# Patient Record
Sex: MALE | Race: WHITE | NOT HISPANIC OR LATINO | Employment: OTHER | URBAN - METROPOLITAN AREA
[De-identification: names, ages, dates, MRNs, and addresses within clinical notes are randomized per-mention and may not be internally consistent; named-entity substitution may affect disease eponyms.]

---

## 2023-04-27 ENCOUNTER — HOSPITAL ENCOUNTER (EMERGENCY)
Facility: HOSPITAL | Age: 58
Discharge: HOME | End: 2023-04-28
Attending: EMERGENCY MEDICINE
Payer: MEDICARE

## 2023-04-27 DIAGNOSIS — E16.0 HYPOGLYCEMIA DUE TO INSULIN: Primary | ICD-10-CM

## 2023-04-27 DIAGNOSIS — T38.3X5A HYPOGLYCEMIA DUE TO INSULIN: Primary | ICD-10-CM

## 2023-04-27 PROCEDURE — 99284 EMERGENCY DEPT VISIT MOD MDM: CPT

## 2023-04-28 VITALS
WEIGHT: 250.22 LBS | HEIGHT: 76 IN | DIASTOLIC BLOOD PRESSURE: 85 MMHG | BODY MASS INDEX: 30.47 KG/M2 | HEART RATE: 78 BPM | OXYGEN SATURATION: 95 % | SYSTOLIC BLOOD PRESSURE: 125 MMHG | TEMPERATURE: 97.9 F | RESPIRATION RATE: 20 BRPM

## 2023-04-28 VITALS
HEIGHT: 76 IN | SYSTOLIC BLOOD PRESSURE: 125 MMHG | DIASTOLIC BLOOD PRESSURE: 85 MMHG | BODY MASS INDEX: 29.47 KG/M2 | RESPIRATION RATE: 19 BRPM | TEMPERATURE: 97.7 F | WEIGHT: 242 LBS | HEART RATE: 74 BPM | OXYGEN SATURATION: 99 %

## 2023-04-28 DIAGNOSIS — E11.649 HYPOGLYCEMIA ASSOCIATED WITH DIABETES: Primary | ICD-10-CM

## 2023-04-28 LAB
ALBUMIN SERPL-MCNC: 4.4 G/DL (ref 3.5–5.2)
ALBUMIN/GLOB SERPL: 1.3 G/DL
ALP SERPL-CCNC: 89 U/L (ref 39–117)
ALT SERPL W P-5'-P-CCNC: 26 U/L (ref 1–41)
ANION GAP SERPL CALCULATED.3IONS-SCNC: 10.3 MMOL/L (ref 5–15)
AST SERPL-CCNC: 17 U/L (ref 1–40)
BASOPHILS # BLD AUTO: 0.05 10*3/MM3 (ref 0–0.2)
BASOPHILS NFR BLD AUTO: 0.5 % (ref 0–1.5)
BILIRUB SERPL-MCNC: 0.4 MG/DL (ref 0–1.2)
BUN SERPL-MCNC: 21 MG/DL (ref 6–20)
BUN/CREAT SERPL: 25.6 (ref 7–25)
CALCIUM SPEC-SCNC: 9 MG/DL (ref 8.6–10.5)
CHLORIDE SERPL-SCNC: 100 MMOL/L (ref 98–107)
CO2 SERPL-SCNC: 27.7 MMOL/L (ref 22–29)
CREAT SERPL-MCNC: 0.82 MG/DL (ref 0.76–1.27)
DEPRECATED RDW RBC AUTO: 41.1 FL (ref 37–54)
EGFRCR SERPLBLD CKD-EPI 2021: 102.5 ML/MIN/1.73
EOSINOPHIL # BLD AUTO: 0.14 10*3/MM3 (ref 0–0.4)
EOSINOPHIL NFR BLD AUTO: 1.4 % (ref 0.3–6.2)
ERYTHROCYTE [DISTWIDTH] IN BLOOD BY AUTOMATED COUNT: 13.2 % (ref 12.3–15.4)
GLOBULIN UR ELPH-MCNC: 3.5 GM/DL
GLUCOSE BLDC GLUCOMTR-MCNC: 108 MG/DL (ref 70–99)
GLUCOSE BLDC GLUCOMTR-MCNC: 132 MG/DL (ref 70–99)
GLUCOSE BLDC GLUCOMTR-MCNC: 148 MG/DL (ref 70–99)
GLUCOSE BLDC GLUCOMTR-MCNC: 58 MG/DL (ref 70–99)
GLUCOSE SERPL-MCNC: 108 MG/DL (ref 65–99)
HCT VFR BLD AUTO: 42.2 % (ref 37.5–51)
HGB BLD-MCNC: 13.9 G/DL (ref 13–17.7)
IMM GRANULOCYTES # BLD AUTO: 0.03 10*3/MM3 (ref 0–0.05)
IMM GRANULOCYTES NFR BLD AUTO: 0.3 % (ref 0–0.5)
LYMPHOCYTES # BLD AUTO: 1.81 10*3/MM3 (ref 0.7–3.1)
LYMPHOCYTES NFR BLD AUTO: 18.6 % (ref 19.6–45.3)
MCH RBC QN AUTO: 28.3 PG (ref 26.6–33)
MCHC RBC AUTO-ENTMCNC: 32.9 G/DL (ref 31.5–35.7)
MCV RBC AUTO: 85.8 FL (ref 79–97)
MONOCYTES # BLD AUTO: 0.59 10*3/MM3 (ref 0.1–0.9)
MONOCYTES NFR BLD AUTO: 6.1 % (ref 5–12)
NEUTROPHILS NFR BLD AUTO: 7.1 10*3/MM3 (ref 1.7–7)
NEUTROPHILS NFR BLD AUTO: 73.1 % (ref 42.7–76)
NRBC BLD AUTO-RTO: 0 /100 WBC (ref 0–0.2)
PLATELET # BLD AUTO: 212 10*3/MM3 (ref 140–450)
PMV BLD AUTO: 10.5 FL (ref 6–12)
POTASSIUM SERPL-SCNC: 4 MMOL/L (ref 3.5–5.2)
PROT SERPL-MCNC: 7.9 G/DL (ref 6–8.5)
RBC # BLD AUTO: 4.92 10*6/MM3 (ref 4.14–5.8)
SODIUM SERPL-SCNC: 138 MMOL/L (ref 136–145)
WBC NRBC COR # BLD: 9.72 10*3/MM3 (ref 3.4–10.8)

## 2023-04-28 PROCEDURE — 82948 REAGENT STRIP/BLOOD GLUCOSE: CPT

## 2023-04-28 PROCEDURE — 36415 COLL VENOUS BLD VENIPUNCTURE: CPT

## 2023-04-28 PROCEDURE — 80053 COMPREHEN METABOLIC PANEL: CPT | Performed by: EMERGENCY MEDICINE

## 2023-04-28 PROCEDURE — 96374 THER/PROPH/DIAG INJ IV PUSH: CPT

## 2023-04-28 PROCEDURE — 99284 EMERGENCY DEPT VISIT MOD MDM: CPT

## 2023-04-28 PROCEDURE — 85025 COMPLETE CBC W/AUTO DIFF WBC: CPT | Performed by: EMERGENCY MEDICINE

## 2023-04-28 RX ORDER — INSULIN HUMAN 500 [IU]/ML
INJECTION, SOLUTION SUBCUTANEOUS
COMMUNITY
Start: 2023-01-13

## 2023-04-28 RX ORDER — CETIRIZINE HYDROCHLORIDE 10 MG/1
TABLET ORAL
COMMUNITY

## 2023-04-28 RX ORDER — LEVOTHYROXINE SODIUM 0.03 MG/1
25 TABLET ORAL DAILY
COMMUNITY

## 2023-04-28 RX ORDER — DAPAGLIFLOZIN AND METFORMIN HYDROCHLORIDE 5; 1000 MG/1; MG/1
TABLET, FILM COATED, EXTENDED RELEASE ORAL EVERY 24 HOURS
COMMUNITY

## 2023-04-28 RX ORDER — CARVEDILOL 25 MG/1
TABLET ORAL
COMMUNITY
Start: 2023-04-04

## 2023-04-28 RX ORDER — DAPAGLIFLOZIN AND METFORMIN HYDROCHLORIDE 5; 1000 MG/1; MG/1
TABLET, FILM COATED, EXTENDED RELEASE ORAL
COMMUNITY
Start: 2023-04-04

## 2023-04-28 RX ORDER — DEXTROSE MONOHYDRATE 25 G/50ML
25 INJECTION, SOLUTION INTRAVENOUS ONCE
Status: COMPLETED | OUTPATIENT
Start: 2023-04-28 | End: 2023-04-28

## 2023-04-28 RX ORDER — INSULIN HUMAN 500 [IU]/ML
INJECTION, SOLUTION SUBCUTANEOUS
COMMUNITY

## 2023-04-28 RX ORDER — CHLORAL HYDRATE 500 MG
CAPSULE ORAL EVERY 12 HOURS SCHEDULED
COMMUNITY

## 2023-04-28 RX ORDER — ASPIRIN 81 MG/1
TABLET ORAL EVERY 24 HOURS
COMMUNITY

## 2023-04-28 RX ORDER — MELATONIN: COMMUNITY

## 2023-04-28 RX ADMIN — DEXTROSE MONOHYDRATE 25 G: 25 INJECTION, SOLUTION INTRAVENOUS at 01:37

## 2023-04-28 NOTE — ED PROVIDER NOTES
Time: 7:01 PM EDT  Date of encounter:  4/28/2023  Independent Historian/Clinical History and Information was obtained by:   Patient, Family and EMS  Chief Complaint: Hypoglycemia    History is limited by: N/A    History of Present Illness:  Patient is a 58 y.o. year old male who presents to the emergency department for evaluation of hypoglycemia. Pt is on insulin. EMS reports that family called due to Pt passing out. Pt states he is mostly checking his blood sugar when he is taking his insulin. EMS states they gave patient oral glucose. Pt states he hasn't eaten since this morning. Family states Pt was found yesterday in his vehicle not responding.     HPI    Patient Care Team  Primary Care Provider: Provider, No Known    Past Medical History:     Allergies   Allergen Reactions   • Penicillins Itching     History reviewed. No pertinent past medical history.  History reviewed. No pertinent surgical history.  History reviewed. No pertinent family history.    Home Medications:  Prior to Admission medications    Medication Sig Start Date End Date Taking? Authorizing Provider   aspirin 81 MG EC tablet Daily.    Keli Hernandez MD   carvedilol (COREG) 25 MG tablet  4/4/23   Keli Hernandez MD   cetirizine (zyrTEC) 10 MG tablet cetirizine 10 mg tablet    Keli Hernandez MD   Cholecalciferol 25 MCG (1000 UT) tablet cholecalciferol (vitamin D3) 25 mcg (1,000 unit) tablet    Keli Hernandez MD   dapagliflozin-metformin HCl ER (Xigduo XR) 5-1000 MG tablet Daily.    Keli Hernandez MD   HUMULIN R 500 UNIT/ML CONCENTRATED injection INJECT 380 UNITS A DAY VIA PUMP  UNITS THREE TIMES DAILY 1/13/23   Klei Hernandez MD   insulin regular (HUMULIN R) 500 UNIT/ML CONCENTRATED injection Humulin R U-500 (Concentrated) Insulin 500 unit/mL subcutaneous soln   INJECT 380 UNITS A DAY VIA PUMP  UNITS THREE TIMES DAILY    Keli Hernandez MD   levothyroxine (SYNTHROID, LEVOTHROID) 25 MCG  "tablet Take 1 tablet by mouth Daily.    Provider, MD Keli   Omega-3 Fatty Acids (fish oil) 1000 MG capsule capsule Every 12 (Twelve) Hours.    ProviderKeli MD   Xigduo XR 5-1000 MG tablet  4/4/23   ProviderKeli MD        Social History:   Social History     Tobacco Use   • Smoking status: Every Day     Packs/day: 1.00     Years: 15.00     Pack years: 15.00     Types: Cigarettes   • Smokeless tobacco: Never   Vaping Use   • Vaping Use: Never used   Substance Use Topics   • Alcohol use: Not Currently   • Drug use: Not Currently         Review of Systems:  Review of Systems   Constitutional: Negative for chills and fever.   HENT: Negative for congestion, rhinorrhea and sore throat.    Eyes: Negative for pain and visual disturbance.   Respiratory: Negative for apnea, cough, chest tightness and shortness of breath.    Cardiovascular: Negative for chest pain and palpitations.   Gastrointestinal: Negative for abdominal pain, diarrhea, nausea and vomiting.   Genitourinary: Negative for difficulty urinating and dysuria.   Musculoskeletal: Negative for joint swelling and myalgias.   Skin: Negative for color change.   Neurological: Positive for syncope. Negative for seizures and headaches.   Psychiatric/Behavioral: Negative.    All other systems reviewed and are negative.       Physical Exam:  /87   Pulse 84   Temp 97.9 °F (36.6 °C) (Oral)   Resp 20   Ht 193 cm (75.98\")   Wt 113 kg (250 lb 3.6 oz)   SpO2 94%   BMI 30.47 kg/m²     Physical Exam  Constitutional:       Appearance: Normal appearance.   HENT:      Head: Normocephalic and atraumatic.      Nose: Nose normal.      Mouth/Throat:      Mouth: Mucous membranes are moist.   Eyes:      Extraocular Movements: Extraocular movements intact.      Conjunctiva/sclera: Conjunctivae normal.      Pupils: Pupils are equal, round, and reactive to light.   Cardiovascular:      Rate and Rhythm: Normal rate and regular rhythm.      Pulses: Normal " pulses.      Heart sounds: Normal heart sounds.   Pulmonary:      Effort: Pulmonary effort is normal.      Breath sounds: Normal breath sounds. No wheezing.   Abdominal:      Palpations: Abdomen is soft.      Tenderness: There is no abdominal tenderness.   Musculoskeletal:         General: Normal range of motion.      Cervical back: Normal range of motion and neck supple.      Right lower leg: No edema.      Left lower leg: No edema.   Skin:     General: Skin is warm and dry.      Capillary Refill: Capillary refill takes less than 2 seconds.      Findings: No rash.   Neurological:      General: No focal deficit present.      Mental Status: He is alert and oriented to person, place, and time. Mental status is at baseline.      Cranial Nerves: No cranial nerve deficit.      Sensory: No sensory deficit.      Motor: No weakness.   Psychiatric:         Mood and Affect: Mood normal.         Behavior: Behavior normal.                  Procedures:  Procedures      Medical Decision Making:      Comorbidities that affect care:    Diabetes    External Notes reviewed:    Previous ED Note: Evaluation for hyperglycemia      The following orders were placed and all results were independently analyzed by me:  Orders Placed This Encounter   Procedures   • POC Glucose STAT   • POC Glucose Once       Medications Given in the Emergency Department:  Medications - No data to display     ED Course:         Labs:    Lab Results (last 24 hours)     Procedure Component Value Units Date/Time    CBC & Differential [[id number]]  (Abnormal) Collected: 04/28/23 0009    Specimen: Blood Updated: 04/28/23 0013    Narrative:      The following orders were created for panel order CBC & Differential.  Procedure                               Abnormality         Status                     ---------                               -----------         ------                     CBC Auto Differential[[id number]]        Abnormal            Final result                  Please view results for these tests on the individual orders.    Comprehensive Metabolic Panel [[id number]]  (Abnormal) Collected: 04/28/23 0009    Specimen: Blood Updated: 04/28/23 0028     Glucose 108 mg/dL      BUN 21 mg/dL      Creatinine 0.82 mg/dL      Sodium 138 mmol/L      Potassium 4.0 mmol/L      Chloride 100 mmol/L      CO2 27.7 mmol/L      Calcium 9.0 mg/dL      Total Protein 7.9 g/dL      Albumin 4.4 g/dL      ALT (SGPT) 26 U/L      AST (SGOT) 17 U/L      Alkaline Phosphatase 89 U/L      Total Bilirubin 0.4 mg/dL      Globulin 3.5 gm/dL      A/G Ratio 1.3 g/dL      BUN/Creatinine Ratio 25.6     Anion Gap 10.3 mmol/L      eGFR 102.5 mL/min/1.73     Narrative:      GFR Normal >60  Chronic Kidney Disease <60  Kidney Failure <15      CBC Auto Differential [[id number]]  (Abnormal) Collected: 04/28/23 0009    Specimen: Blood Updated: 04/28/23 0013     WBC 9.72 10*3/mm3      RBC 4.92 10*6/mm3      Hemoglobin 13.9 g/dL      Hematocrit 42.2 %      MCV 85.8 fL      MCH 28.3 pg      MCHC 32.9 g/dL      RDW 13.2 %      RDW-SD 41.1 fl      MPV 10.5 fL      Platelets 212 10*3/mm3      Neutrophil % 73.1 %      Lymphocyte % 18.6 %      Monocyte % 6.1 %      Eosinophil % 1.4 %      Basophil % 0.5 %      Immature Grans % 0.3 %      Neutrophils, Absolute 7.10 10*3/mm3      Lymphocytes, Absolute 1.81 10*3/mm3      Monocytes, Absolute 0.59 10*3/mm3      Eosinophils, Absolute 0.14 10*3/mm3      Basophils, Absolute 0.05 10*3/mm3      Immature Grans, Absolute 0.03 10*3/mm3      nRBC 0.0 /100 WBC     POC Glucose Once [[id number]]  (Abnormal) Collected: 04/28/23 0010    Specimen: Blood Updated: 04/28/23 0012     Glucose 108 mg/dL      Comment: Serial Number: [id number]:  [id number]       POC Glucose Once [[id number]]  (Abnormal) Collected: 04/28/23 0127    Specimen: Blood Updated: 04/28/23 0129     Glucose 58 mg/dL      Comment: Serial Number: [id number]:  [id number]       POC Glucose Once [[id number]]  (Abnormal)  Collected: 04/28/23 0221    Specimen: Blood Updated: 04/28/23 0223     Glucose 148 mg/dL      Comment: Serial Number: [id number]:  [id number]       POC Glucose Once [[id number]]  (Abnormal) Collected: 04/28/23 1931    Specimen: Blood Updated: 04/28/23 1934     Glucose 132 mg/dL      Comment: Serial Number: [id number]:  [id number]              Imaging:    No Radiology Exams Resulted Within Past 24 Hours      Differential Diagnosis and Discussion:    Metabolic: Differential diagnosis includes but is not limited to hypertension, hyperglycemia, hyperkalemia, hypocalcemia, metabolic acidosis, hypokalemia, hypoglycemia, malnutrition, hypothyroidism, hyperthyroidism, and adrenal insufficiency.     All labs were reviewed and interpreted by me.    MDM  Number of Diagnoses or Management Options  Hypoglycemia associated with diabetes  Diagnosis management comments: In summary this is a 58-year-old male patient, diabetic who presents to the emergency department for evaluation of another hypoglycemic episode.  On further investigation patient is not eating and adhering to a diabetic diet.  Last thing he had was waffles and hashbrowns from [employer] followed by large insulin bolus.  At this time he is otherwise awake and well-appearing and at his baseline.  Is been given strict instructions for diabetic diet and his blood sugar has maintained in the emergency department after eating food.  Very strict return to ER and follow-up instructions have been provided to the patient.             Patient Care Considerations:    LABS: I considered ordering labs, however Patient recently had laboratory evaluation      Consultants/Shared Management Plan:    None    Social Determinants of Health:    Patient is independent, reliable, and has access to care.       Disposition and Care Coordination:    Discharged: I considered escalation of care by admitting this patient for observation, however the patient has improved and is  suitable and  stable for discharge.    I have explained the patient´s condition, diagnoses and treatment plan based on the information available to me at this time. I have answered questions and addressed any concerns. The patient has a good  understanding of the patient´s diagnosis, condition, and treatment plan as can be expected at this point. The vital signs have been stable. The patient´s condition is stable and appropriate for discharge from the emergency department.      The patient will pursue further outpatient evaluation with the primary care physician or other designated or consulting physician as outlined in the discharge instructions. They are agreeable to this plan of care and follow-up instructions have been explained in detail. The patient has received these instructions in written format and have expressed an understanding of the discharge instructions. The patient is aware that any significant change in condition or worsening of symptoms should prompt an immediate return to this or the closest emergency department or call to 911.  I have explained discharge medications and the need for follow up with the patient/caretakers. This was also printed in the discharge instructions. Patient was discharged with the following medications and follow up:      Medication List      No changes were made to your prescriptions during this visit.      Provider, No Known  [facility] [id number]    In 2 days         Final diagnoses:   Hypoglycemia associated with diabetes        ED Disposition     ED Disposition   Discharge    Condition   Stable    Comment   --             This medical record created using voice recognition software.    Documentation assistance provided by Mack Cruz, acting as scribe for Jeramy Nielson MD. Information recorded by the scribe was done at my direction and has been verified and validated by me.     Mack Cruz  04/28/23 1918       Jeramy Nielson MD  04/28/23  1946

## 2023-04-28 NOTE — ED PROVIDER NOTES
"Time: 12:57 AM EDT  Date of encounter:  4/27/2023  Independent Historian/Clinical History and Information was obtained by:   Patient  Chief Complaint: Hypoglycemia    History is limited by: N/A    History of Present Illness:      Patient is a 58 y.o. year old male who presents to the emergency department for evaluation of hypoglycemia. Pt states that he went to the doctor and was given more insulin despite not eating and reports being found in his car unconscious. Pt also notes that he does not remember even being at the store and admits to hypoglycemia in the past. Pt notes that he does take insulin at home for diabetes. Pt reports that he does monitor his blood glucose regularly at home.       History provided by:  Patient   used: No        Patient Care Team  Primary Care Provider: Provider, No Known    Past Medical History:     Allergies   Allergen Reactions   • Penicillins Itching     No past medical history on file.  No past surgical history on file.  No family history on file.    Home Medications:  Prior to Admission medications    Not on File        Social History:          Review of Systems:  Review of Systems   Constitutional: Negative for chills and fever.   HENT: Negative for congestion, rhinorrhea and sore throat.    Eyes: Negative for pain and visual disturbance.   Respiratory: Negative for apnea, cough, chest tightness and shortness of breath.    Cardiovascular: Negative for chest pain and palpitations.   Gastrointestinal: Negative for abdominal pain, diarrhea, nausea and vomiting.   Genitourinary: Negative for difficulty urinating and dysuria.   Musculoskeletal: Negative for joint swelling and myalgias.   Skin: Negative for color change.   Neurological: Negative for seizures and headaches.   Psychiatric/Behavioral: Negative.    All other systems reviewed and are negative.       Physical Exam:  /85   Pulse 74   Temp 97.7 °F (36.5 °C) (Oral)   Resp 19   Ht 193 cm (76\")   " Wt 110 kg (242 lb)   SpO2 99%   BMI 29.46 kg/m²     Physical Exam  Vitals and nursing note reviewed.   Constitutional:       General: He is not in acute distress.     Appearance: Normal appearance. He is not toxic-appearing.   HENT:      Head: Normocephalic and atraumatic.      Jaw: There is normal jaw occlusion.   Eyes:      General: Lids are normal.      Extraocular Movements: Extraocular movements intact.      Conjunctiva/sclera: Conjunctivae normal.      Pupils: Pupils are equal, round, and reactive to light.   Cardiovascular:      Rate and Rhythm: Normal rate and regular rhythm.      Pulses: Normal pulses.      Heart sounds: Normal heart sounds.   Pulmonary:      Effort: Pulmonary effort is normal. No respiratory distress.      Breath sounds: Normal breath sounds. No wheezing or rhonchi.   Abdominal:      General: Abdomen is flat.      Palpations: Abdomen is soft.      Tenderness: There is no abdominal tenderness. There is no guarding or rebound.   Musculoskeletal:         General: Normal range of motion.      Cervical back: Normal range of motion and neck supple.      Right lower leg: No edema.      Left lower leg: No edema.   Skin:     General: Skin is warm and dry.   Neurological:      Mental Status: He is alert and oriented to person, place, and time. Mental status is at baseline.   Psychiatric:         Mood and Affect: Mood normal.                  Procedures:  Procedures      Medical Decision Making:      Comorbidities that affect care:    Diabetes    External Notes reviewed:    None      The following orders were placed and all results were independently analyzed by me:  Orders Placed This Encounter   Procedures   • Comprehensive Metabolic Panel   • CBC Auto Differential   • Po fluids  Misc Nursing Order (Specify)   • POC Glucose Once   • POC Glucose STAT   • POC Glucose Once   • POC Glucose STAT   • POC Glucose Once   • CBC & Differential       Medications Given in the Emergency  Department:  Medications   dextrose (D50W) (25 g/50 mL) IV injection 25 g (25 g Intravenous Given 4/28/23 0137)        ED Course:         Labs:    Lab Results (last 24 hours)     Procedure Component Value Units Date/Time    CBC & Differential [[id number]]  (Abnormal) Collected: 04/28/23 0009    Specimen: Blood Updated: 04/28/23 0013    Narrative:      The following orders were created for panel order CBC & Differential.  Procedure                               Abnormality         Status                     ---------                               -----------         ------                     CBC Auto Differential[[id number]]        Abnormal            Final result                 Please view results for these tests on the individual orders.    Comprehensive Metabolic Panel [[id number]]  (Abnormal) Collected: 04/28/23 0009    Specimen: Blood Updated: 04/28/23 0028     Glucose 108 mg/dL      BUN 21 mg/dL      Creatinine 0.82 mg/dL      Sodium 138 mmol/L      Potassium 4.0 mmol/L      Chloride 100 mmol/L      CO2 27.7 mmol/L      Calcium 9.0 mg/dL      Total Protein 7.9 g/dL      Albumin 4.4 g/dL      ALT (SGPT) 26 U/L      AST (SGOT) 17 U/L      Alkaline Phosphatase 89 U/L      Total Bilirubin 0.4 mg/dL      Globulin 3.5 gm/dL      A/G Ratio 1.3 g/dL      BUN/Creatinine Ratio 25.6     Anion Gap 10.3 mmol/L      eGFR 102.5 mL/min/1.73     Narrative:      GFR Normal >60  Chronic Kidney Disease <60  Kidney Failure <15      CBC Auto Differential [[id number]]  (Abnormal) Collected: 04/28/23 0009    Specimen: Blood Updated: 04/28/23 0013     WBC 9.72 10*3/mm3      RBC 4.92 10*6/mm3      Hemoglobin 13.9 g/dL      Hematocrit 42.2 %      MCV 85.8 fL      MCH 28.3 pg      MCHC 32.9 g/dL      RDW 13.2 %      RDW-SD 41.1 fl      MPV 10.5 fL      Platelets 212 10*3/mm3      Neutrophil % 73.1 %      Lymphocyte % 18.6 %      Monocyte % 6.1 %      Eosinophil % 1.4 %      Basophil % 0.5 %      Immature Grans % 0.3 %      Neutrophils,  Absolute 7.10 10*3/mm3      Lymphocytes, Absolute 1.81 10*3/mm3      Monocytes, Absolute 0.59 10*3/mm3      Eosinophils, Absolute 0.14 10*3/mm3      Basophils, Absolute 0.05 10*3/mm3      Immature Grans, Absolute 0.03 10*3/mm3      nRBC 0.0 /100 WBC     POC Glucose Once [[id number]]  (Abnormal) Collected: 04/28/23 0010    Specimen: Blood Updated: 04/28/23 0012     Glucose 108 mg/dL      Comment: Serial Number: [id number]:  [id number]       POC Glucose Once [[id number]]  (Abnormal) Collected: 04/28/23 0127    Specimen: Blood Updated: 04/28/23 0129     Glucose 58 mg/dL      Comment: Serial Number: [id number]:  [id number]       POC Glucose Once [[id number]]  (Abnormal) Collected: 04/28/23 0221    Specimen: Blood Updated: 04/28/23 0223     Glucose 148 mg/dL      Comment: Serial Number: [id number]:  [id number]              Imaging:    No Radiology Exams Resulted Within Past 24 Hours      Differential Diagnosis and Discussion:    Hypoglycemia    All labs were reviewed and interpreted by me.    MDM         Patient Care Considerations:    ANTIBIOTICS: I considered prescribing antibiotics as an outpatient however No bacterial etiology found in the emergency department.      Consultants/Shared Management Plan:    None    Social Determinants of Health:    Patient has presented with family members who are responsible, reliable and will ensure follow up care.      Disposition and Care Coordination:    Discharged: The patient is suitable and stable for discharge with no need for consideration of observation or admission.    I have explained the patient´s condition, diagnoses and treatment plan based on the information available to me at this time. I have answered questions and addressed any concerns. The patient has a good  understanding of the patient´s diagnosis, condition, and treatment plan as can be expected at this point. The vital signs have been stable. The patient´s condition is stable and appropriate for  discharge from the emergency department.      The patient will pursue further outpatient evaluation with the primary care physician or other designated or consulting physician as outlined in the discharge instructions. They are agreeable to this plan of care and follow-up instructions have been explained in detail. The patient has received these instructions in written format and have expressed an understanding of the discharge instructions. The patient is aware that any significant change in condition or worsening of symptoms should prompt an immediate return to this or the closest emergency department or call to 911.  I have explained discharge medications and the need for follow up with the patient/caretakers. This was also printed in the discharge instructions. Patient was discharged with the following medications and follow up:      Medication List      No changes were made to your prescriptions during this visit.      [facility], No Known  [facility] [place] [address]    Schedule an appointment as soon as possible for a visit          Final diagnoses:   Hypoglycemia due to insulin        ED Disposition     ED Disposition   Discharge    Condition   Stable    Comment   --             This medical record created using voice recognition software.        Documentation assistance provided by Vladislav Martins acting as scribe for Fantasma Yip MD. Information recorded by the scribe was done at my direction and has been verified and validated by me.        Vladislav Martins  04/28/23 0120       Fantasma Yip MD  04/28/23 [id number]

## 2023-05-02 LAB — GLUCOSE BLDC GLUCOMTR-MCNC: 83 MG/DL (ref 70–99)

## 2023-05-06 ENCOUNTER — HOSPITAL ENCOUNTER (EMERGENCY)
Facility: HOSPITAL | Age: 58
Discharge: HOME | End: 2023-05-06
Attending: EMERGENCY MEDICINE
Payer: MEDICARE

## 2023-05-06 VITALS
BODY MASS INDEX: 28.7 KG/M2 | WEIGHT: 235.67 LBS | HEART RATE: 70 BPM | OXYGEN SATURATION: 99 % | DIASTOLIC BLOOD PRESSURE: 88 MMHG | SYSTOLIC BLOOD PRESSURE: 111 MMHG | RESPIRATION RATE: 18 BRPM | HEIGHT: 76 IN | TEMPERATURE: 97.5 F

## 2023-05-06 DIAGNOSIS — E11.649 HYPOGLYCEMIA ASSOCIATED WITH DIABETES: Primary | ICD-10-CM

## 2023-05-06 LAB
ALBUMIN SERPL-MCNC: 4.4 G/DL (ref 3.5–5.2)
ALBUMIN/GLOB SERPL: 1.2 G/DL
ALP SERPL-CCNC: 74 U/L (ref 39–117)
ALT SERPL W P-5'-P-CCNC: 15 U/L (ref 1–41)
ANION GAP SERPL CALCULATED.3IONS-SCNC: 12.8 MMOL/L (ref 5–15)
AST SERPL-CCNC: 15 U/L (ref 1–40)
BASOPHILS # BLD AUTO: 0.03 10*3/MM3 (ref 0–0.2)
BASOPHILS NFR BLD AUTO: 0.3 % (ref 0–1.5)
BILIRUB SERPL-MCNC: 0.5 MG/DL (ref 0–1.2)
BUN SERPL-MCNC: 21 MG/DL (ref 6–20)
BUN/CREAT SERPL: 28 (ref 7–25)
CALCIUM SPEC-SCNC: 9.3 MG/DL (ref 8.6–10.5)
CHLORIDE SERPL-SCNC: 99 MMOL/L (ref 98–107)
CO2 SERPL-SCNC: 27.2 MMOL/L (ref 22–29)
CREAT SERPL-MCNC: 0.75 MG/DL (ref 0.76–1.27)
DEPRECATED RDW RBC AUTO: 38.4 FL (ref 37–54)
EGFRCR SERPLBLD CKD-EPI 2021: 105.3 ML/MIN/1.73
EOSINOPHIL # BLD AUTO: 0.02 10*3/MM3 (ref 0–0.4)
EOSINOPHIL NFR BLD AUTO: 0.2 % (ref 0.3–6.2)
ERYTHROCYTE [DISTWIDTH] IN BLOOD BY AUTOMATED COUNT: 12.8 % (ref 12.3–15.4)
GLOBULIN UR ELPH-MCNC: 3.8 GM/DL
GLUCOSE BLDC GLUCOMTR-MCNC: 139 MG/DL (ref 70–99)
GLUCOSE BLDC GLUCOMTR-MCNC: 150 MG/DL (ref 70–99)
GLUCOSE BLDC GLUCOMTR-MCNC: 54 MG/DL (ref 70–99)
GLUCOSE BLDC GLUCOMTR-MCNC: 97 MG/DL (ref 70–99)
GLUCOSE SERPL-MCNC: 67 MG/DL (ref 65–99)
HCT VFR BLD AUTO: 44.2 % (ref 37.5–51)
HGB BLD-MCNC: 15.1 G/DL (ref 13–17.7)
HOLD SPECIMEN: NORMAL
HOLD SPECIMEN: NORMAL
IMM GRANULOCYTES # BLD AUTO: 0.03 10*3/MM3 (ref 0–0.05)
IMM GRANULOCYTES NFR BLD AUTO: 0.3 % (ref 0–0.5)
LYMPHOCYTES # BLD AUTO: 1.17 10*3/MM3 (ref 0.7–3.1)
LYMPHOCYTES NFR BLD AUTO: 12.6 % (ref 19.6–45.3)
MCH RBC QN AUTO: 28.3 PG (ref 26.6–33)
MCHC RBC AUTO-ENTMCNC: 34.2 G/DL (ref 31.5–35.7)
MCV RBC AUTO: 82.8 FL (ref 79–97)
MONOCYTES # BLD AUTO: 0.27 10*3/MM3 (ref 0.1–0.9)
MONOCYTES NFR BLD AUTO: 2.9 % (ref 5–12)
NEUTROPHILS NFR BLD AUTO: 7.73 10*3/MM3 (ref 1.7–7)
NEUTROPHILS NFR BLD AUTO: 83.7 % (ref 42.7–76)
NRBC BLD AUTO-RTO: 0 /100 WBC (ref 0–0.2)
PLATELET # BLD AUTO: 222 10*3/MM3 (ref 140–450)
PMV BLD AUTO: 10.8 FL (ref 6–12)
POTASSIUM SERPL-SCNC: 3.8 MMOL/L (ref 3.5–5.2)
PROT SERPL-MCNC: 8.2 G/DL (ref 6–8.5)
RBC # BLD AUTO: 5.34 10*6/MM3 (ref 4.14–5.8)
SODIUM SERPL-SCNC: 139 MMOL/L (ref 136–145)
WBC NRBC COR # BLD: 9.25 10*3/MM3 (ref 3.4–10.8)
WHOLE BLOOD HOLD COAG: NORMAL
WHOLE BLOOD HOLD SPECIMEN: NORMAL

## 2023-05-06 PROCEDURE — 85025 COMPLETE CBC W/AUTO DIFF WBC: CPT | Performed by: EMERGENCY MEDICINE

## 2023-05-06 PROCEDURE — 82948 REAGENT STRIP/BLOOD GLUCOSE: CPT

## 2023-05-06 PROCEDURE — 99283 EMERGENCY DEPT VISIT LOW MDM: CPT

## 2023-05-06 PROCEDURE — 80053 COMPREHEN METABOLIC PANEL: CPT | Performed by: EMERGENCY MEDICINE

## 2023-05-06 PROCEDURE — 96374 THER/PROPH/DIAG INJ IV PUSH: CPT

## 2023-05-06 PROCEDURE — 36415 COLL VENOUS BLD VENIPUNCTURE: CPT | Performed by: EMERGENCY MEDICINE

## 2023-05-06 RX ORDER — DEXTROSE MONOHYDRATE 25 G/50ML
25 INJECTION, SOLUTION INTRAVENOUS ONCE
Status: COMPLETED | OUTPATIENT
Start: 2023-05-06 | End: 2023-05-06

## 2023-05-06 RX ORDER — SODIUM CHLORIDE 0.9 % (FLUSH) 0.9 %
10 SYRINGE (ML) INJECTION AS NEEDED
Status: DISCONTINUED | OUTPATIENT
Start: 2023-05-06 | End: 2023-05-06 | Stop reason: HOSPADM

## 2023-05-06 RX ORDER — DEXTROSE MONOHYDRATE 25 G/50ML
INJECTION, SOLUTION INTRAVENOUS
Status: COMPLETED
Start: 2023-05-06 | End: 2023-05-06

## 2023-05-06 RX ADMIN — DEXTROSE MONOHYDRATE 25 G: 25 INJECTION, SOLUTION INTRAVENOUS at 16:15

## 2023-05-06 NOTE — DISCHARGE INSTRUCTIONS
Patient's blood glucose 150 in the emergency department.  Electrolytes are within normal limits and patient is not dehydrated according to lab work.  Keep your appointment with diabetic doctor for Wednesday.  Encourage plenty of fluids.  Encourage strict compliance with diabetic diet and checking your blood sugar regularly.  Return to the emergency department if patient becomes altered, loses consciousness, becomes weak or lethargic.

## 2023-05-06 NOTE — ED PROVIDER NOTES
Time: 3:49 PM EDT  Date of encounter:  5/6/2023  Independent Historian/Clinical History and Information was obtained by:   Patient and Family  Chief Complaint   Patient presents with   • Hypoglycemia       History is limited by: N/A    History of Present Illness:  Patient is a 58 y.o. year old male who presents to the emergency department for evaluation of hypoglycemia.  Patient's family states that the patient seemed altered earlier and when his blood sugar was checked it was 39.  Patient was given D10 by EMS services according to nursing staff.  Patient states he feels back to his baseline at this time.  Note patient has been to the emergency department multiple times over the past few weeks for the same complaint.  Patient states he is continuing to take insulin but his appetite has been decreased.  Patient ate turkey sandwich and a bag of chips prior to my evaluation.  (Provider in triage, Mark Laguna PA-C)    Family at bedside reports that patient is not complying with his diabetic diet and checking his blood sugar regularly.  Family reports whenever he is at home and under their guidance he does not have any issues with his blood glucose however whenever he is over his girlfriend's place he seems to not adhere to his diabetic diet and checking his blood sugar.  At this time patient denies any nausea, vomiting, abdominal pain, dizziness, lightheadedness, weakness.  Family denies syncopal episode during today's episode but states that he had syncopal episodes the 2 previous times he was seen in emergency department for the same complaint.  Patient denies fever, chills, shortness of breath, chest pain, cough.  Patient denies dysuria, hematuria, urinary frequency.    HPI    Patient Care Team  Primary Care Provider: Provider, No Known    Past Medical History:     Allergies   Allergen Reactions   • Penicillins Itching     Past Medical History:   Diagnosis Date   • Diabetes mellitus      History reviewed. No  pertinent surgical history.  History reviewed. No pertinent family history.    Home Medications:  Prior to Admission medications    Medication Sig Start Date End Date Taking? Authorizing Provider   aspirin 81 MG EC tablet Daily.    Keli Hernandez MD   carvedilol (COREG) 25 MG tablet  4/4/23   Keli Hernandez MD   cetirizine (zyrTEC) 10 MG tablet cetirizine 10 mg tablet    Keli Hernandez MD   Cholecalciferol 25 MCG (1000 UT) tablet cholecalciferol (vitamin D3) 25 mcg (1,000 unit) tablet    Keli Hernandez MD   dapagliflozin-metformin HCl ER (Xigduo XR) 5-1000 MG tablet Daily.    Keli Hernandez MD   HUMULIN R 500 UNIT/ML CONCENTRATED injection INJECT 380 UNITS A DAY VIA PUMP  UNITS THREE TIMES DAILY 1/13/23   Keli Hernandez MD   insulin regular (HUMULIN R) 500 UNIT/ML CONCENTRATED injection Humulin R U-500 (Concentrated) Insulin 500 unit/mL subcutaneous soln   INJECT 380 UNITS A DAY VIA PUMP  UNITS THREE TIMES DAILY    Keli Hernandez MD   levothyroxine (SYNTHROID, LEVOTHROID) 25 MCG tablet Take 1 tablet by mouth Daily.    Keli Hernandez MD   Omega-3 Fatty Acids (fish oil) 1000 MG capsule capsule Every 12 (Twelve) Hours.    Keli Hernandez MD   Xigduo XR 5-1000 MG tablet  4/4/23   Keli Hernandez MD        Social History:   Social History     Tobacco Use   • Smoking status: Every Day     Packs/day: 1.00     Years: 15.00     Pack years: 15.00     Types: Cigarettes   • Smokeless tobacco: Never   Vaping Use   • Vaping Use: Never used   Substance Use Topics   • Alcohol use: Not Currently   • Drug use: Not Currently         Review of Systems:  Review of Systems   Constitutional: Negative for chills and fever.   Respiratory: Negative for cough and shortness of breath.    Cardiovascular: Negative for chest pain.   Gastrointestinal: Negative for abdominal pain, diarrhea, nausea and vomiting.   Genitourinary: Negative for dysuria, frequency and  "hematuria.   Musculoskeletal: Negative for gait problem.   Neurological: Negative for dizziness, syncope, weakness and light-headedness.   Psychiatric/Behavioral: Negative for confusion.        Physical Exam:  /88   Pulse 70   Temp 97.5 °F (36.4 °C) (Oral)   Resp 18   Ht 193 cm (76\")   Wt 107 kg (235 lb 10.8 oz)   SpO2 99%   BMI 28.69 kg/m²     Physical Exam  Vitals and nursing note reviewed.   Constitutional:       General: He is awake.      Appearance: Normal appearance. He is not ill-appearing or toxic-appearing.   HENT:      Head: Normocephalic and atraumatic.      Nose: Nose normal.      Mouth/Throat:      Mouth: Mucous membranes are moist.   Eyes:      Extraocular Movements: Extraocular movements intact.      Conjunctiva/sclera: Conjunctivae normal.      Pupils: Pupils are equal, round, and reactive to light.   Cardiovascular:      Rate and Rhythm: Normal rate and regular rhythm.      Pulses: Normal pulses.           Dorsalis pedis pulses are 2+ on the right side and 2+ on the left side.      Heart sounds: Normal heart sounds.   Pulmonary:      Effort: Pulmonary effort is normal. No respiratory distress.      Breath sounds: Normal breath sounds. No wheezing.   Chest:      Chest wall: No tenderness.   Abdominal:      General: Abdomen is flat. Bowel sounds are normal. There is no distension.      Palpations: Abdomen is soft.      Tenderness: There is no abdominal tenderness. There is no right CVA tenderness, left CVA tenderness or guarding.   Musculoskeletal:         General: Normal range of motion.      Cervical back: Normal range of motion and neck supple.   Skin:     General: Skin is warm and dry.      Coloration: Skin is not cyanotic.   Neurological:      General: No focal deficit present.      Mental Status: He is alert and oriented to person, place, and time.      Cranial Nerves: Cranial nerves 2-12 are intact. No cranial nerve deficit, dysarthria or facial asymmetry.      Motor: No weakness. "      Coordination: Finger-Nose-Finger Test normal.   Psychiatric:         Attention and Perception: Attention and perception normal.         Mood and Affect: Mood normal.         Speech: Speech normal.         Behavior: Behavior normal. Behavior is cooperative.         Thought Content: Thought content normal.                  Procedures:  Procedures      Medical Decision Making:      Comorbidities that affect care:    Diabetes    External Notes reviewed:    Previous ED Note: Patient was seen at [facility] emergency department on 4/28/2023 for hypoglycemia      The following orders were placed and all results were independently analyzed by me:  Orders Placed This Encounter   Procedures   • Shiner Draw   • Comprehensive Metabolic Panel   • CBC Auto Differential   • POC Glucose Once   • POC Glucose Once   • POC Glucose Once   • Insert peripheral IV   • CBC & Differential   • Green Top (Gel)   • Lavender Top   • Gold Top - SST   • Light Blue Top       Medications Given in the Emergency Department:  Medications   sodium chloride 0.9 % flush 10 mL (has no administration in time range)   dextrose (D50W) (25 g/50 mL) IV injection 25 g (25 g Intravenous Given 5/6/23 1615)        ED Course:    The patient was initially evaluated in the triage area where orders were placed. The patient was later dispositioned by Yusra Shabbir, PA.      The patient was advised to stay for completion of workup which includes but is not limited to communication of labs and radiological results, reassessment and plan. The patient was advised that leaving prior to disposition by a provider could result in critical findings that are not communicated to the patient.     ED Course as of 05/06/23 0257   Sat May 06, 2023   1552 PROVIDER IN TRIAGE  Patient was evaluated by me in triage, Mark Laguna PA-C.  Orders were placed and patient is currently awaiting final results and disposition.  [MD]   5353 Patient tolerated p.o. challenge.  Patient  oriented x3 at discharge. [YS]      ED Course User Index  [MD] Mark Laguna PA-C  [YS] Shabbir, Yusra, PA       Labs:    Lab Results (last 24 hours)     Procedure Component Value Units Date/Time    POC Glucose Once [[id number]]  (Normal) Collected: 05/06/23 1500    Specimen: Blood Updated: 05/06/23 1504     Glucose 97 mg/dL      Comment: Serial Number: [id number]:  [id number]       CBC & Differential [[id number]]  (Abnormal) Collected: 05/06/23 1541    Specimen: Blood Updated: 05/06/23 1549    Narrative:      The following orders were created for panel order CBC & Differential.  Procedure                               Abnormality         Status                     ---------                               -----------         ------                     CBC Auto Differential[[id number]]        Abnormal            Final result                 Please view results for these tests on the individual orders.    Comprehensive Metabolic Panel [[id number]]  (Abnormal) Collected: 05/06/23 1541    Specimen: Blood Updated: 05/06/23 1620     Glucose 67 mg/dL      BUN 21 mg/dL      Creatinine 0.75 mg/dL      Sodium 139 mmol/L      Potassium 3.8 mmol/L      Chloride 99 mmol/L      CO2 27.2 mmol/L      Calcium 9.3 mg/dL      Total Protein 8.2 g/dL      Albumin 4.4 g/dL      ALT (SGPT) 15 U/L      AST (SGOT) 15 U/L      Alkaline Phosphatase 74 U/L      Total Bilirubin 0.5 mg/dL      Globulin 3.8 gm/dL      A/G Ratio 1.2 g/dL      BUN/Creatinine Ratio 28.0     Anion Gap 12.8 mmol/L      eGFR 105.3 mL/min/1.73     Narrative:      GFR Normal >60  Chronic Kidney Disease <60  Kidney Failure <15      CBC Auto Differential [[id number]]  (Abnormal) Collected: 05/06/23 1541    Specimen: Blood Updated: 05/06/23 1549     WBC 9.25 10*3/mm3      RBC 5.34 10*6/mm3      Hemoglobin 15.1 g/dL      Hematocrit 44.2 %      MCV 82.8 fL      MCH 28.3 pg      MCHC 34.2 g/dL      RDW 12.8 %      RDW-SD 38.4 fl      MPV 10.8 fL      Platelets 222  10*3/mm3      Neutrophil % 83.7 %      Lymphocyte % 12.6 %      Monocyte % 2.9 %      Eosinophil % 0.2 %      Basophil % 0.3 %      Immature Grans % 0.3 %      Neutrophils, Absolute 7.73 10*3/mm3      Lymphocytes, Absolute 1.17 10*3/mm3      Monocytes, Absolute 0.27 10*3/mm3      Eosinophils, Absolute 0.02 10*3/mm3      Basophils, Absolute 0.03 10*3/mm3      Immature Grans, Absolute 0.03 10*3/mm3      nRBC 0.0 /100 WBC     POC Glucose Once [[id number]]  (Abnormal) Collected: 05/06/23 1606    Specimen: Blood Updated: 05/06/23 1610     Glucose 54 mg/dL      Comment: Serial Number: [id number]:  [id number]       POC Glucose Once [[id number]]  (Abnormal) Collected: 05/06/23 1643    Specimen: Blood Updated: 05/06/23 1645     Glucose 150 mg/dL      Comment: Serial Number: [id number]:  [id number]              Imaging:    No Radiology Exams Resulted Within Past 24 Hours      Differential Diagnosis and Discussion:      Altered Mental Status: Based on the patient's signs and symptoms, differential diagnosis includes but is not limited to meningitis, stroke, sepsis, subarachnoid hemorrhage, intracranial bleeding, encephalitis, and metabolic encephalopathy.    All labs were reviewed and interpreted by me.    MDM  Number of Diagnoses or Management Options  Diagnosis management comments: Patient arrived to the emergency department via EMS after having altered mental status due to low blood sugar.  Patient's family at bedside stated he has had multiple occurrences of low blood sugar in the past few weeks due to noncompliance with diabetic diet and checking blood sugar.  Patient's blood sugar at scene was 37.  POC blood glucose was 150 in the emergency department.  CBC unremarkable.  CMP unremarkable and negative for electrolyte deficiency or dehydration.  I considered doing a head CT due to patient's altered mental status prior to ED arrival, however, patient is alert oriented x3 after being given dextrose, intact  strength and sensation.  Neuro exam within normal limits.  Patient and family counseled about adherence to diabetic diet and checking blood sugar regularly.  Family states that they have an appointment with his diabetic doctor on Wednesday.  Family given strict return precautions regarding patient's mental status and blood glucose.            Patient Care Considerations:    CT HEAD: I considered ordering a noncontrast CT of the head, however Family at bedside states that patient was not acting like himself and then checked his blood sugar which was low.  Patient family states after patient received dextrose he immediately went back to being oriented x3.  Patient has had similar instances in the past with low blood sugar.      Consultants/Shared Management Plan:    I have discussed the case with Dr. Nielson who states that the patient can be safely discharged with close follow up.    Social Determinants of Health:    Patient has presented with family members who are responsible, reliable and will ensure follow up care.      Disposition and Care Coordination:    Discharged: I considered escalation of care by admitting this patient for observation, however the patient has improved and is suitable and  stable for discharge.    I have explained the patient´s condition, diagnoses and treatment plan based on the information available to me at this time. I have answered questions and addressed any concerns. The patient has a good  understanding of the patient´s diagnosis, condition, and treatment plan as can be expected at this point. The vital signs have been stable. The patient´s condition is stable and appropriate for discharge from the emergency department.      The patient will pursue further outpatient evaluation with the primary care physician or other designated or consulting physician as outlined in the discharge instructions. They are agreeable to this plan of care and follow-up instructions have been explained in  detail. The patient has received these instructions in written format and have expressed an understanding of the discharge instructions. The patient is aware that any significant change in condition or worsening of symptoms should prompt an immediate return to this or the closest emergency department or call to 911.  I have explained discharge medications and the need for follow up with the patient/caretakers. This was also printed in the discharge instructions. Patient was discharged with the following medications and follow up:      Medication List      No changes were made to your prescriptions during this visit.      Provider, No Known  [facility] [id number]      If symptoms worsen       Final diagnoses:   Hypoglycemia associated with diabetes        ED Disposition     ED Disposition   Discharge    Condition   Stable    Comment   --             This medical record created using voice recognition software.           Shabbir, Yusra, PA  05/06/23 0142

## 2023-06-06 ENCOUNTER — HOSPITAL ENCOUNTER (EMERGENCY)
Facility: HOSPITAL | Age: 58
Discharge: HOME | End: 2023-06-07
Attending: EMERGENCY MEDICINE
Payer: MEDICARE

## 2023-06-06 DIAGNOSIS — E16.2 HYPOGLYCEMIA: Primary | ICD-10-CM

## 2023-06-06 LAB
ALBUMIN SERPL-MCNC: 3.8 G/DL (ref 3.5–5.2)
ALBUMIN/GLOB SERPL: 1.1 G/DL
ALP SERPL-CCNC: 98 U/L (ref 39–117)
ALT SERPL W P-5'-P-CCNC: 26 U/L (ref 1–41)
ANION GAP SERPL CALCULATED.3IONS-SCNC: 11.2 MMOL/L (ref 5–15)
AST SERPL-CCNC: 28 U/L (ref 1–40)
BASOPHILS # BLD AUTO: 0.04 10*3/MM3 (ref 0–0.2)
BASOPHILS NFR BLD AUTO: 0.4 % (ref 0–1.5)
BILIRUB SERPL-MCNC: 0.3 MG/DL (ref 0–1.2)
BUN SERPL-MCNC: 18 MG/DL (ref 6–20)
BUN/CREAT SERPL: 22.5 (ref 7–25)
CALCIUM SPEC-SCNC: 9.2 MG/DL (ref 8.6–10.5)
CHLORIDE SERPL-SCNC: 101 MMOL/L (ref 98–107)
CO2 SERPL-SCNC: 25.8 MMOL/L (ref 22–29)
CREAT SERPL-MCNC: 0.8 MG/DL (ref 0.76–1.27)
DEPRECATED RDW RBC AUTO: 38.9 FL (ref 37–54)
EGFRCR SERPLBLD CKD-EPI 2021: 103.2 ML/MIN/1.73
EOSINOPHIL # BLD AUTO: 0.04 10*3/MM3 (ref 0–0.4)
EOSINOPHIL NFR BLD AUTO: 0.4 % (ref 0.3–6.2)
ERYTHROCYTE [DISTWIDTH] IN BLOOD BY AUTOMATED COUNT: 12.9 % (ref 12.3–15.4)
GLOBULIN UR ELPH-MCNC: 3.6 GM/DL
GLUCOSE BLDC GLUCOMTR-MCNC: 120 MG/DL (ref 70–99)
GLUCOSE BLDC GLUCOMTR-MCNC: 169 MG/DL (ref 70–99)
GLUCOSE SERPL-MCNC: 147 MG/DL (ref 65–99)
HCT VFR BLD AUTO: 44.2 % (ref 37.5–51)
HGB BLD-MCNC: 15.1 G/DL (ref 13–17.7)
IMM GRANULOCYTES # BLD AUTO: 0.06 10*3/MM3 (ref 0–0.05)
IMM GRANULOCYTES NFR BLD AUTO: 0.6 % (ref 0–0.5)
LYMPHOCYTES # BLD AUTO: 1.21 10*3/MM3 (ref 0.7–3.1)
LYMPHOCYTES NFR BLD AUTO: 11.2 % (ref 19.6–45.3)
MCH RBC QN AUTO: 28.4 PG (ref 26.6–33)
MCHC RBC AUTO-ENTMCNC: 34.2 G/DL (ref 31.5–35.7)
MCV RBC AUTO: 83.1 FL (ref 79–97)
MONOCYTES # BLD AUTO: 0.43 10*3/MM3 (ref 0.1–0.9)
MONOCYTES NFR BLD AUTO: 4 % (ref 5–12)
NEUTROPHILS NFR BLD AUTO: 83.4 % (ref 42.7–76)
NEUTROPHILS NFR BLD AUTO: 9.04 10*3/MM3 (ref 1.7–7)
NRBC BLD AUTO-RTO: 0 /100 WBC (ref 0–0.2)
PLATELET # BLD AUTO: 196 10*3/MM3 (ref 140–450)
PMV BLD AUTO: 10.9 FL (ref 6–12)
POTASSIUM SERPL-SCNC: 4 MMOL/L (ref 3.5–5.2)
PROT SERPL-MCNC: 7.4 G/DL (ref 6–8.5)
RBC # BLD AUTO: 5.32 10*6/MM3 (ref 4.14–5.8)
SODIUM SERPL-SCNC: 138 MMOL/L (ref 136–145)
WBC NRBC COR # BLD: 10.82 10*3/MM3 (ref 3.4–10.8)

## 2023-06-06 PROCEDURE — 36415 COLL VENOUS BLD VENIPUNCTURE: CPT

## 2023-06-06 PROCEDURE — 84443 ASSAY THYROID STIM HORMONE: CPT | Performed by: EMERGENCY MEDICINE

## 2023-06-06 PROCEDURE — 80053 COMPREHEN METABOLIC PANEL: CPT

## 2023-06-06 PROCEDURE — 99284 EMERGENCY DEPT VISIT MOD MDM: CPT

## 2023-06-06 PROCEDURE — 84484 ASSAY OF TROPONIN QUANT: CPT | Performed by: EMERGENCY MEDICINE

## 2023-06-06 PROCEDURE — 82948 REAGENT STRIP/BLOOD GLUCOSE: CPT

## 2023-06-06 PROCEDURE — 83735 ASSAY OF MAGNESIUM: CPT | Performed by: EMERGENCY MEDICINE

## 2023-06-06 PROCEDURE — 84439 ASSAY OF FREE THYROXINE: CPT | Performed by: EMERGENCY MEDICINE

## 2023-06-06 PROCEDURE — 85025 COMPLETE CBC W/AUTO DIFF WBC: CPT

## 2023-06-07 VITALS
BODY MASS INDEX: 28.69 KG/M2 | TEMPERATURE: 98.2 F | OXYGEN SATURATION: 95 % | HEART RATE: 82 BPM | HEIGHT: 76 IN | DIASTOLIC BLOOD PRESSURE: 73 MMHG | RESPIRATION RATE: 18 BRPM | SYSTOLIC BLOOD PRESSURE: 115 MMHG

## 2023-06-07 LAB
GEN 5 2HR TROPONIN T REFLEX: 31 NG/L
GLUCOSE BLDC GLUCOMTR-MCNC: 110 MG/DL (ref 70–99)
GLUCOSE BLDC GLUCOMTR-MCNC: 222 MG/DL (ref 70–99)
GLUCOSE BLDC GLUCOMTR-MCNC: 23 MG/DL (ref 70–99)
GLUCOSE BLDC GLUCOMTR-MCNC: 25 MG/DL (ref 70–99)
MAGNESIUM SERPL-MCNC: 2 MG/DL (ref 1.6–2.6)
T4 FREE SERPL-MCNC: 1.18 NG/DL (ref 0.93–1.7)
TROPONIN T DELTA: 10 NG/L
TROPONIN T SERPL HS-MCNC: 21 NG/L
TSH SERPL DL<=0.05 MIU/L-ACNC: 0.94 UIU/ML (ref 0.27–4.2)

## 2023-06-07 PROCEDURE — 82948 REAGENT STRIP/BLOOD GLUCOSE: CPT

## 2023-06-07 PROCEDURE — 93005 ELECTROCARDIOGRAM TRACING: CPT | Performed by: EMERGENCY MEDICINE

## 2023-06-07 PROCEDURE — 99284 EMERGENCY DEPT VISIT MOD MDM: CPT

## 2023-06-07 PROCEDURE — 93010 ELECTROCARDIOGRAM REPORT: CPT | Performed by: INTERNAL MEDICINE

## 2023-06-07 PROCEDURE — 96374 THER/PROPH/DIAG INJ IV PUSH: CPT

## 2023-06-07 PROCEDURE — 84484 ASSAY OF TROPONIN QUANT: CPT | Performed by: EMERGENCY MEDICINE

## 2023-06-07 RX ORDER — DEXTROSE MONOHYDRATE 25 G/50ML
25 INJECTION, SOLUTION INTRAVENOUS ONCE
Status: COMPLETED | OUTPATIENT
Start: 2023-06-07 | End: 2023-06-07

## 2023-06-07 RX ORDER — SODIUM CHLORIDE 0.9 % (FLUSH) 0.9 %
10 SYRINGE (ML) INJECTION AS NEEDED
Status: DISCONTINUED | OUTPATIENT
Start: 2023-06-07 | End: 2023-06-07 | Stop reason: HOSPADM

## 2023-06-07 RX ADMIN — DEXTROSE MONOHYDRATE 25 G: 25 INJECTION, SOLUTION INTRAVENOUS at 01:39

## 2023-06-07 RX ADMIN — SODIUM CHLORIDE 1000 ML: 9 INJECTION, SOLUTION INTRAVENOUS at 00:46

## 2023-06-07 NOTE — DISCHARGE INSTRUCTIONS
Please check your blood glucose every hour for the next 4 hours when you get home    Please hold all of your diabetic medication discussed your diabetic medication with your primary care physician tomorrow.    Return to the emergency immediately for altered mental status, chest pain, chest pressure, shortness of breath, near passing out, passing out, sustained palpitations, blood glucose greater than 70 or blood glucose greater than 400

## 2023-06-07 NOTE — ED PROVIDER NOTES
Time: 10:07 PM EDT  Date of encounter:  6/6/2023  Independent Historian/Clinical History and Information was obtained by:   Patient  Chief Complaint   Patient presents with    Hypoglycemia     Pt was found down by family, unknown down time. Blood glucose was in the 30's at EMS arrival. Unable to obtain line, oral glucose give. Pt became alert and oriented. Blood glucose 169 in triage       History is limited by: N/A    History of Present Illness:  Patient is a 58 y.o. year old male who presents to the emergency department for evaluation of hypoglycemia  Patient is diabetic, and was found in bed unresponsive. Patient was in bed for an undetermined amount of time, but he remembers that he ate breakfast and took sugar pills, and he did not have lunch. Patient takes insulin as needed, whenever his blood sugar gets too low. Patient was at blood glucose level in the 30s when EMS arrived. Patient's girlfriend called EMS when they found him unresponsive. His mental status improved after EMS gave him glucose.   Patient has a history of atrial fibrillation, which he is taking medication for but cannot remember what the medication is.      Patient Care Team  Primary Care Provider: Provider, Jeanine Known    Past Medical History:     Allergies   Allergen Reactions    Penicillins Itching     Past Medical History:   Diagnosis Date    Diabetes mellitus      History reviewed. No pertinent surgical history.  History reviewed. No pertinent family history.    Home Medications:  Prior to Admission medications    Medication Sig Start Date End Date Taking? Authorizing Provider   aspirin 81 MG EC tablet Daily.    Keli Hernandez MD   carvedilol (COREG) 25 MG tablet  4/4/23   Keli Hernandez MD   cetirizine (zyrTEC) 10 MG tablet cetirizine 10 mg tablet    Keli Hernandez MD   Cholecalciferol 25 MCG (1000 UT) tablet cholecalciferol (vitamin D3) 25 mcg (1,000 unit) tablet    Keli Hernandez MD   dapagliflozin-metformin HCl  ER (Xigduo XR) 5-1000 MG tablet Daily.    Keli Hernandez MD   HUMULIN R 500 UNIT/ML CONCENTRATED injection INJECT 380 UNITS A DAY VIA PUMP  UNITS THREE TIMES DAILY 1/13/23   Keli Hernandez MD   insulin regular (HUMULIN R) 500 UNIT/ML CONCENTRATED injection Humulin R U-500 (Concentrated) Insulin 500 unit/mL subcutaneous soln   INJECT 380 UNITS A DAY VIA PUMP  UNITS THREE TIMES DAILY    Keli Hernandez MD   levothyroxine (SYNTHROID, LEVOTHROID) 25 MCG tablet Take 1 tablet by mouth Daily.    Keli Hernandez MD   Omega-3 Fatty Acids (fish oil) 1000 MG capsule capsule Every 12 (Twelve) Hours.    Keli Hernandez MD   Xigduo XR 5-1000 MG tablet  4/4/23   Keli Hernandez MD        Social History:   Social History     Tobacco Use    Smoking status: Every Day     Packs/day: 1.00     Years: 15.00     Pack years: 15.00     Types: Cigarettes    Smokeless tobacco: Never   Vaping Use    Vaping Use: Never used   Substance Use Topics    Alcohol use: Not Currently    Drug use: Not Currently         Review of Systems:  Review of Systems   Constitutional:  Negative for chills, diaphoresis and fever.   HENT:  Negative for congestion, postnasal drip, rhinorrhea and sore throat.    Eyes:  Negative for photophobia.   Respiratory:  Negative for cough, chest tightness and shortness of breath.    Cardiovascular:  Negative for chest pain, palpitations and leg swelling.   Gastrointestinal:  Negative for abdominal pain, diarrhea, nausea and vomiting.   Genitourinary:  Negative for difficulty urinating, dysuria, flank pain, frequency, hematuria and urgency.   Musculoskeletal:  Negative for neck pain and neck stiffness.   Skin:  Negative for pallor and rash.   Neurological:  Negative for dizziness, syncope, weakness, numbness and headaches.   Hematological:  Negative for adenopathy. Does not bruise/bleed easily.   Psychiatric/Behavioral: Negative.        Physical Exam:  /73   Pulse 82    "Temp 98.2 °F (36.8 °C)   Resp 18   Ht 193 cm (76\")   SpO2 95%   BMI 28.69 kg/m²     Physical Exam  Vitals and nursing note reviewed.   Constitutional:       General: He is not in acute distress.     Appearance: Normal appearance. He is ill-appearing. He is not toxic-appearing or diaphoretic.   HENT:      Head: Normocephalic and atraumatic.      Mouth/Throat:      Mouth: Mucous membranes are moist.   Eyes:      Pupils: Pupils are equal, round, and reactive to light.   Cardiovascular:      Rate and Rhythm: Tachycardia present. Rhythm irregular.      Pulses: Normal pulses.           Carotid pulses are 2+ on the right side and 2+ on the left side.       Radial pulses are 2+ on the right side and 2+ on the left side.        Femoral pulses are 2+ on the right side and 2+ on the left side.       Popliteal pulses are 2+ on the right side and 2+ on the left side.        Dorsalis pedis pulses are 2+ on the right side and 2+ on the left side.        Posterior tibial pulses are 2+ on the right side and 2+ on the left side.      Heart sounds: Normal heart sounds. No murmur heard.  Pulmonary:      Effort: Pulmonary effort is normal. No accessory muscle usage, respiratory distress or retractions.      Breath sounds: Examination of the right-upper field reveals decreased breath sounds. Examination of the left-upper field reveals decreased breath sounds. Examination of the right-middle field reveals decreased breath sounds. Examination of the left-middle field reveals decreased breath sounds. Examination of the right-lower field reveals decreased breath sounds. Examination of the left-lower field reveals decreased breath sounds. Decreased breath sounds present. No wheezing, rhonchi or rales.   Abdominal:      General: Abdomen is flat. There is no distension.      Palpations: Abdomen is soft. There is no mass or pulsatile mass.      Tenderness: There is no abdominal tenderness. There is no right CVA tenderness, left CVA " tenderness, guarding or rebound.      Comments: No rigidity   Musculoskeletal:         General: No swelling, tenderness or deformity.      Cervical back: Neck supple. No tenderness.      Right lower leg: No edema.      Left lower leg: No edema.   Skin:     General: Skin is warm and dry.      Capillary Refill: Capillary refill takes less than 2 seconds.      Coloration: Skin is not jaundiced or pale.      Findings: No erythema.   Neurological:      General: No focal deficit present.      Mental Status: He is alert and oriented to person, place, and time. Mental status is at baseline.      Cranial Nerves: Cranial nerves 2-12 are intact. No cranial nerve deficit.      Sensory: Sensation is intact. No sensory deficit.      Motor: Motor function is intact. No weakness or pronator drift.      Coordination: Coordination is intact. Coordination normal.   Psychiatric:         Mood and Affect: Mood normal.         Behavior: Behavior normal.                Procedures:  Procedures      Medical Decision Making:      Comorbidities that affect care:    Atrial Fibrillation, Diabetes    External Notes reviewed:    None      The following orders were placed and all results were independently analyzed by me:  Orders Placed This Encounter   Procedures    Comprehensive Metabolic Panel    CBC Auto Differential    Magnesium    T4, Free    TSH    High Sensitivity Troponin T    High Sensitivity Troponin T 2Hr    Continuous Pulse Oximetry    POC Glucose STAT    POC Glucose Once    POC Glucose Once    POC Glucose STAT    POC Glucose Once    POC Glucose Once    POC Glucose Once    POC Glucose Once    ECG 12 Lead Rhythm Change    ECG 12 Lead Rhythm Change    CBC & Differential       Medications Given in the Emergency Department:  Medications   sodium chloride 0.9 % bolus 1,000 mL (0 mL Intravenous Stopped 6/7/23 0339)   dextrose (D50W) (25 g/50 mL) IV injection 25 g (25 g Intravenous Given 6/7/23 0139)        ED Course:    The patient was  initially evaluated in the triage area where orders were placed. The patient was later dispositioned by Enrrique Gil DO.      The patient was advised to stay for completion of workup which includes but is not limited to communication of labs and radiological results, reassessment and plan. The patient was advised that leaving prior to disposition by a provider could result in critical findings that are not communicated to the patient.     ED Course as of 06/08/23 0324   Wed Jun 07, 2023 0126 EKG:    Rhythm: Sinus rhythm  Rate: 83   Intervals: short LA normal QT interval  T-wave: Nonspecific T wave flattening in I, T wave inversion in aVL  ST Segment: No pathological ST elevation or reciprocal ST depression to suggest acute myocardial infarction    EKG Comparison: EKG is changed from EKG that was performed April 28, 2017 as the EKG demonstrates atrial fibrillation    Interpreted by me   [SD]   0416 EKG:    Rhythm: Sinus rhythm  Rate: 83  Intervals: Borderline short LA and normal QT interval  T-wave: Nonspecific T wave flattening in I, T wave inversion in aVL  ST Segment: No pathological ST ovation reciprocal ST depression to suggest acute myocardial infarction    EKG Comparison: No change in the QRS and ST morphology from the EKG it was performed earlier in the department    Interpreted by me   [SD]      ED Course User Index  [SD] Enrrique Gil DO       Labs:    Lab Results (last 24 hours)       Procedure Component Value Units Date/Time    POC Glucose Once [[id number]]  (Abnormal) Collected: 06/07/23 0330    Specimen: Blood Updated: 06/07/23 0332     Glucose 222 mg/dL      Comment: Serial Number: [id number]:  [id number]       High Sensitivity Troponin T 2Hr [[id number]]  (Abnormal) Collected: 06/07/23 0339    Specimen: Blood Updated: 06/07/23 0408     HS Troponin T 31 ng/L      Troponin T Delta 10 ng/L     Narrative:      High Sensitive Troponin T Reference Range:  <10.0 ng/L- Negative Female for AMI  <15.0  ng/L- Negative Male for AMI  >=10 - Abnormal Female indicating possible myocardial injury.  >=15 - Abnormal Male indicating possible myocardial injury.   Clinicians would have to utilize clinical acumen, EKG, Troponin, and serial changes to determine if it is an Acute Myocardial Infarction or myocardial injury due to an underlying chronic condition.                  Imaging:    No Radiology Exams Resulted Within Past 24 Hours      Differential Diagnosis and Discussion:      Cardiac Arrest: Differential diagnosis includes but is not limited to ventricular tachycardia, ventricular fibrillation, asystole, PEA, respiratory arrest due to hypoxia or metabolic abnormalities.    All labs were reviewed and interpreted by me.    MDM  Number of Diagnoses or Management Options  Hypoglycemia  Diagnosis management comments:     The patient appeared to be SVT on the monitor and I evaluated the patient..  I immediately ordered an EKG.  By the time I ordered EKG the patient was back into a normal sinus rhythm..  The patient converted on his own.  The patient was observed in the emergency room for another 3 hours and had no dysrhythmias.  The patient was asymptomatic the entire time he had the SVT.  In fact, the patient was not aware that he had the tacky dysrhythmia    The patient's CMP was reviewed and shows no abnormalities of critical concern.  Of note, the patient's sodium and potassium are acceptable.  The patient's liver enzymes are unremarkable.  The patient's renal function including creatinine is preserved.  The patient has a normal anion gap.    The patient's CBC was reviewed and shows no abnormalities of critical concern.  Of note, there is no anemia requiring a blood transfusion and the platelet count is acceptable    Patient had 2 EKGs that demonstrated no obvious evidence for STEMI or other acute pathology or dysrhythmia    The patient was given an amp of D50 due to the hypoglycemia he did develop in the emergency  room.    The patient was given food which he tolerated without difficulty and p.o. fluids.  Patient was able to maintain his blood glucose in the emergency room.  The patient's blood glucose was elevated to 222.  The patient will be discharged home.  Patient will hold his diabetic and discuss it with his primary care physician tomorrow    The patient was given very specific instructions on when and why to return to the emergency room.  The patient voiced understanding and felt comfortable with the discharge instructions.  They would return to the emergency room if necessary.  The patient appears appropriate for discharge and outpatient follow-up.         Amount and/or Complexity of Data Reviewed  Clinical lab tests: reviewed  Tests in the medicine section of CPT®: reviewed           Social Determinants of Health:    Patient is independent, reliable, and has access to care.       Disposition and Care Coordination:    Discharged: The patient is suitable and stable for discharge with no need for consideration of observation or admission.    I have explained discharge medications and the need for follow up with the patient/caretakers. This was also printed in the discharge instructions. Patient was discharged with the following medications and follow up:      Medication List        Changed      Xigduo XR 5-1000 MG tablet  Generic drug: dapagliflozin-metformin HCl ER  What changed: Another medication with the same name was removed. Continue taking this medication, and follow the directions you see here.            Stop      HUMULIN R 500 UNIT/ML CONCENTRATED injection  Generic drug: insulin regular           Provider, No Known  [facility]  [place] [id number]    On 6/8/2023  hypoglycemia, call for appointment       Final diagnoses:   Hypoglycemia        ED Disposition       ED Disposition   Discharge    Condition   Stable    Comment   --               This medical record created using voice recognition  software.             Edith Nelson  06/07/23 0039       Enrrique Gil DO  06/08/23 0324

## 2023-06-08 LAB
QT INTERVAL: 379 MS
QT INTERVAL: 380 MS

## 2024-02-08 ENCOUNTER — APPOINTMENT (OUTPATIENT)
Dept: GENERAL RADIOLOGY | Facility: HOSPITAL | Age: 59
End: 2024-02-08
Payer: MEDICARE

## 2024-02-08 ENCOUNTER — HOSPITAL ENCOUNTER (INPATIENT)
Facility: HOSPITAL | Age: 59
LOS: 6 days | Discharge: HOME | End: 2024-02-15
Attending: EMERGENCY MEDICINE | Admitting: STUDENT IN AN ORGANIZED HEALTH CARE EDUCATION/TRAINING PROGRAM
Payer: MEDICARE

## 2024-02-08 DIAGNOSIS — J81.0 ACUTE PULMONARY EDEMA: ICD-10-CM

## 2024-02-08 DIAGNOSIS — R26.2 DIFFICULTY WALKING: ICD-10-CM

## 2024-02-08 DIAGNOSIS — J10.1 INFLUENZA A: ICD-10-CM

## 2024-02-08 DIAGNOSIS — R60.0 PEDAL EDEMA: ICD-10-CM

## 2024-02-08 DIAGNOSIS — I48.91 ATRIAL FIBRILLATION WITH RAPID VENTRICULAR RESPONSE: Primary | ICD-10-CM

## 2024-02-08 DIAGNOSIS — Z78.9 DECREASED ACTIVITIES OF DAILY LIVING (ADL): ICD-10-CM

## 2024-02-08 PROCEDURE — 36415 COLL VENOUS BLD VENIPUNCTURE: CPT

## 2024-02-08 PROCEDURE — 99291 CRITICAL CARE FIRST HOUR: CPT

## 2024-02-08 PROCEDURE — 93005 ELECTROCARDIOGRAM TRACING: CPT

## 2024-02-08 PROCEDURE — 71045 X-RAY EXAM CHEST 1 VIEW: CPT

## 2024-02-08 PROCEDURE — 87637 SARSCOV2&INF A&B&RSV AMP PRB: CPT | Performed by: EMERGENCY MEDICINE

## 2024-02-08 PROCEDURE — 93005 ELECTROCARDIOGRAM TRACING: CPT | Performed by: EMERGENCY MEDICINE

## 2024-02-08 RX ORDER — SODIUM CHLORIDE 0.9 % (FLUSH) 0.9 %
10 SYRINGE (ML) INJECTION AS NEEDED
Status: DISCONTINUED | OUTPATIENT
Start: 2024-02-08 | End: 2024-02-09

## 2024-02-09 ENCOUNTER — APPOINTMENT (OUTPATIENT)
Dept: ULTRASOUND IMAGING | Facility: HOSPITAL | Age: 59
End: 2024-02-09
Payer: MEDICARE

## 2024-02-09 ENCOUNTER — APPOINTMENT (OUTPATIENT)
Dept: CARDIOLOGY | Facility: HOSPITAL | Age: 59
End: 2024-02-09
Payer: MEDICARE

## 2024-02-09 PROBLEM — J10.1 INFLUENZA A: Status: ACTIVE | Noted: 2024-02-09

## 2024-02-09 PROBLEM — I48.91 ATRIAL FIBRILLATION WITH RVR: Status: ACTIVE | Noted: 2024-02-09

## 2024-02-09 LAB
ALBUMIN SERPL-MCNC: 3.5 G/DL (ref 3.5–5.2)
ALBUMIN/GLOB SERPL: 1.1 G/DL
ALP SERPL-CCNC: 63 U/L (ref 39–117)
ALT SERPL W P-5'-P-CCNC: 7 U/L (ref 1–41)
AMMONIA BLD-SCNC: 24 UMOL/L (ref 16–60)
ANION GAP SERPL CALCULATED.3IONS-SCNC: 13.4 MMOL/L (ref 5–15)
ANION GAP SERPL CALCULATED.3IONS-SCNC: 14.4 MMOL/L (ref 5–15)
ANISOCYTOSIS BLD QL: NORMAL
AST SERPL-CCNC: 18 U/L (ref 1–40)
BASOPHILS # BLD AUTO: 0.01 10*3/MM3 (ref 0–0.2)
BASOPHILS # BLD AUTO: 0.03 10*3/MM3 (ref 0–0.2)
BASOPHILS NFR BLD AUTO: 0.1 % (ref 0–1.5)
BASOPHILS NFR BLD AUTO: 0.4 % (ref 0–1.5)
BH CV ECHO MEAS - AO MAX PG: 4.4 MMHG
BH CV ECHO MEAS - AO ROOT DIAM: 2.8 CM
BH CV ECHO MEAS - AO V2 MAX: 105 CM/SEC
BH CV ECHO MEAS - AVA(I,D): 2.3 CM2
BH CV ECHO MEAS - EDV(CUBED): 154.9 ML
BH CV ECHO MEAS - EDV(MOD-SP2): 138 ML
BH CV ECHO MEAS - EDV(MOD-SP4): 102 ML
BH CV ECHO MEAS - EF(MOD-BP): 34.7 %
BH CV ECHO MEAS - EF(MOD-SP2): 47 %
BH CV ECHO MEAS - EF(MOD-SP4): 25.8 %
BH CV ECHO MEAS - ESV(CUBED): 62.1 ML
BH CV ECHO MEAS - ESV(MOD-SP2): 73.1 ML
BH CV ECHO MEAS - ESV(MOD-SP4): 75.7 ML
BH CV ECHO MEAS - FS: 26.3 %
BH CV ECHO MEAS - IVS/LVPW: 1.37 CM
BH CV ECHO MEAS - IVSD: 1 CM
BH CV ECHO MEAS - LA DIMENSION: 4.8 CM
BH CV ECHO MEAS - LAT PEAK E' VEL: 8.6 CM/SEC
BH CV ECHO MEAS - LV DIASTOLIC VOL/BSA (35-75): 42.2 CM2
BH CV ECHO MEAS - LV MASS(C)D: 169.7 GRAMS
BH CV ECHO MEAS - LV MAX PG: 2.46 MMHG
BH CV ECHO MEAS - LV SYSTOLIC VOL/BSA (12-30): 31.3 CM2
BH CV ECHO MEAS - LV V1 MAX: 78.4 CM/SEC
BH CV ECHO MEAS - LVIDD: 5.4 CM
BH CV ECHO MEAS - LVIDS: 4 CM
BH CV ECHO MEAS - LVOT AREA: 3.1 CM2
BH CV ECHO MEAS - LVOT DIAM: 2 CM
BH CV ECHO MEAS - LVPWD: 0.73 CM
BH CV ECHO MEAS - MED PEAK E' VEL: 7.3 CM/SEC
BH CV ECHO MEAS - MV DEC TIME: 0.13 SEC
BH CV ECHO MEAS - MV E MAX VEL: 120 CM/SEC
BH CV ECHO MEAS - PA ACC TIME: 0.09 SEC
BH CV ECHO MEAS - PA V2 MAX: 67.9 CM/SEC
BH CV ECHO MEAS - SI(MOD-SP2): 26.9 ML/M2
BH CV ECHO MEAS - SI(MOD-SP4): 10.9 ML/M2
BH CV ECHO MEAS - SV(MOD-SP2): 64.9 ML
BH CV ECHO MEAS - SV(MOD-SP4): 26.3 ML
BH CV ECHO MEAS - TAPSE (>1.6): 1.25 CM
BH CV ECHO MEAS - TR MAX PG: 38.9 MMHG
BH CV ECHO MEAS - TR MAX VEL: 312 CM/SEC
BH CV ECHO MEASUREMENTS AVERAGE E/E' RATIO: 15.09
BH CV XLRA - RV BASE: 4.9 CM
BH CV XLRA - RV MID: 4 CM
BH CV XLRA - TDI S': 11.2 CM/SEC
BILIRUB SERPL-MCNC: 1.2 MG/DL (ref 0–1.2)
BUN SERPL-MCNC: 19 MG/DL (ref 6–20)
BUN SERPL-MCNC: 22 MG/DL (ref 6–20)
BUN/CREAT SERPL: 15 (ref 7–25)
BUN/CREAT SERPL: 15.1 (ref 7–25)
BURR CELLS BLD QL SMEAR: NORMAL
CALCIUM SPEC-SCNC: 8.3 MG/DL (ref 8.6–10.5)
CALCIUM SPEC-SCNC: 8.5 MG/DL (ref 8.6–10.5)
CHLORIDE SERPL-SCNC: 88 MMOL/L (ref 98–107)
CHLORIDE SERPL-SCNC: 89 MMOL/L (ref 98–107)
CK SERPL-CCNC: 286 U/L (ref 20–200)
CO2 SERPL-SCNC: 28.6 MMOL/L (ref 22–29)
CO2 SERPL-SCNC: 28.6 MMOL/L (ref 22–29)
CREAT SERPL-MCNC: 1.26 MG/DL (ref 0.76–1.27)
CREAT SERPL-MCNC: 1.47 MG/DL (ref 0.76–1.27)
DEPRECATED RDW RBC AUTO: 40.1 FL (ref 37–54)
DEPRECATED RDW RBC AUTO: 41.1 FL (ref 37–54)
EGFRCR SERPLBLD CKD-EPI 2021: 54.9 ML/MIN/1.73
EGFRCR SERPLBLD CKD-EPI 2021: 66.1 ML/MIN/1.73
EOSINOPHIL # BLD AUTO: 0 10*3/MM3 (ref 0–0.4)
EOSINOPHIL # BLD AUTO: 0.02 10*3/MM3 (ref 0–0.4)
EOSINOPHIL NFR BLD AUTO: 0 % (ref 0.3–6.2)
EOSINOPHIL NFR BLD AUTO: 0.3 % (ref 0.3–6.2)
ERYTHROCYTE [DISTWIDTH] IN BLOOD BY AUTOMATED COUNT: 13.4 % (ref 12.3–15.4)
ERYTHROCYTE [DISTWIDTH] IN BLOOD BY AUTOMATED COUNT: 13.4 % (ref 12.3–15.4)
FLUAV SUBTYP SPEC NAA+PROBE: DETECTED
FLUBV RNA ISLT QL NAA+PROBE: NOT DETECTED
GEN 5 2HR TROPONIN T REFLEX: 23 NG/L
GLOBULIN UR ELPH-MCNC: 3.3 GM/DL
GLUCOSE BLDC GLUCOMTR-MCNC: 162 MG/DL (ref 70–99)
GLUCOSE BLDC GLUCOMTR-MCNC: 185 MG/DL (ref 70–99)
GLUCOSE BLDC GLUCOMTR-MCNC: 316 MG/DL (ref 70–99)
GLUCOSE BLDC GLUCOMTR-MCNC: 387 MG/DL (ref 70–99)
GLUCOSE SERPL-MCNC: 369 MG/DL (ref 65–99)
GLUCOSE SERPL-MCNC: 374 MG/DL (ref 65–99)
HBA1C MFR BLD: 15.9 % (ref 4.8–5.6)
HCT VFR BLD AUTO: 36.9 % (ref 37.5–51)
HCT VFR BLD AUTO: 40 % (ref 37.5–51)
HGB BLD-MCNC: 12.2 G/DL (ref 13–17.7)
HGB BLD-MCNC: 13.1 G/DL (ref 13–17.7)
HOLD SPECIMEN: NORMAL
HOLD SPECIMEN: NORMAL
IMM GRANULOCYTES # BLD AUTO: 0.02 10*3/MM3 (ref 0–0.05)
IMM GRANULOCYTES # BLD AUTO: 0.03 10*3/MM3 (ref 0–0.05)
IMM GRANULOCYTES NFR BLD AUTO: 0.3 % (ref 0–0.5)
IMM GRANULOCYTES NFR BLD AUTO: 0.4 % (ref 0–0.5)
INR PPP: 2.24 (ref 0.86–1.15)
IVRT: 32 MS
LARGE PLATELETS: NORMAL
LEFT ATRIUM VOLUME INDEX: 41.3 ML/M2
LYMPHOCYTES # BLD AUTO: 0.79 10*3/MM3 (ref 0.7–3.1)
LYMPHOCYTES # BLD AUTO: 0.94 10*3/MM3 (ref 0.7–3.1)
LYMPHOCYTES NFR BLD AUTO: 13 % (ref 19.6–45.3)
LYMPHOCYTES NFR BLD AUTO: 9.9 % (ref 19.6–45.3)
MAGNESIUM SERPL-MCNC: 1.3 MG/DL (ref 1.6–2.6)
MCH RBC QN AUTO: 27 PG (ref 26.6–33)
MCH RBC QN AUTO: 27.5 PG (ref 26.6–33)
MCHC RBC AUTO-ENTMCNC: 32.8 G/DL (ref 31.5–35.7)
MCHC RBC AUTO-ENTMCNC: 33.1 G/DL (ref 31.5–35.7)
MCV RBC AUTO: 82.5 FL (ref 79–97)
MCV RBC AUTO: 83.1 FL (ref 79–97)
MONOCYTES # BLD AUTO: 0.55 10*3/MM3 (ref 0.1–0.9)
MONOCYTES # BLD AUTO: 0.67 10*3/MM3 (ref 0.1–0.9)
MONOCYTES NFR BLD AUTO: 7.6 % (ref 5–12)
MONOCYTES NFR BLD AUTO: 8.4 % (ref 5–12)
NEUTROPHILS NFR BLD AUTO: 5.68 10*3/MM3 (ref 1.7–7)
NEUTROPHILS NFR BLD AUTO: 6.44 10*3/MM3 (ref 1.7–7)
NEUTROPHILS NFR BLD AUTO: 78.9 % (ref 42.7–76)
NEUTROPHILS NFR BLD AUTO: 80.7 % (ref 42.7–76)
NRBC BLD AUTO-RTO: 0 /100 WBC (ref 0–0.2)
NRBC BLD AUTO-RTO: 0 /100 WBC (ref 0–0.2)
NT-PROBNP SERPL-MCNC: 4238 PG/ML (ref 0–900)
PLATELET # BLD AUTO: 75 10*3/MM3 (ref 140–450)
PLATELET # BLD AUTO: 85 10*3/MM3 (ref 140–450)
PMV BLD AUTO: 12.1 FL (ref 6–12)
PMV BLD AUTO: 12.6 FL (ref 6–12)
POIKILOCYTOSIS BLD QL SMEAR: NORMAL
POTASSIUM SERPL-SCNC: 3.3 MMOL/L (ref 3.5–5.2)
POTASSIUM SERPL-SCNC: 3.8 MMOL/L (ref 3.5–5.2)
PROT SERPL-MCNC: 6.8 G/DL (ref 6–8.5)
PROTHROMBIN TIME: 25.2 SECONDS (ref 11.8–14.9)
QT INTERVAL: 289 MS
QT INTERVAL: 310 MS
QT INTERVAL: 315 MS
QT INTERVAL: 372 MS
QTC INTERVAL: 463 MS
QTC INTERVAL: 473 MS
QTC INTERVAL: 485 MS
QTC INTERVAL: 488 MS
RBC # BLD AUTO: 4.44 10*6/MM3 (ref 4.14–5.8)
RBC # BLD AUTO: 4.85 10*6/MM3 (ref 4.14–5.8)
RSV RNA NPH QL NAA+NON-PROBE: NOT DETECTED
SARS-COV-2 RNA RESP QL NAA+PROBE: NOT DETECTED
SODIUM SERPL-SCNC: 131 MMOL/L (ref 136–145)
SODIUM SERPL-SCNC: 131 MMOL/L (ref 136–145)
TROPONIN T DELTA: -1 NG/L
TROPONIN T SERPL HS-MCNC: 24 NG/L
TROPONIN T SERPL HS-MCNC: 27 NG/L
WBC MORPH BLD: NORMAL
WBC NRBC COR # BLD AUTO: 7.21 10*3/MM3 (ref 3.4–10.8)
WBC NRBC COR # BLD AUTO: 7.97 10*3/MM3 (ref 3.4–10.8)
WHOLE BLOOD HOLD COAG: NORMAL
WHOLE BLOOD HOLD SPECIMEN: NORMAL

## 2024-02-09 PROCEDURE — 82550 ASSAY OF CK (CPK): CPT | Performed by: STUDENT IN AN ORGANIZED HEALTH CARE EDUCATION/TRAINING PROGRAM

## 2024-02-09 PROCEDURE — 63710000001 INSULIN DETEMIR PER 5 UNITS: Performed by: INTERNAL MEDICINE

## 2024-02-09 PROCEDURE — 25810000003 SODIUM CHLORIDE 0.9 % SOLUTION: Performed by: EMERGENCY MEDICINE

## 2024-02-09 PROCEDURE — 93306 TTE W/DOPPLER COMPLETE: CPT | Performed by: STUDENT IN AN ORGANIZED HEALTH CARE EDUCATION/TRAINING PROGRAM

## 2024-02-09 PROCEDURE — 25010000002 SULFUR HEXAFLUORIDE MICROSPH 60.7-25 MG RECONSTITUTED SUSPENSION: Performed by: INTERNAL MEDICINE

## 2024-02-09 PROCEDURE — 83880 ASSAY OF NATRIURETIC PEPTIDE: CPT | Performed by: EMERGENCY MEDICINE

## 2024-02-09 PROCEDURE — 80053 COMPREHEN METABOLIC PANEL: CPT | Performed by: EMERGENCY MEDICINE

## 2024-02-09 PROCEDURE — 85610 PROTHROMBIN TIME: CPT

## 2024-02-09 PROCEDURE — 83735 ASSAY OF MAGNESIUM: CPT | Performed by: INTERNAL MEDICINE

## 2024-02-09 PROCEDURE — 76775 US EXAM ABDO BACK WALL LIM: CPT

## 2024-02-09 PROCEDURE — 85025 COMPLETE CBC W/AUTO DIFF WBC: CPT | Performed by: STUDENT IN AN ORGANIZED HEALTH CARE EDUCATION/TRAINING PROGRAM

## 2024-02-09 PROCEDURE — 83036 HEMOGLOBIN GLYCOSYLATED A1C: CPT | Performed by: INTERNAL MEDICINE

## 2024-02-09 PROCEDURE — 25010000002 FUROSEMIDE PER 20 MG: Performed by: INTERNAL MEDICINE

## 2024-02-09 PROCEDURE — 25010000002 MAGNESIUM SULFATE 4 GM/100ML SOLUTION: Performed by: INTERNAL MEDICINE

## 2024-02-09 PROCEDURE — 82948 REAGENT STRIP/BLOOD GLUCOSE: CPT | Performed by: STUDENT IN AN ORGANIZED HEALTH CARE EDUCATION/TRAINING PROGRAM

## 2024-02-09 PROCEDURE — 93005 ELECTROCARDIOGRAM TRACING: CPT | Performed by: STUDENT IN AN ORGANIZED HEALTH CARE EDUCATION/TRAINING PROGRAM

## 2024-02-09 PROCEDURE — 85007 BL SMEAR W/DIFF WBC COUNT: CPT | Performed by: EMERGENCY MEDICINE

## 2024-02-09 PROCEDURE — 84484 ASSAY OF TROPONIN QUANT: CPT | Performed by: STUDENT IN AN ORGANIZED HEALTH CARE EDUCATION/TRAINING PROGRAM

## 2024-02-09 PROCEDURE — 93306 TTE W/DOPPLER COMPLETE: CPT

## 2024-02-09 PROCEDURE — 84484 ASSAY OF TROPONIN QUANT: CPT | Performed by: EMERGENCY MEDICINE

## 2024-02-09 PROCEDURE — 82140 ASSAY OF AMMONIA: CPT

## 2024-02-09 PROCEDURE — 82948 REAGENT STRIP/BLOOD GLUCOSE: CPT

## 2024-02-09 PROCEDURE — 63710000001 INSULIN LISPRO (HUMAN) PER 5 UNITS: Performed by: STUDENT IN AN ORGANIZED HEALTH CARE EDUCATION/TRAINING PROGRAM

## 2024-02-09 PROCEDURE — 85025 COMPLETE CBC W/AUTO DIFF WBC: CPT | Performed by: EMERGENCY MEDICINE

## 2024-02-09 PROCEDURE — 25010000002 FUROSEMIDE PER 20 MG: Performed by: STUDENT IN AN ORGANIZED HEALTH CARE EDUCATION/TRAINING PROGRAM

## 2024-02-09 PROCEDURE — 99223 1ST HOSP IP/OBS HIGH 75: CPT | Performed by: STUDENT IN AN ORGANIZED HEALTH CARE EDUCATION/TRAINING PROGRAM

## 2024-02-09 PROCEDURE — 93005 ELECTROCARDIOGRAM TRACING: CPT | Performed by: INTERNAL MEDICINE

## 2024-02-09 RX ORDER — ASPIRIN 81 MG/1
81 TABLET ORAL DAILY
Status: DISCONTINUED | OUTPATIENT
Start: 2024-02-09 | End: 2024-02-15 | Stop reason: HOSPADM

## 2024-02-09 RX ORDER — LEVOTHYROXINE SODIUM 0.03 MG/1
25 TABLET ORAL
Status: DISCONTINUED | OUTPATIENT
Start: 2024-02-09 | End: 2024-02-15 | Stop reason: HOSPADM

## 2024-02-09 RX ORDER — SODIUM CHLORIDE 0.9 % (FLUSH) 0.9 %
10 SYRINGE (ML) INJECTION EVERY 12 HOURS SCHEDULED
Status: DISCONTINUED | OUTPATIENT
Start: 2024-02-09 | End: 2024-02-15 | Stop reason: HOSPADM

## 2024-02-09 RX ORDER — ROSUVASTATIN CALCIUM 20 MG/1
20 TABLET, COATED ORAL NIGHTLY
Status: DISCONTINUED | OUTPATIENT
Start: 2024-02-09 | End: 2024-02-15 | Stop reason: HOSPADM

## 2024-02-09 RX ORDER — CEFDINIR 300 MG/1
300 CAPSULE ORAL 2 TIMES DAILY
COMMUNITY
Start: 2024-02-08 | End: 2024-02-15 | Stop reason: HOSPADM

## 2024-02-09 RX ORDER — BISACODYL 5 MG/1
5 TABLET, DELAYED RELEASE ORAL DAILY PRN
Status: DISCONTINUED | OUTPATIENT
Start: 2024-02-09 | End: 2024-02-15 | Stop reason: HOSPADM

## 2024-02-09 RX ORDER — AMOXICILLIN 250 MG
2 CAPSULE ORAL 2 TIMES DAILY PRN
Status: DISCONTINUED | OUTPATIENT
Start: 2024-02-09 | End: 2024-02-15 | Stop reason: HOSPADM

## 2024-02-09 RX ORDER — HEPARIN SODIUM 5000 [USP'U]/ML
5000 INJECTION, SOLUTION INTRAVENOUS; SUBCUTANEOUS EVERY 12 HOURS SCHEDULED
Status: DISCONTINUED | OUTPATIENT
Start: 2024-02-09 | End: 2024-02-09

## 2024-02-09 RX ORDER — DEXTROSE MONOHYDRATE 25 G/50ML
25 INJECTION, SOLUTION INTRAVENOUS
Status: DISCONTINUED | OUTPATIENT
Start: 2024-02-09 | End: 2024-02-15 | Stop reason: HOSPADM

## 2024-02-09 RX ORDER — NITROGLYCERIN 0.4 MG/1
0.4 TABLET SUBLINGUAL
Status: DISCONTINUED | OUTPATIENT
Start: 2024-02-09 | End: 2024-02-09

## 2024-02-09 RX ORDER — CETIRIZINE HYDROCHLORIDE 10 MG/1
10 TABLET ORAL DAILY
Status: DISCONTINUED | OUTPATIENT
Start: 2024-02-09 | End: 2024-02-15 | Stop reason: HOSPADM

## 2024-02-09 RX ORDER — MAGNESIUM SULFATE HEPTAHYDRATE 40 MG/ML
4 INJECTION, SOLUTION INTRAVENOUS ONCE
Status: COMPLETED | OUTPATIENT
Start: 2024-02-09 | End: 2024-02-09

## 2024-02-09 RX ORDER — POLYETHYLENE GLYCOL 3350 17 G/17G
17 POWDER, FOR SOLUTION ORAL DAILY PRN
Status: DISCONTINUED | OUTPATIENT
Start: 2024-02-09 | End: 2024-02-15 | Stop reason: HOSPADM

## 2024-02-09 RX ORDER — OSELTAMIVIR PHOSPHATE 75 MG/1
75 CAPSULE ORAL EVERY 12 HOURS SCHEDULED
Status: COMPLETED | OUTPATIENT
Start: 2024-02-09 | End: 2024-02-13

## 2024-02-09 RX ORDER — RIVAROXABAN 20 MG/1
1 TABLET, FILM COATED ORAL DAILY
COMMUNITY

## 2024-02-09 RX ORDER — SOTALOL HYDROCHLORIDE 80 MG/1
80 TABLET ORAL EVERY 12 HOURS SCHEDULED
Status: DISCONTINUED | OUTPATIENT
Start: 2024-02-09 | End: 2024-02-15 | Stop reason: HOSPADM

## 2024-02-09 RX ORDER — NITROGLYCERIN 0.4 MG/1
0.4 TABLET SUBLINGUAL
Status: DISCONTINUED | OUTPATIENT
Start: 2024-02-09 | End: 2024-02-15 | Stop reason: HOSPADM

## 2024-02-09 RX ORDER — ALBUTEROL SULFATE 2.5 MG/3ML
2.5 SOLUTION RESPIRATORY (INHALATION) EVERY 4 HOURS PRN
Status: ON HOLD | COMMUNITY
End: 2024-02-09

## 2024-02-09 RX ORDER — DILTIAZEM HYDROCHLORIDE 5 MG/ML
35 INJECTION INTRAVENOUS ONCE
Status: COMPLETED | OUTPATIENT
Start: 2024-02-09 | End: 2024-02-09

## 2024-02-09 RX ORDER — DILTIAZEM HCL IN NACL,ISO-OSM 125 MG/125
5-15 PLASTIC BAG, INJECTION (ML) INTRAVENOUS
Status: DISCONTINUED | OUTPATIENT
Start: 2024-02-09 | End: 2024-02-15 | Stop reason: HOSPADM

## 2024-02-09 RX ORDER — LORATADINE 10 MG/1
1 TABLET ORAL DAILY
COMMUNITY
Start: 2024-02-08

## 2024-02-09 RX ORDER — SODIUM CHLORIDE 9 MG/ML
40 INJECTION, SOLUTION INTRAVENOUS AS NEEDED
Status: DISCONTINUED | OUTPATIENT
Start: 2024-02-09 | End: 2024-02-15 | Stop reason: HOSPADM

## 2024-02-09 RX ORDER — SPIRONOLACTONE 25 MG/1
1 TABLET ORAL DAILY
COMMUNITY

## 2024-02-09 RX ORDER — FUROSEMIDE 10 MG/ML
40 INJECTION INTRAMUSCULAR; INTRAVENOUS
Status: DISCONTINUED | OUTPATIENT
Start: 2024-02-09 | End: 2024-02-11

## 2024-02-09 RX ORDER — NICOTINE POLACRILEX 4 MG
15 LOZENGE BUCCAL
Status: DISCONTINUED | OUTPATIENT
Start: 2024-02-09 | End: 2024-02-15 | Stop reason: HOSPADM

## 2024-02-09 RX ORDER — ALBUTEROL SULFATE 90 UG/1
2 AEROSOL, METERED RESPIRATORY (INHALATION) EVERY 4 HOURS PRN
COMMUNITY
Start: 2024-02-08

## 2024-02-09 RX ORDER — ALBUTEROL SULFATE 2.5 MG/3ML
2.5 SOLUTION RESPIRATORY (INHALATION) EVERY 4 HOURS PRN
Status: DISCONTINUED | OUTPATIENT
Start: 2024-02-09 | End: 2024-02-15 | Stop reason: HOSPADM

## 2024-02-09 RX ORDER — POTASSIUM CHLORIDE 750 MG/1
40 CAPSULE, EXTENDED RELEASE ORAL ONCE
Status: COMPLETED | OUTPATIENT
Start: 2024-02-09 | End: 2024-02-09

## 2024-02-09 RX ORDER — IBUPROFEN 600 MG/1
1 TABLET ORAL
Status: DISCONTINUED | OUTPATIENT
Start: 2024-02-09 | End: 2024-02-15 | Stop reason: HOSPADM

## 2024-02-09 RX ORDER — CARVEDILOL 25 MG/1
25 TABLET ORAL 2 TIMES DAILY WITH MEALS
Status: DISCONTINUED | OUTPATIENT
Start: 2024-02-09 | End: 2024-02-15 | Stop reason: HOSPADM

## 2024-02-09 RX ORDER — ESCITALOPRAM OXALATE 10 MG/1
20 TABLET ORAL DAILY
Status: DISCONTINUED | OUTPATIENT
Start: 2024-02-09 | End: 2024-02-15 | Stop reason: HOSPADM

## 2024-02-09 RX ORDER — BISACODYL 10 MG
10 SUPPOSITORY, RECTAL RECTAL DAILY PRN
Status: DISCONTINUED | OUTPATIENT
Start: 2024-02-09 | End: 2024-02-15 | Stop reason: HOSPADM

## 2024-02-09 RX ORDER — INSULIN LISPRO 100 [IU]/ML
2-7 INJECTION, SOLUTION INTRAVENOUS; SUBCUTANEOUS
Status: DISCONTINUED | OUTPATIENT
Start: 2024-02-09 | End: 2024-02-15 | Stop reason: HOSPADM

## 2024-02-09 RX ORDER — ROSUVASTATIN CALCIUM 20 MG/1
1 TABLET, COATED ORAL
COMMUNITY

## 2024-02-09 RX ORDER — ESCITALOPRAM OXALATE 20 MG/1
20 TABLET ORAL DAILY
COMMUNITY

## 2024-02-09 RX ORDER — FUROSEMIDE 10 MG/ML
40 INJECTION INTRAMUSCULAR; INTRAVENOUS EVERY 12 HOURS SCHEDULED
Status: DISCONTINUED | OUTPATIENT
Start: 2024-02-09 | End: 2024-02-09

## 2024-02-09 RX ORDER — FUROSEMIDE 40 MG/1
1 TABLET ORAL DAILY
COMMUNITY

## 2024-02-09 RX ORDER — CARVEDILOL 25 MG/1
25 TABLET ORAL 2 TIMES DAILY WITH MEALS
Status: DISCONTINUED | OUTPATIENT
Start: 2024-02-09 | End: 2024-02-09

## 2024-02-09 RX ORDER — SODIUM CHLORIDE 0.9 % (FLUSH) 0.9 %
10 SYRINGE (ML) INJECTION AS NEEDED
Status: DISCONTINUED | OUTPATIENT
Start: 2024-02-09 | End: 2024-02-15 | Stop reason: HOSPADM

## 2024-02-09 RX ADMIN — CETIRIZINE HYDROCHLORIDE 10 MG: 10 TABLET, FILM COATED ORAL at 09:59

## 2024-02-09 RX ADMIN — POTASSIUM CHLORIDE 40 MEQ: 10 CAPSULE, COATED, EXTENDED RELEASE ORAL at 18:08

## 2024-02-09 RX ADMIN — OSELTAMIVIR PHOSPHATE 75 MG: 75 CAPSULE ORAL at 21:57

## 2024-02-09 RX ADMIN — SOTALOL HYDROCHLORIDE 80 MG: 80 TABLET ORAL at 10:00

## 2024-02-09 RX ADMIN — DILTIAZEM HYDROCHLORIDE 35 MG: 5 INJECTION, SOLUTION INTRAVENOUS at 01:49

## 2024-02-09 RX ADMIN — OSELTAMIVIR PHOSPHATE 75 MG: 75 CAPSULE ORAL at 10:00

## 2024-02-09 RX ADMIN — ESCITALOPRAM OXALATE 20 MG: 10 TABLET ORAL at 09:58

## 2024-02-09 RX ADMIN — CARVEDILOL 25 MG: 25 TABLET, FILM COATED ORAL at 09:59

## 2024-02-09 RX ADMIN — INSULIN LISPRO 2 UNITS: 100 INJECTION, SOLUTION INTRAVENOUS; SUBCUTANEOUS at 22:10

## 2024-02-09 RX ADMIN — ASPIRIN 81 MG: 81 TABLET, COATED ORAL at 09:58

## 2024-02-09 RX ADMIN — ROSUVASTATIN CALCIUM 20 MG: 20 TABLET, FILM COATED ORAL at 21:57

## 2024-02-09 RX ADMIN — FUROSEMIDE 40 MG: 10 INJECTION, SOLUTION INTRAMUSCULAR; INTRAVENOUS at 18:10

## 2024-02-09 RX ADMIN — INSULIN LISPRO 5 UNITS: 100 INJECTION, SOLUTION INTRAVENOUS; SUBCUTANEOUS at 09:59

## 2024-02-09 RX ADMIN — SOTALOL HYDROCHLORIDE 80 MG: 80 TABLET ORAL at 21:57

## 2024-02-09 RX ADMIN — INSULIN LISPRO 6 UNITS: 100 INJECTION, SOLUTION INTRAVENOUS; SUBCUTANEOUS at 12:22

## 2024-02-09 RX ADMIN — SULFUR HEXAFLUORIDE 3 ML: KIT at 09:28

## 2024-02-09 RX ADMIN — DILTIAZEM HYDROCHLORIDE 5 MG/HR: 5 INJECTION INTRAVENOUS at 00:50

## 2024-02-09 RX ADMIN — INSULIN DETEMIR 10 UNITS: 100 INJECTION, SOLUTION SUBCUTANEOUS at 18:10

## 2024-02-09 RX ADMIN — INSULIN LISPRO 2 UNITS: 100 INJECTION, SOLUTION INTRAVENOUS; SUBCUTANEOUS at 18:08

## 2024-02-09 RX ADMIN — Medication 10 ML: at 10:18

## 2024-02-09 RX ADMIN — LEVOTHYROXINE SODIUM 25 MCG: 25 TABLET ORAL at 10:00

## 2024-02-09 RX ADMIN — RIVAROXABAN 20 MG: 20 TABLET, FILM COATED ORAL at 10:00

## 2024-02-09 RX ADMIN — SODIUM CHLORIDE 500 ML: 9 INJECTION, SOLUTION INTRAVENOUS at 00:52

## 2024-02-09 RX ADMIN — MAGNESIUM SULFATE IN WATER FOR 4 G: 40 INJECTION INTRAVENOUS at 18:20

## 2024-02-09 RX ADMIN — FUROSEMIDE 40 MG: 10 INJECTION, SOLUTION INTRAMUSCULAR; INTRAVENOUS at 03:08

## 2024-02-09 RX ADMIN — FUROSEMIDE 40 MG: 10 INJECTION, SOLUTION INTRAMUSCULAR; INTRAVENOUS at 09:58

## 2024-02-09 RX ADMIN — POTASSIUM CHLORIDE 40 MEQ: 10 CAPSULE, COATED, EXTENDED RELEASE ORAL at 09:58

## 2024-02-09 RX ADMIN — CARVEDILOL 25 MG: 25 TABLET, FILM COATED ORAL at 18:08

## 2024-02-09 NOTE — PLAN OF CARE
Goal Outcome Evaluation:         No admit. No complaints of pain. Pt refuses to take off jeans and boots. Family at bedside. Calvin Meza RN

## 2024-02-09 NOTE — H&P
[facility]    Patient Identification:  Name:  Fantasma Mcintosh  Age:  59 y.o.  Sex:  male  :  1965  MRN:  [id number]   Visit Number:  [id number]  Admit Date: 2024   Room number:  36/36  Primary Care Physician:  Provider, No Known    Date of Admission: 2024     Subjective     Chief complaint:    Chief Complaint   Patient presents with    Edema    Irregular Heart Beat       History of presenting illness:     This is a 59-year-old male with a past medical history of hypertension, anxiety, hypothyroidism, hyperlipidemia, diabetes mellitus and atrial fibrillation presenting with lower extremity edema. Pt states that he has been experiencing worsening lower extremity swelling and abdominal distention since January despite being compliant with his diuretics. He was seen by his PCP today and recommended that he be admitted to the hospital in [place]. The patient did not want to be admitted there and came to our hospital. He is also complaining of mild shortness of breath and sensation of palpitations. He does not weigh himself and does not know if he has gained any weight recently.      In ED, blood pressure 121/82, heart rate 146, respiratory rate 18, temperature 98.5 and O2 saturation 92% on 2 L nasal cannula.  Labs on arrival showed a sodium 131, potassium 3.8, BUN 22, creatinine 1.47, proBNP 4200, WBC 7.9, hemoglobin 13.1 and platelet count 85.  The patient was found to be positive for influenza A.  Chest x-ray showed no acute infiltrates.    ---------------------------------------------------------------------------------------------------------------------   Review of Systems   Constitutional:  Positive for fatigue.   HENT:  Negative for dental problem and hearing loss.    Eyes:  Negative for pain.   Respiratory:  Positive for shortness of breath.    Cardiovascular:  Positive for palpitations and leg swelling.   Gastrointestinal:  Positive for abdominal distention.    Endocrine: Negative for polydipsia.   Genitourinary:  Negative for dysuria.   Musculoskeletal:  Negative for back pain.   Allergic/Immunologic: Negative for food allergies.   Neurological:  Negative for light-headedness.   Psychiatric/Behavioral:  Negative for behavioral problems.      ---------------------------------------------------------------------------------------------------------------------   Past Medical History:   Diagnosis Date    Diabetes mellitus     Disease of thyroid gland      History reviewed. No pertinent surgical history.  History reviewed. No pertinent family history.  Social History     Socioeconomic History    Marital status:    Tobacco Use    Smoking status: Every Day     Packs/day: 1.00     Years: 15.00     Additional pack years: 0.00     Total pack years: 15.00     Types: Cigarettes    Smokeless tobacco: Never   Vaping Use    Vaping Use: Never used   Substance and Sexual Activity    Alcohol use: Not Currently    Drug use: Not Currently     ---------------------------------------------------------------------------------------------------------------------   Allergies:  Penicillins  ---------------------------------------------------------------------------------------------------------------------   Medications below are reported home medications pulling from within the system; at this time, these medications have not been reconciled unless otherwise specified and are in the verification process for further verifcation as current home medications.      Prior to Admission Medications       Prescriptions Last Dose Informant Patient Reported? Taking?    loratadine (CLARITIN) 10 MG tablet   Yes Yes    Take 1 tablet by mouth Daily.    albuterol (PROVENTIL) (2.5 MG/3ML) 0.083% nebulizer solution   Yes No    Take 2.5 mg by nebulization Every 4 (Four) Hours As Needed for Wheezing.    aspirin 81 MG EC tablet   Yes No    Daily.    carvedilol (COREG) 25 MG tablet   Yes No    cetirizine  (zyrTEC) 10 MG tablet   Yes No    cetirizine 10 mg tablet    Cholecalciferol 25 MCG (1000 UT) tablet   Yes No    cholecalciferol (vitamin D3) 25 mcg (1,000 unit) tablet    escitalopram (LEXAPRO) 20 MG tablet   Yes No    Take 1 tablet by mouth Daily.    furosemide (LASIX) 40 MG tablet   Yes No    Take 1 tablet by mouth Daily.    levothyroxine (SYNTHROID, LEVOTHROID) 25 MCG tablet   Yes No    Take 1 tablet by mouth Daily.    Omega-3 Fatty Acids (fish oil) 1000 MG capsule capsule   Yes No    Every 12 (Twelve) Hours.    rosuvastatin (CRESTOR) 20 MG tablet   Yes No    Take 1 tablet by mouth every night at bedtime.    spironolactone (ALDACTONE) 25 MG tablet   Yes No    Take 1 tablet by mouth Daily.    Xarelto 20 MG tablet   Yes No    Take 1 tablet by mouth Daily.    Xigduo XR 5-1000 MG tablet   Yes No          Objective     Vital Signs:  Temp:  [98 °F (36.7 °C)-98.5 °F (36.9 °C)] 98.5 °F (36.9 °C)  Heart Rate:  [] 86  Resp:  [18] 18  BP: ()/(55-94) 86/64    Mean Arterial Pressure (Non-Invasive) for the past 24 hrs (Last 3 readings):   Noninvasive MAP (mmHg)   02/09/24 0220 70     SpO2:  [86 %-95 %] 90 %  on  Flow (L/min):  [2] 2;   Device (Oxygen Therapy): nasal cannula  Body mass index is 29.89 kg/m².    Wt Readings from Last 3 Encounters:   02/08/24 111 kg (245 lb 9.5 oz)   05/06/23 107 kg (235 lb 10.8 oz)   04/28/23 113 kg (250 lb 3.6 oz)      ----------------------------------------------------------------------------------------------------------------------  PHYSICAL EXAMINATION:  GENERAL: The patient is well developed and nontoxic.  HEENT: Nonicteric sclerae, PERRLA, EOMI. Oropharynx clear. Moist mucous membranes. Conjunctivae appear well perfused.  HEART: 2+ pitting edema  LUNGS: Decreased breath sounds bilaterally   ABDOMEN: Soft, positive bowel sounds, nontender, no organomegaly.  SKIN: No rash, no excessive bruising, petechiae, or purpura.  NEUROLOGIC: Cranial nerves II-XII intact without  "motor/sensory deficit.    ---------------------------------------------------------------------------------------------------------------------  --------------------------------------------------------------------------------------------------------------------  LABS:    CBC and coagulation:  Results from last 7 days   Lab Units 02/09/24  0042   WBC 10*3/mm3 7.97   HEMOGLOBIN g/dL 13.1   HEMATOCRIT % 40.0   MCV fL 82.5   MCHC g/dL 32.8   PLATELETS 10*3/mm3 85*     Acid/base balance:      Renal and electrolytes:  Results from last 7 days   Lab Units 02/09/24  0042   SODIUM mmol/L 131*   POTASSIUM mmol/L 3.8   CHLORIDE mmol/L 89*   CO2 mmol/L 28.6   BUN mg/dL 22*   CREATININE mg/dL 1.47*   CALCIUM mg/dL 8.5*   GLUCOSE mg/dL 374*     Estimated Creatinine Clearance: 74.8 mL/min (A) (by C-G formula based on SCr of 1.47 mg/dL (H)).    Liver and pancreatic function:  Results from last 7 days   Lab Units 02/09/24  0042   ALBUMIN g/dL 3.5   BILIRUBIN mg/dL 1.2   ALK PHOS U/L 63   AST (SGOT) U/L 18   ALT (SGPT) U/L 7   AMMONIA umol/L 24     Endocrine function:  No results found for: \"HGBA1C\"  Point of care bedside glucose levels:      Lab Results   Component Value Date    TSH 0.936 06/06/2023    FREET4 1.18 06/06/2023     Cardiac:  Results from last 7 days   Lab Units 02/09/24  0042   HSTROP T ng/L 27*   PROBNP pg/mL 4,238.0*       Cultures:  No results found for: \"COLORU\", \"CLARITYU\", \"SPECGRAV\", \"PHUR\", \"PROTEINUR\", \"GLUCOSEU\", \"KETONESU\", \"BLOODU\", \"NITRITEU\", \"LEUKOCYTESUR\", \"BILIRUBINUR\", \"UROBILINOGEN\", \"RBCUA\", \"WBCUA\", \"BACTERIA\", \"UACOMMENT\"  Microbiology Results (last 10 days)       Procedure Component Value - Date/Time    COVID-19, FLU A/B, RSV PCR 1 HR TAT - Swab, Nasopharynx [[id number]]  (Abnormal) Collected: 02/08/24 2332    Lab Status: Final result Specimen: Swab from Nasopharynx Updated: 02/09/24 0047     COVID19 Not Detected     Influenza A PCR Detected     Influenza B PCR Not Detected     RSV, PCR Not " "Detected    Narrative:      Fact sheet for providers: https://www.fda.gov/media/443694/download    Fact sheet for patients: https://www.fda.gov/media/016707/download    Test performed by PCR.            No results found for: \"PREGTESTUR\", \"PREGSERUM\", \"HCG\", \"HCGQUANT\"  Pain Management Panel           No data to display                I have personally looked at the labs and they are summarized above.  ----------------------------------------------------------------------------------------------------------------------  Detailed radiology reports for the last 24 hours:    Imaging Results (Last 24 Hours)       Procedure Component Value Units Date/Time    XR Chest 1 View [[id number]] Collected: 02/09/24 0059     Updated: 02/09/24 0102    Narrative:      PROCEDURE: XR CHEST 1 VW     COMPARISON: 5/4/2017.     INDICATIONS: SOA/SOB/shortness of air/shortness of breath.     FINDINGS:  A single AP (or PA) upright portable chest radiograph was performed.  Mild-to-moderate   cardiac enlargement is seen.  No acute infiltrate is appreciated.  No pleural effusion or   pneumothorax is identified.  Chronic calcified granulomatous disease may involve the chest.  There   are degenerative changes of the shoulders, especially the left acromioclavicular (AC) joint.  There   is mild distension of the stomach.  No significant interval change is seen since the prior study   (or studies).       Impression:        No acute infiltrate is appreciated.              Please note that portions of this note were completed with a voice recognition program.     ROXY AMBROSIO JR, MD         Electronically Signed and Approved By: ROXY AMBROSIO JR, MD on 2/09/2024 at 0:59                              Final impressions for the last 30 days of radiology reports:    XR Chest 1 View    Result Date: 2/9/2024    No acute infiltrate is appreciated.     Please note that portions of this note were completed with a voice recognition program.  ROXY AMBROSIO JR" MD       Electronically Signed and Approved By: ROXY AMBROSIO JR, MD on 2/09/2024 at 0:59                 Assessment & Plan     This is a 59-year-old male with a past medical history of hypertension, anxiety, hypothyroidism, hyperlipidemia, diabetes mellitus and atrial fibrillation presenting with lower extremity edema.     Assessment:  A-fib with RVR  Influenza A  HUMBERTO  Hyponatremia  Hypertension  Anxiety  Hypothyroidism  Hyperlipidemia  Diabetes mellitus    Plan:  -Admit to inpatient  -Continue Cardizem drip  -Monitor on telemetry  -Follow-up echo  -Trend troponins  -Consult cardiology  -HUMBERTO possibly secondary to volume overload  -Start diuretics  -Avoid nephrotoxins  -Strict ins and outs  -Monitor Daily weights  -F/u CK level  -Will also order a Renal US  -Patient blood glucose goal of 140-180  -Start insulin sliding scale  -Carbohydrate controlled diet      Marco Mcdonald MD  [facility] Centerist  02/09/24  02:23 EST

## 2024-02-09 NOTE — CONSULTS
Cardiology Consult Note  [facility] 5TH FLOOR SURGICAL TELEMETRY UNIT          Patient Identification:  Fantasma Mcintosh      [id number]  59 y.o.        male  1965       Date of Consultation: 2/9/2024    Reason for Consultation: Atrial fibrillation with rapid rates    PCP: Provider, No Known  Primary cardiologist: None    History of Present Illness:     59-year-old white male.  He does have a known history of paroxysmal atrial fibrillation over many years.  He used to be on amiodarone but that was stopped because of thyroid problems.  He has undergone cardioversion several times in the past several years ago.  He has had increased shortness of breath over the last several days, he was advised by his primary care to be admitted to his local hospital but the patient wanted to come to [place].  He was found to be in atrial fibrillation with rapid rates, as well as testing positive for influenza A.  He was admitted for further management.  He is already on Xarelto at home, as well as a beta-blocker.  He was started on a Cardizem drip here.  Although he is less short of breath currently his heart rates are still in the 140s.  He denies chest pain.  No coronary history.  He is a smoker and does not seem to be interested in quitting.    Past History:  Past Medical History:   Diagnosis Date    Diabetes mellitus     Disease of thyroid gland      History reviewed. No pertinent surgical history.  Allergies   Allergen Reactions    Penicillins Itching     Social History     Socioeconomic History    Marital status:    Tobacco Use    Smoking status: Every Day     Packs/day: 1.00     Years: 15.00     Additional pack years: 0.00     Total pack years: 15.00     Types: Cigarettes    Smokeless tobacco: Never   Vaping Use    Vaping Use: Never used   Substance and Sexual Activity    Alcohol use: Not Currently    Drug use: Not Currently     History reviewed. No pertinent family history.  Medications:  Medications  Prior to Admission   Medication Sig Dispense Refill Last Dose    albuterol sulfate  (90 Base) MCG/ACT inhaler Inhale 2 puffs Every 4 (Four) Hours As Needed.       cefdinir (OMNICEF) 300 MG capsule Take 1 capsule by mouth 2 (Two) Times a Day.       loratadine (CLARITIN) 10 MG tablet Take 1 tablet by mouth Daily.       albuterol (PROVENTIL) (2.5 MG/3ML) 0.083% nebulizer solution Take 2.5 mg by nebulization Every 4 (Four) Hours As Needed for Wheezing.       aspirin 81 MG EC tablet Daily.       carvedilol (COREG) 25 MG tablet        cetirizine (zyrTEC) 10 MG tablet cetirizine 10 mg tablet       Cholecalciferol 25 MCG (1000 UT) tablet cholecalciferol (vitamin D3) 25 mcg (1,000 unit) tablet       escitalopram (LEXAPRO) 20 MG tablet Take 1 tablet by mouth Daily.       furosemide (LASIX) 40 MG tablet Take 1 tablet by mouth Daily.       levothyroxine (SYNTHROID, LEVOTHROID) 25 MCG tablet Take 1 tablet by mouth Daily.       Omega-3 Fatty Acids (fish oil) 1000 MG capsule capsule Every 12 (Twelve) Hours.       rosuvastatin (CRESTOR) 20 MG tablet Take 1 tablet by mouth every night at bedtime.       spironolactone (ALDACTONE) 25 MG tablet Take 1 tablet by mouth Daily.       Xarelto 20 MG tablet Take 1 tablet by mouth Daily.        Current medications:  aspirin, 81 mg, Oral, Daily  carvedilol, 25 mg, Oral, BID With Meals  cetirizine, 10 mg, Oral, Daily  escitalopram, 20 mg, Oral, Daily  furosemide, 40 mg, Intravenous, Q12H  insulin lispro, 2-7 Units, Subcutaneous, 4x Daily AC & at Bedtime  levothyroxine, 25 mcg, Oral, Q AM  oseltamivir, 75 mg, Oral, Q12H  potassium chloride, 40 mEq, Oral, Once  rivaroxaban, 20 mg, Oral, Daily  rosuvastatin, 20 mg, Oral, Nightly  sodium chloride, 1,000 mL, Intravenous, Once  sodium chloride, 10 mL, Intravenous, Q12H      Current IV drips:  dilTIAZem, 5-15 mg/hr, Last Rate: Stopped (02/09/24 0158)        Review of Systems   Constitutional: Positive for malaise/fatigue.   HENT: Negative.    "  Eyes: Negative.    Cardiovascular:  Positive for irregular heartbeat and palpitations. Negative for chest pain.   Respiratory:  Positive for shortness of breath.    Endocrine: Negative.    Hematologic/Lymphatic: Negative.  Does not bruise/bleed easily.   Skin: Negative.    Musculoskeletal: Negative.    Gastrointestinal: Negative.    Genitourinary: Negative.    Neurological: Negative.    Psychiatric/Behavioral: Negative.           Physical exam:    /78 (BP Location: Left arm, Patient Position: Sitting)   Pulse (!) 136   Temp 99.7 °F (37.6 °C) (Oral)   Resp 20   Ht 193 cm (75.98\")   Wt 111 kg (245 lb 2.4 oz)   SpO2 93%   BMI 29.85 kg/m²  Body mass index is 29.85 kg/m².    SpO2  Min: 86 %  Max: 95 %    General Appearance:   well developed  well nourished  HENT:   oropharynx moist  lips not cyanotic  Neck:  thyroid not enlarged  supple  Respiratory:  no respiratory distress  Scant rhonchi  no rales  Cardiovascular:  no jugular venous distention  Rapid irregular rate  apical impulse normal  S1 normal, S2 normal  no S3, no S4   no murmur  no rub, no thrill  carotid pulses normal; no bruit  pedal pulses normal  lower extremity edema: none    Gastrointestinal:   bowel sounds normal  non-tender  no hepatomegaly, no splenomegaly  Musculoskeletal:  no clubbing of fingers.   normocephalic, head atraumatic  Skin:   warm, dry  Neuro/Psychiatric:  judgement and insight appropriate  normal mood and affect    Cardiographics:     ECG  (personally reviewed) atrial fibrillation, rapid ventricular rate, nonspecific ST changes   Telemetry:  (personally reviewed)    ECHO: Pending   CATH:     CARDIOLITE: Negative for ischemia 2020    Lab Review:       Results from last 7 days   Lab Units 02/09/24  0641 02/09/24  0042   WBC 10*3/mm3 7.21 7.97   HEMOGLOBIN g/dL 12.2* 13.1   HEMATOCRIT % 36.9* 40.0            Results from last 7 days   Lab Units 02/09/24  0641 02/09/24  0042   SODIUM mmol/L 131* 131*   BUN mg/dL 19 22* " "  CREATININE mg/dL 1.26 1.47*   GLUCOSE mg/dL 369* 374*      Estimated Creatinine Clearance: 87.2 mL/min (by C-G formula based on SCr of 1.26 mg/dL).         Invalid input(s): \"LDLCALC\"     Results from last 7 days   Lab Units 02/09/24  0215   INR  2.24*        Lab Results   Component Value Date    TSH 0.936 06/06/2023      No results found for: \"HGBA1C\"        No results found for: \"DIGOXIN\"   No components found for: \"DDIMERQUAN\"     Imaging:   XR Chest 1 View    Result Date: 2/9/2024  PROCEDURE: XR CHEST 1 VW  COMPARISON: 5/4/2017.  INDICATIONS: SOA/SOB/shortness of air/shortness of breath.  FINDINGS:  A single AP (or PA) upright portable chest radiograph was performed.  Mild-to-moderate cardiac enlargement is seen.  No acute infiltrate is appreciated.  No pleural effusion or pneumothorax is identified.  Chronic calcified granulomatous disease may involve the chest.  There are degenerative changes of the shoulders, especially the left acromioclavicular (AC) joint.  There is mild distension of the stomach.  No significant interval change is seen since the prior study (or studies).      Impression:   No acute infiltrate is appreciated.     Please note that portions of this note were completed with a voice recognition program.  ROXY AMBROSIO JR, MD       Electronically Signed and Approved By: ROXY AMBROSIO JR, MD on 2/09/2024 at 0:59                 The ASCVD Risk score (Emmett DK, et al., 2019) failed to calculate for the following reasons:    Cannot find a previous HDL lab    Cannot find a previous total cholesterol lab      Assessment:      Atrial fibrillation with RVR    Influenza A      Initial cardiac assessment: 59-year-old white male presents with increased shortness of breath and respiratory symptoms as well as palpitations for several days.  He has been diagnosed with influenza A and is in atrial fibrillation with rapid ventricular rates.  He does have a history of atrial fibrillation, having undergone " cardioversions a few times in the past.  He had previously failed amiodarone therapy due to thyroid problems.      Recommendations:  1.  Continue beta-blocker and Xarelto  2.  Start sotalol initiation, monitor EKG QTc  3.  Depending on his course he may require cardioversion prior to discharge to regain sinus rhythm.  We will follow  4.  Management of influenza A per primary service                Santos Andersen MD  2/9/2024    08:08 EST

## 2024-02-09 NOTE — PROGRESS NOTES
[facility]   Hospitalist Progress Note  Date: 2024  Patient Name: Fantasma Mcintosh  : 1965  MRN: [id number]  Date of admission: 2024  Consultants:   -Cardiology: Dr. Grey Andersen    Subjective   Subjective     Chief Complaint: Edema, palpitations    Summary:   Fantasma Mcintosh is a 59 y.o. male with essential pretension, anxiety, hypothyroidism, hyperlipidemia, chronic paroxysmal atrial fibrillation that presented with lower extremity edema and palpitations.  Eval in ED significant for patient being in A-fib with RVR and labs showing elevated proBNP and elevated creatinine.  Testing did show patient to be positive for influenza A.  Hospitalist service contacted for further evaluation management.  Treatment for influenza A with Tamiflu started.  Cardiology consulted.  Patient started on diltiazem drip.  Patient started on sotalol and home beta-blocker therapy per cardiology recommendation.    Interval Followup:   No acute events overnight.  Patient denies any shortness of breath.  Continues on diltiazem drip.  Patient denies any active chest pain.  Nursing with no additional acute issues to report.    Objective   Objective     Vitals:   Temp:  [98 °F (36.7 °C)-99.7 °F (37.6 °C)] 99.3 °F (37.4 °C)  Heart Rate:  [] 136  Resp:  [18-24] 24  BP: ()/(55-94) 91/73  Flow (L/min):  [2-4] 4  Physical Exam   Gen: No acute distress, Conversant, Pleasant, lying in bed  Resp: CTAB, minimal rhonchi noted, equal chest rise bilaterally, normal respiratory effort  Card: IRIR, No m/r/g  Abd: Soft, Nontender, Nondistended, + bowel sounds    Result Review    Result Review:  I have personally reviewed the results as below and agree with these findings:  []  Laboratory:   CMP          2023    15:41 2023    22:08 2024    00:42 2024    06:41   CMP   Glucose 67  147  374  369    BUN 21  18  22  19    Creatinine 0.75  0.80  1.47  1.26    EGFR 105.3  103.2  54.9  66.1    Sodium 139  138  131  131     Potassium 3.8  4.0  3.8  3.3    Chloride 99  101  89  88    Calcium 9.3  9.2  8.5  8.3    Total Protein 8.2  7.4  6.8     Albumin 4.4  3.8  3.5     Globulin 3.8  3.6  3.3     Total Bilirubin 0.5  0.3  1.2     Alkaline Phosphatase 74  98  63     AST (SGOT) 15  28  18     ALT (SGPT) 15  26  7     Albumin/Globulin Ratio 1.2  1.1  1.1     BUN/Creatinine Ratio 28.0  22.5  15.0  15.1    Anion Gap 12.8  11.2  13.4  14.4      CBC          5/6/2023    15:41 6/6/2023    22:08 2/9/2024    00:42 2/9/2024    06:41   CBC   WBC 9.25  10.82  7.97  7.21    RBC 5.34  5.32  4.85  4.44    Hemoglobin 15.1  15.1  13.1  12.2    Hematocrit 44.2  44.2  40.0  36.9    MCV 82.8  83.1  82.5  83.1    MCH 28.3  28.4  27.0  27.5    MCHC 34.2  34.2  32.8  33.1    RDW 12.8  12.9  13.4  13.4    Platelets 222  196  85  75    Magnesium low.  Hyperglycemia noted.  []  Microbiology:   []  Radiology:   [x]  EKG/Telemetry:    []  Cardiology/Vascular:    []  Pathology:  []  Old records:  []  Other:    Assessment & Plan   Assessment / Plan     Assessment:  Chronic paroxysmal atrial fibrillation acutely with RVR  Influenza A infection  Acute renal failure (baseline creatinine appears to be 0.7-0.8 and creatinine on admission 1.47)  Hypothyroidism  Hypokalemia, new  Hypomagnesemia, new  Essential hypertension  Anxiety  Hyperlipidemia  Hyponatremia, not clinically significant    Plan:  -Cardiology consulted and following, appreciate assistance and recommendations in the care of this patient.  -Continue diltiazem drip, wean as tolerated  -Start home carvedilol and Xarelto  -Management discussed with cardiology.  Agree with starting home carvedilol and Xarelto.  Cardiology also will be starting patient on sotalol.  Depending on patient's course he may require cardioversion prior to discharge.  -Echocardiogram ordered, follow-up results  -Start Tamiflu for influenza A  -Replace potassium and magnesium  -Patient with significant hyperglycemia.  Start Levemir.   Continue SSI.  Monitor blood glucose level and SSI carb and titrate send regimen accordingly.  Hemoglobin A1c ordered.  Follow-up results.  -Lasix 40 mg IV twice daily.  Monitor electrolytes and renal function closely given IV diuresis  -Start appropriate home medications  -Will monitor electrolytes and renal function with BMP and magnesium level in the AM  -Will monitor WBC and Hgb with CBC in the AM  -Clinical course will dictate further management     DVT Prophylaxis: Xarelto  Diet: Diabetic  Dispo: Home when medically appropriate for discharge  Code Status: Full code     Personally reviewed patients labs and imaging, discussed with patient and nurse at bedside. Discussed management with the following consultants: Cardiology.     Part of this note may be an electronic transcription/translation of spoken language to printed text using the Dragon dictation system.    DVT prophylaxis:  Medical DVT prophylaxis orders are present.      CODE STATUS:   Code Status (Patient has no pulse and is not breathing): CPR (Attempt to Resuscitate)  Medical Interventions (Patient has pulse or is breathing): Full Support      Electronically signed by Kaleb Pérez MD, 02/09/24, 3:25 PM EST.

## 2024-02-09 NOTE — ED PROVIDER NOTES
Time: 11:28 PM EST  Date of encounter:  2/8/2024  Independent Historian/Clinical History and Information was obtained by:   Patient and Family    History is limited by: N/A    Chief Complaint   Patient presents with    Edema    Irregular Heart Beat         History of Present Illness:  Patient is a 59 y.o. year old male who presents to the emergency department for evaluation of abdominal distention and swelling along with lower extremity swelling since January.     Patient family state the patient's had increased water retention since January.  He was seen by his PCP today and recommended that he be admitted to the hospital in [place] but the patient did not want to be admitted there and came to our hospital.  He states he has a sensation of palpitations.  Mild shortness of breath.  States he has pedal edema but believes that is not improving.  Family states that his oxygen level was low earlier but it improved and he was ultimately discharged.  He states has been compliant with his medications.  He does not weigh himself and does not know if he has gained any weight recently.  He denies any chest pain currently.    Patient Care Team  Primary Care Provider: Provider, No Known    Past Medical History:     Allergies   Allergen Reactions    Penicillins Itching     Past Medical History:   Diagnosis Date    Diabetes mellitus     Disease of thyroid gland      History reviewed. No pertinent surgical history.  History reviewed. No pertinent family history.    Home Medications:  Prior to Admission medications    Medication Sig Start Date End Date Taking? Authorizing Provider   aspirin 81 MG EC tablet Daily.    Keli Hernandez MD   carvedilol (COREG) 25 MG tablet  4/4/23   Keli Hernandez MD   cetirizine (zyrTEC) 10 MG tablet cetirizine 10 mg tablet    Keli Hernandez MD   Cholecalciferol 25 MCG (1000 UT) tablet cholecalciferol (vitamin D3) 25 mcg (1,000 unit) tablet    Keli Hernandez MD  "  levothyroxine (SYNTHROID, LEVOTHROID) 25 MCG tablet Take 1 tablet by mouth Daily.    ProviderKeli MD   Omega-3 Fatty Acids (fish oil) 1000 MG capsule capsule Every 12 (Twelve) Hours.    Keli Hernandez MD   Xigduo XR 5-1000 MG tablet  4/4/23   Keli Hernandez MD        Social History:   Social History     Tobacco Use    Smoking status: Every Day     Packs/day: 1.00     Years: 15.00     Additional pack years: 0.00     Total pack years: 15.00     Types: Cigarettes    Smokeless tobacco: Never   Vaping Use    Vaping Use: Never used   Substance Use Topics    Alcohol use: Not Currently    Drug use: Not Currently         Review of Systems:  Review of Systems   Constitutional:  Positive for chills. Negative for fever.   HENT:  Negative for congestion, ear pain and sore throat.    Eyes:  Negative for pain.   Respiratory:  Positive for cough and shortness of breath. Negative for chest tightness.    Cardiovascular:  Positive for leg swelling. Negative for chest pain.   Gastrointestinal:  Positive for abdominal distention. Negative for abdominal pain, diarrhea, nausea and vomiting.   Genitourinary:  Negative for flank pain and hematuria.   Musculoskeletal:  Negative for joint swelling.   Skin:  Negative for pallor.   Neurological:  Negative for seizures and headaches.   All other systems reviewed and are negative.       Physical Exam:  /78 (BP Location: Left arm, Patient Position: Sitting)   Pulse (!) 136   Temp 99.7 °F (37.6 °C) (Oral)   Resp 20   Ht 193 cm (75.98\")   Wt 111 kg (245 lb 2.4 oz)   SpO2 93%   BMI 29.85 kg/m²         Physical Exam  Vitals and nursing note reviewed.   Constitutional:       General: He is not in acute distress.     Appearance: Normal appearance. He is not toxic-appearing.   HENT:      Head: Normocephalic and atraumatic.      Jaw: There is normal jaw occlusion.   Eyes:      General: Lids are normal.      Extraocular Movements: Extraocular movements intact.      " Conjunctiva/sclera: Conjunctivae normal.      Pupils: Pupils are equal, round, and reactive to light.      Comments: Strabismus   Cardiovascular:      Rate and Rhythm: Tachycardia present. Rhythm irregular.      Pulses: Normal pulses.      Heart sounds: Normal heart sounds.   Pulmonary:      Effort: Pulmonary effort is normal. No respiratory distress.      Breath sounds: Normal breath sounds. Rhonchi present. No wheezing.   Abdominal:      General: Abdomen is flat. There is no distension.      Palpations: Abdomen is soft.      Tenderness: There is no abdominal tenderness. There is no guarding or rebound.   Musculoskeletal:         General: Normal range of motion.      Cervical back: Normal range of motion and neck supple.      Right lower leg: Edema present.      Left lower leg: Edema present.   Skin:     General: Skin is warm and dry.      Coloration: Skin is pale. Skin is not cyanotic.   Neurological:      Mental Status: He is alert and oriented to person, place, and time. Mental status is at baseline.   Psychiatric:         Attention and Perception: Attention and perception normal.         Mood and Affect: Mood normal.                Procedures:  Procedures      Medical Decision Making:      Comorbidities that affect care:    Diabetes, thyroid disease, chronic anticoagulation, atrial fibrillation    External Notes reviewed:    Previous Clinic Note: Cardiology visit 6/19/2023 for atrial fibrillation      The following orders were placed and all results were independently analyzed by me:  Orders Placed This Encounter   Procedures    COVID-19, FLU A/B, RSV PCR 1 HR TAT - Swab, Nasopharynx    XR Chest 1 View    US Renal Limited    [place] Draw    Comprehensive Metabolic Panel    BNP    Single High Sensitivity Troponin T    CBC Auto Differential    Ammonia    Protime-INR    Scan Slide    Basic Metabolic Panel    High Sensitivity Troponin T    CK    High Sensitivity Troponin T 2Hr    CBC Auto Differential    Diet:  Diabetic Diets; Consistent Carbohydrate; Texture: Regular Texture (IDDSI 7); Fluid Consistency: Thin (IDDSI 0)    Undress & Gown    Continuous Pulse Oximetry    Vital Signs    Vital Signs    Intake & Output    Weigh Patient    Oral Care    Telemetry - Maintain IV Access    Telemetry - Place Orders & Notify Provider of Results When Patient Experiences Acute Chest Pain, Dysrhythmia or Respiratory Distress    Up With Assistance    Daily Weights    Strict Intake & Output    Hospitalist (on-call MD unless specified)    Inpatient Cardiology Consult    Oxygen Therapy- Nasal Cannula; Titrate 1-6 LPM Per SpO2; 90 - 95%    Oxygen Therapy- Nasal Cannula; Titrate 1-6 LPM Per SpO2; 90 - 95%    POC Glucose 4x Daily Before Meals & at Bedtime    ECG 12 Lead ED Triage Standing Order; SOA    ECG 12 Lead Chest Pain    Adult Transthoracic Echo Complete W/ Cont if Necessary Per Protocol    Insert Peripheral IV    Insert Peripheral IV    Inpatient Admission    CBC & Differential    Green Top (Gel)    Lavender Top    Gold Top - SST    Light Blue Top    CBC & Differential       Medications Given in the Emergency Department:  Medications   sodium chloride 0.9 % flush 10 mL (has no administration in time range)   dilTIAZem (CARDIZEM) 125 mg in 125 mL sodium chloride  infusion (0 mg/hr Intravenous Hold 2/9/24 0158)   sodium chloride 0.9 % flush 10 mL (has no administration in time range)   sodium chloride 0.9 % flush 10 mL (has no administration in time range)   sodium chloride 0.9 % infusion 40 mL (has no administration in time range)   heparin (porcine) 5000 UNIT/ML injection 5,000 Units (has no administration in time range)   nitroglycerin (NITROSTAT) SL tablet 0.4 mg (has no administration in time range)   sennosides-docusate (PERICOLACE) 8.6-50 MG per tablet 2 tablet (has no administration in time range)     And   polyethylene glycol (MIRALAX) packet 17 g (has no administration in time range)     And   bisacodyl (DULCOLAX) EC tablet 5 mg  (has no administration in time range)     And   bisacodyl (DULCOLAX) suppository 10 mg (has no administration in time range)   furosemide (LASIX) injection 40 mg (40 mg Intravenous Given 2/9/24 0308)   dextrose (GLUTOSE) oral gel 15 g (has no administration in time range)   dextrose (D50W) (25 g/50 mL) IV injection 25 g (has no administration in time range)   glucagon (GLUCAGEN) injection 1 mg (has no administration in time range)   Insulin Lispro (humaLOG) injection 2-7 Units (has no administration in time range)   sodium chloride 0.9 % bolus 1,000 mL (has no administration in time range)   dilTIAZem (CARDIZEM) bolus from bag 1 mg/mL 25 mg (25 mg Intravenous Bolus from Bag 2/9/24 0054)   sodium chloride 0.9 % bolus 500 mL (0 mL Intravenous Stopped 2/9/24 0202)   dilTIAZem (CARDIZEM) injection 35 mg (35 mg Intravenous Given 2/9/24 0149)        ED Course:    The patient was initially evaluated in the triage area where orders were placed. The patient was later dispositioned by Fantasma Yip MD.      The patient was advised to stay for completion of workup which includes but is not limited to communication of labs and radiological results, reassessment and plan. The patient was advised that leaving prior to disposition by a provider could result in critical findings that are not communicated to the patient.     ED Course as of 02/09/24 0714   Thu Feb 08, 2024   2331 --- PROVIDER IN TRIAGE NOTE ---    The patient was evaluated by Tatiana salazar in triage. Orders were placed and the patient is currently awaiting disposition.    [AJ]   Fri Feb 09, 2024   0012 My interpretation of EKG: Atrial fibrillation with rapid ventricular response 160, diffuse ST changes [JS]   0202 After initial diltiazem bolus patient continues to have rapid ventricular response in the 130s to 140s.  We treated again with an additional bolus at 0.35 mg/kg with improvement in the patient's heart rate to the 90s/100s he did have an initial dip  in his blood pressure seems to be improving as his rate is controlled.  Will continue to monitor rather than treat with IV fluids due to patient's volume overload. [JS]   0220 I discussed patient's workup and presentation with the hospitalist who agrees to admission.  The patient's airway is intact, vital signs, and respiratory status are safe for admission at this time. [JS]      ED Course User Index  [AJ] Tatiana Freitas PA-C  [JS] Fantasma Yip MD       Labs:    Lab Results (last 24 hours)       Procedure Component Value Units Date/Time    COVID-19, FLU A/B, RSV PCR 1 HR TAT - Swab, Nasopharynx [[id number]]  (Abnormal) Collected: 02/08/24 2332    Specimen: Swab from Nasopharynx Updated: 02/09/24 0047     COVID19 Not Detected     Influenza A PCR Detected     Influenza B PCR Not Detected     RSV, PCR Not Detected    Narrative:      Fact sheet for providers: https://www.fda.gov/media/698615/download    Fact sheet for patients: https://www.fda.gov/media/745548/download    Test performed by PCR.    CBC & Differential [[id number]]  (Abnormal) Collected: 02/09/24 0042    Specimen: Blood Updated: 02/09/24 0110    Narrative:      The following orders were created for panel order CBC & Differential.  Procedure                               Abnormality         Status                     ---------                               -----------         ------                     CBC Auto Differential[[id number]]        Abnormal            Final result               Scan Slide[[id number]]                                       Final result                 Please view results for these tests on the individual orders.    Comprehensive Metabolic Panel [[id number]]  (Abnormal) Collected: 02/09/24 0042    Specimen: Blood Updated: 02/09/24 0108     Glucose 374 mg/dL      BUN 22 mg/dL      Creatinine 1.47 mg/dL      Sodium 131 mmol/L      Potassium 3.8 mmol/L      Chloride 89 mmol/L      CO2 28.6 mmol/L      Calcium 8.5 mg/dL      Total  Protein 6.8 g/dL      Albumin 3.5 g/dL      ALT (SGPT) 7 U/L      AST (SGOT) 18 U/L      Alkaline Phosphatase 63 U/L      Total Bilirubin 1.2 mg/dL      Globulin 3.3 gm/dL      A/G Ratio 1.1 g/dL      BUN/Creatinine Ratio 15.0     Anion Gap 13.4 mmol/L      eGFR 54.9 mL/min/1.73     Narrative:      GFR Normal >60  Chronic Kidney Disease <60  Kidney Failure <15      BNP [[id number]]  (Abnormal) Collected: 02/09/24 0042    Specimen: Blood Updated: 02/09/24 0106     proBNP 4,238.0 pg/mL     Narrative:      This assay is used as an aid in the diagnosis of individuals suspected of having heart failure. It can be used as an aid in the diagnosis of acute decompensated heart failure (ADHF) in patients presenting with signs and symptoms of ADHF to the emergency department (ED). In addition, NT-proBNP of <300 pg/mL indicates ADHF is not likely.    Age Range Result Interpretation  NT-proBNP Concentration (pg/mL:      <50             Positive            >450                   Gray                 300-450                    Negative             <300    50-75           Positive            >900                  Gray                300-900                  Negative            <300      >75             Positive            >1800                  Gray                300-1800                  Negative            <300    Single High Sensitivity Troponin T [[id number]]  (Abnormal) Collected: 02/09/24 0042    Specimen: Blood Updated: 02/09/24 0108     HS Troponin T 27 ng/L     Narrative:      High Sensitive Troponin T Reference Range:  <14.0 ng/L- Negative Female for AMI  <22.0 ng/L- Negative Male for AMI  >=14 - Abnormal Female indicating possible myocardial injury.  >=22 - Abnormal Male indicating possible myocardial injury.   Clinicians would have to utilize clinical acumen, EKG, Troponin, and serial changes to determine if it is an Acute Myocardial Infarction or myocardial injury due to an underlying chronic condition.         CBC Auto  Differential [[id number]]  (Abnormal) Collected: 02/09/24 0042    Specimen: Blood Updated: 02/09/24 0110     WBC 7.97 10*3/mm3      RBC 4.85 10*6/mm3      Hemoglobin 13.1 g/dL      Hematocrit 40.0 %      MCV 82.5 fL      MCH 27.0 pg      MCHC 32.8 g/dL      RDW 13.4 %      RDW-SD 40.1 fl      MPV 12.6 fL      Platelets 85 10*3/mm3      Neutrophil % 80.7 %      Lymphocyte % 9.9 %      Monocyte % 8.4 %      Eosinophil % 0.3 %      Basophil % 0.4 %      Immature Grans % 0.3 %      Neutrophils, Absolute 6.44 10*3/mm3      Lymphocytes, Absolute 0.79 10*3/mm3      Monocytes, Absolute 0.67 10*3/mm3      Eosinophils, Absolute 0.02 10*3/mm3      Basophils, Absolute 0.03 10*3/mm3      Immature Grans, Absolute 0.02 10*3/mm3      nRBC 0.0 /100 WBC     Ammonia [[id number]]  (Normal) Collected: 02/09/24 0042    Specimen: Blood Updated: 02/09/24 0107     Ammonia 24 umol/L     Scan Slide [[id number]] Collected: 02/09/24 0042    Specimen: Blood Updated: 02/09/24 0110     Anisocytosis Slight/1+     Oralia Cells Mod/2+     Poikilocytes Mod/2+     WBC Morphology Normal     Large Platelets Slight/1+    CK [[id number]]  (Abnormal) Collected: 02/09/24 0042    Specimen: Blood Updated: 02/09/24 0246     Creatine Kinase 286 U/L     Protime-INR [[id number]]  (Abnormal) Collected: 02/09/24 0215    Specimen: Blood Updated: 02/09/24 0236     Protime 25.2 Seconds      INR 2.24    Narrative:      Suggested Therapeutic Ranges For Oral Anticoagulant Therapy:  Level of Therapy                      INR Target Range  Standard Dose                            2.0-3.0  High Dose                                2.5-3.5  Patients not receiving anticoagulant  Therapy Normal Range                     0.86-1.15    High Sensitivity Troponin T [[id number]]  (Abnormal) Collected: 02/09/24 0247    Specimen: Blood Updated: 02/09/24 0310     HS Troponin T 24 ng/L     Narrative:      High Sensitive Troponin T Reference Range:  <14.0 ng/L- Negative Female for AMI  <22.0 ng/L-  Negative Male for AMI  >=14 - Abnormal Female indicating possible myocardial injury.  >=22 - Abnormal Male indicating possible myocardial injury.   Clinicians would have to utilize clinical acumen, EKG, Troponin, and serial changes to determine if it is an Acute Myocardial Infarction or myocardial injury due to an underlying chronic condition.         High Sensitivity Troponin T 2Hr [[id number]]  (Abnormal) Collected: 02/09/24 0519    Specimen: Blood Updated: 02/09/24 0551     HS Troponin T 23 ng/L      Troponin T Delta -1 ng/L     Narrative:      High Sensitive Troponin T Reference Range:  <14.0 ng/L- Negative Female for AMI  <22.0 ng/L- Negative Male for AMI  >=14 - Abnormal Female indicating possible myocardial injury.  >=22 - Abnormal Male indicating possible myocardial injury.   Clinicians would have to utilize clinical acumen, EKG, Troponin, and serial changes to determine if it is an Acute Myocardial Infarction or myocardial injury due to an underlying chronic condition.         Basic Metabolic Panel [[id number]]  (Abnormal) Collected: 02/09/24 0641    Specimen: Blood Updated: 02/09/24 0713     Glucose 369 mg/dL      BUN 19 mg/dL      Creatinine 1.26 mg/dL      Sodium 131 mmol/L      Potassium 3.3 mmol/L      Chloride 88 mmol/L      CO2 28.6 mmol/L      Calcium 8.3 mg/dL      BUN/Creatinine Ratio 15.1     Anion Gap 14.4 mmol/L      eGFR 66.1 mL/min/1.73     Narrative:      GFR Normal >60  Chronic Kidney Disease <60  Kidney Failure <15      CBC & Differential [[id number]]  (Abnormal) Collected: 02/09/24 0641    Specimen: Blood Updated: 02/09/24 0658    Narrative:      The following orders were created for panel order CBC & Differential.  Procedure                               Abnormality         Status                     ---------                               -----------         ------                     CBC Auto Differential[[id number]]        Abnormal            Final result                 Please view  results for these tests on the individual orders.    CBC Auto Differential [[id number]]  (Abnormal) Collected: 02/09/24 0641    Specimen: Blood Updated: 02/09/24 0658     WBC 7.21 10*3/mm3      RBC 4.44 10*6/mm3      Hemoglobin 12.2 g/dL      Hematocrit 36.9 %      MCV 83.1 fL      MCH 27.5 pg      MCHC 33.1 g/dL      RDW 13.4 %      RDW-SD 41.1 fl      MPV 12.1 fL      Platelets 75 10*3/mm3      Neutrophil % 78.9 %      Lymphocyte % 13.0 %      Monocyte % 7.6 %      Eosinophil % 0.0 %      Basophil % 0.1 %      Immature Grans % 0.4 %      Neutrophils, Absolute 5.68 10*3/mm3      Lymphocytes, Absolute 0.94 10*3/mm3      Monocytes, Absolute 0.55 10*3/mm3      Eosinophils, Absolute 0.00 10*3/mm3      Basophils, Absolute 0.01 10*3/mm3      Immature Grans, Absolute 0.03 10*3/mm3      nRBC 0.0 /100 WBC              Imaging:    XR Chest 1 View    Result Date: 2/9/2024  PROCEDURE: XR CHEST 1 VW  COMPARISON: 5/4/2017.  INDICATIONS: SOA/SOB/shortness of air/shortness of breath.  FINDINGS:  A single AP (or PA) upright portable chest radiograph was performed.  Mild-to-moderate cardiac enlargement is seen.  No acute infiltrate is appreciated.  No pleural effusion or pneumothorax is identified.  Chronic calcified granulomatous disease may involve the chest.  There are degenerative changes of the shoulders, especially the left acromioclavicular (AC) joint.  There is mild distension of the stomach.  No significant interval change is seen since the prior study (or studies).        No acute infiltrate is appreciated.     Please note that portions of this note were completed with a voice recognition program.  ROYX AMBROSIO JR, MD       Electronically Signed and Approved By: ROXY AMBROSIO JR, MD on 2/09/2024 at 0:59                 Differential Diagnosis and Discussion:      Dyspnea: Differential diagnosis includes but is not limited to metabolic acidosis, neurological disorders, psychogenic, asthma, pneumothorax, upper airway  obstruction, COPD, pneumonia, noncardiogenic pulmonary edema, interstitial lung disease, anemia, congestive heart failure, and pulmonary embolism    All labs were reviewed and interpreted by me.  All X-rays impressions were independently interpreted by me.  EKG was interpreted by me.    MDM     Amount and/or Complexity of Data Reviewed  Decide to obtain previous medical records or to obtain history from someone other than the patient: yes         Critical Care Note: Total Critical Care time of 35 minutes. Total critical care time documented does not include time spent on separately billed procedures for services of nurses or physician assistants. I personally saw and examined the patient. I have reviewed all diagnostic interpretations and treatment plans as written. I was present for the key portions of any procedures performed and the inclusive time noted in any critical care statement. Critical care time includes patient management by me, time spent at the patients bedside,  time to review lab and imaging results, discussing patient care, documentation in the medical record, and time spent with family or caregiver.    Patient was placed on the cardiac monitor after given IV diltiazem.  They were monitored for ventricular ectopy, arrhythmia, tachycardia, hypoxia, and changes in blood pressure.  Patient was rechecked several times throughout their stay.      Patient Care Considerations:    SEPSIS IS NOT PRESENT IN THE EMERGENCY DEPARTMENT: Patient meets SIRS criteria in the emergency department however the patient does not have a known source of bacterial infection to confirm the diagnosis of sepsis.        Consultants/Shared Management Plan:    Hospitalist: I have discussed the case with the hospitalist who agrees to accept the patient for admission.    Social Determinants of Health:    Patient has presented with family members who are responsible, reliable and will ensure follow up care.      Disposition and Care  Coordination:    Admit:   Through independent evaluation of the patient's history, physical, and imperical data, the patient meets criteria for inpatient admission to the hospital.        Final diagnoses:   Atrial fibrillation with rapid ventricular response   Influenza A   Acute pulmonary edema   Pedal edema        ED Disposition       ED Disposition   Decision to Admit    Condition   --    Comment   Level of Care: Telemetry [5]   Diagnosis: Atrial fibrillation with RVR [337240]   Admitting Physician: PABLO COSTELLO [[id number]]   Attending Physician: PABLO COSTELLO [[id number]]   Certification: I Certify That Inpatient Hospital Services Are Medically Necessary For Greater Than 2 Midnights                 This medical record created using voice recognition software.             Fantasma Yip MD  02/09/24 0714

## 2024-02-09 NOTE — SIGNIFICANT NOTE
"   02/09/24 2715   Plan   Plan WILLIAM, RN called patient's cell phone due to isolation.  Mother answered phone in patients room and answered questions due to patient reporting he did not feeling well enough to speak with CM.  Mom reports patient lives with her \"sometimes\" and sometimes he stays with friends.  Reports patient drives and provides own IADL's.  Reports no financial needs at this time.  Facesheet verified.  Possible home O2 at discharge.  CM will follow up with patient to assess discharge needs.       "

## 2024-02-09 NOTE — ED TRIAGE NOTES
Cough, chills, edema in legs and abdomen since January.  Saw PCP today dx with CHF, SOA, abdominal pain, dizzy.

## 2024-02-10 LAB
ANION GAP SERPL CALCULATED.3IONS-SCNC: 8.4 MMOL/L (ref 5–15)
BUN SERPL-MCNC: 20 MG/DL (ref 6–20)
BUN/CREAT SERPL: 17.7 (ref 7–25)
CALCIUM SPEC-SCNC: 8.6 MG/DL (ref 8.6–10.5)
CHLORIDE SERPL-SCNC: 92 MMOL/L (ref 98–107)
CO2 SERPL-SCNC: 32.6 MMOL/L (ref 22–29)
CREAT SERPL-MCNC: 1.13 MG/DL (ref 0.76–1.27)
DEPRECATED RDW RBC AUTO: 41.6 FL (ref 37–54)
EGFRCR SERPLBLD CKD-EPI 2021: 75.3 ML/MIN/1.73
ERYTHROCYTE [DISTWIDTH] IN BLOOD BY AUTOMATED COUNT: 13.5 % (ref 12.3–15.4)
GLUCOSE BLDC GLUCOMTR-MCNC: 108 MG/DL (ref 70–99)
GLUCOSE BLDC GLUCOMTR-MCNC: 130 MG/DL (ref 70–99)
GLUCOSE BLDC GLUCOMTR-MCNC: 159 MG/DL (ref 70–99)
GLUCOSE BLDC GLUCOMTR-MCNC: 83 MG/DL (ref 70–99)
GLUCOSE SERPL-MCNC: 63 MG/DL (ref 65–99)
HCT VFR BLD AUTO: 38.4 % (ref 37.5–51)
HGB BLD-MCNC: 12.3 G/DL (ref 13–17.7)
MAGNESIUM SERPL-MCNC: 1.8 MG/DL (ref 1.6–2.6)
MCH RBC QN AUTO: 27 PG (ref 26.6–33)
MCHC RBC AUTO-ENTMCNC: 32 G/DL (ref 31.5–35.7)
MCV RBC AUTO: 84.2 FL (ref 79–97)
PHOSPHATE SERPL-MCNC: 2.6 MG/DL (ref 2.5–4.5)
PLATELET # BLD AUTO: 82 10*3/MM3 (ref 140–450)
PMV BLD AUTO: 13.1 FL (ref 6–12)
POTASSIUM SERPL-SCNC: 3.4 MMOL/L (ref 3.5–5.2)
QTC INTERVAL: NORMAL MS
QTC INTERVAL: NORMAL MS
RBC # BLD AUTO: 4.56 10*6/MM3 (ref 4.14–5.8)
SODIUM SERPL-SCNC: 133 MMOL/L (ref 136–145)
WBC NRBC COR # BLD AUTO: 6.08 10*3/MM3 (ref 3.4–10.8)

## 2024-02-10 PROCEDURE — 63710000001 INSULIN LISPRO (HUMAN) PER 5 UNITS: Performed by: STUDENT IN AN ORGANIZED HEALTH CARE EDUCATION/TRAINING PROGRAM

## 2024-02-10 PROCEDURE — 85027 COMPLETE CBC AUTOMATED: CPT | Performed by: INTERNAL MEDICINE

## 2024-02-10 PROCEDURE — 84100 ASSAY OF PHOSPHORUS: CPT | Performed by: INTERNAL MEDICINE

## 2024-02-10 PROCEDURE — 82948 REAGENT STRIP/BLOOD GLUCOSE: CPT

## 2024-02-10 PROCEDURE — 63710000001 INSULIN DETEMIR PER 5 UNITS: Performed by: INTERNAL MEDICINE

## 2024-02-10 PROCEDURE — 82948 REAGENT STRIP/BLOOD GLUCOSE: CPT | Performed by: STUDENT IN AN ORGANIZED HEALTH CARE EDUCATION/TRAINING PROGRAM

## 2024-02-10 PROCEDURE — 80048 BASIC METABOLIC PNL TOTAL CA: CPT | Performed by: INTERNAL MEDICINE

## 2024-02-10 PROCEDURE — 25010000002 MAGNESIUM SULFATE 2 GM/50ML SOLUTION: Performed by: INTERNAL MEDICINE

## 2024-02-10 PROCEDURE — 83735 ASSAY OF MAGNESIUM: CPT | Performed by: INTERNAL MEDICINE

## 2024-02-10 PROCEDURE — 99233 SBSQ HOSP IP/OBS HIGH 50: CPT | Performed by: INTERNAL MEDICINE

## 2024-02-10 PROCEDURE — 93005 ELECTROCARDIOGRAM TRACING: CPT | Performed by: INTERNAL MEDICINE

## 2024-02-10 PROCEDURE — 25010000002 FUROSEMIDE PER 20 MG: Performed by: INTERNAL MEDICINE

## 2024-02-10 RX ORDER — POTASSIUM CHLORIDE 750 MG/1
40 CAPSULE, EXTENDED RELEASE ORAL ONCE
Status: COMPLETED | OUTPATIENT
Start: 2024-02-10 | End: 2024-02-10

## 2024-02-10 RX ORDER — BENZONATATE 100 MG/1
100 CAPSULE ORAL 3 TIMES DAILY PRN
Status: DISCONTINUED | OUTPATIENT
Start: 2024-02-10 | End: 2024-02-15 | Stop reason: HOSPADM

## 2024-02-10 RX ORDER — MAGNESIUM SULFATE HEPTAHYDRATE 40 MG/ML
2 INJECTION, SOLUTION INTRAVENOUS ONCE
Status: COMPLETED | OUTPATIENT
Start: 2024-02-10 | End: 2024-02-10

## 2024-02-10 RX ORDER — POLYETHYLENE GLYCOL 3350 17 G
2 POWDER IN PACKET (EA) ORAL
Status: DISCONTINUED | OUTPATIENT
Start: 2024-02-10 | End: 2024-02-15 | Stop reason: HOSPADM

## 2024-02-10 RX ORDER — HYDROCODONE BITARTRATE AND ACETAMINOPHEN 5; 325 MG/1; MG/1
1 TABLET ORAL EVERY 6 HOURS PRN
Status: DISCONTINUED | OUTPATIENT
Start: 2024-02-10 | End: 2024-02-13

## 2024-02-10 RX ORDER — ACETAMINOPHEN 325 MG/1
650 TABLET ORAL EVERY 6 HOURS PRN
Status: DISCONTINUED | OUTPATIENT
Start: 2024-02-10 | End: 2024-02-15 | Stop reason: HOSPADM

## 2024-02-10 RX ORDER — NICOTINE 21 MG/24HR
1 PATCH, TRANSDERMAL 24 HOURS TRANSDERMAL
Status: DISCONTINUED | OUTPATIENT
Start: 2024-02-10 | End: 2024-02-15 | Stop reason: HOSPADM

## 2024-02-10 RX ORDER — HYDROCODONE BITARTRATE AND ACETAMINOPHEN 7.5; 325 MG/1; MG/1
1 TABLET ORAL EVERY 6 HOURS PRN
Status: DISCONTINUED | OUTPATIENT
Start: 2024-02-10 | End: 2024-02-13

## 2024-02-10 RX ADMIN — CARVEDILOL 25 MG: 25 TABLET, FILM COATED ORAL at 08:38

## 2024-02-10 RX ADMIN — DILTIAZEM HYDROCHLORIDE 7.5 MG/HR: 5 INJECTION INTRAVENOUS at 10:35

## 2024-02-10 RX ADMIN — SOTALOL HYDROCHLORIDE 80 MG: 80 TABLET ORAL at 08:39

## 2024-02-10 RX ADMIN — SOTALOL HYDROCHLORIDE 80 MG: 80 TABLET ORAL at 21:09

## 2024-02-10 RX ADMIN — OSELTAMIVIR PHOSPHATE 75 MG: 75 CAPSULE ORAL at 08:38

## 2024-02-10 RX ADMIN — CETIRIZINE HYDROCHLORIDE 10 MG: 10 TABLET, FILM COATED ORAL at 08:38

## 2024-02-10 RX ADMIN — ROSUVASTATIN CALCIUM 20 MG: 20 TABLET, FILM COATED ORAL at 21:09

## 2024-02-10 RX ADMIN — HYDROCODONE BITARTRATE AND ACETAMINOPHEN 1 TABLET: 5; 325 TABLET ORAL at 15:57

## 2024-02-10 RX ADMIN — INSULIN DETEMIR 10 UNITS: 100 INJECTION, SOLUTION SUBCUTANEOUS at 08:37

## 2024-02-10 RX ADMIN — FUROSEMIDE 40 MG: 10 INJECTION, SOLUTION INTRAMUSCULAR; INTRAVENOUS at 08:37

## 2024-02-10 RX ADMIN — LEVOTHYROXINE SODIUM 25 MCG: 25 TABLET ORAL at 05:42

## 2024-02-10 RX ADMIN — ESCITALOPRAM OXALATE 20 MG: 10 TABLET ORAL at 08:38

## 2024-02-10 RX ADMIN — INSULIN LISPRO 2 UNITS: 100 INJECTION, SOLUTION INTRAVENOUS; SUBCUTANEOUS at 12:50

## 2024-02-10 RX ADMIN — ASPIRIN 81 MG: 81 TABLET, COATED ORAL at 08:38

## 2024-02-10 RX ADMIN — CARVEDILOL 25 MG: 25 TABLET, FILM COATED ORAL at 18:44

## 2024-02-10 RX ADMIN — OSELTAMIVIR PHOSPHATE 75 MG: 75 CAPSULE ORAL at 21:09

## 2024-02-10 RX ADMIN — HYDROCODONE BITARTRATE AND ACETAMINOPHEN 1 TABLET: 5; 325 TABLET ORAL at 22:04

## 2024-02-10 RX ADMIN — POTASSIUM CHLORIDE 40 MEQ: 10 CAPSULE, COATED, EXTENDED RELEASE ORAL at 10:43

## 2024-02-10 RX ADMIN — INSULIN LISPRO 2 UNITS: 100 INJECTION, SOLUTION INTRAVENOUS; SUBCUTANEOUS at 21:13

## 2024-02-10 RX ADMIN — Medication 10 ML: at 08:39

## 2024-02-10 RX ADMIN — RIVAROXABAN 20 MG: 20 TABLET, FILM COATED ORAL at 08:38

## 2024-02-10 RX ADMIN — DILTIAZEM HYDROCHLORIDE 7.5 MG/HR: 5 INJECTION INTRAVENOUS at 16:07

## 2024-02-10 RX ADMIN — NICOTINE 1 PATCH: 21 PATCH, EXTENDED RELEASE TRANSDERMAL at 15:58

## 2024-02-10 RX ADMIN — FUROSEMIDE 40 MG: 10 INJECTION, SOLUTION INTRAMUSCULAR; INTRAVENOUS at 18:44

## 2024-02-10 RX ADMIN — BENZONATATE 100 MG: 100 CAPSULE ORAL at 10:43

## 2024-02-10 RX ADMIN — MAGNESIUM SULFATE HEPTAHYDRATE 2 G: 2 INJECTION, SOLUTION INTRAVENOUS at 10:43

## 2024-02-10 NOTE — PROGRESS NOTES
CARDIOLOGY  INPATIENT PROGRESS NOTE                [facility] 5TH FLOOR SURGICAL TELEMETRY UNIT    2/10/2024      PATIENT IDENTIFICATION:   Name:  Fantasma Mcintosh      MRN:  [id number]     59 y.o.  male                 SUBJECTIVE:   Patient stable clinically but still with elevated ventricular rate  OBJECTIVE:  Vitals:    02/10/24 0801 02/10/24 0900 02/10/24 1035 02/10/24 1116   BP: 112/88 106/83 103/88    BP Location:       Patient Position:       Pulse: 116 (!) 132 (!) 127    Resp:       Temp: 97.2 °F (36.2 °C)   97.5 °F (36.4 °C)   TempSrc: Oral   Oral   SpO2:  92%     Weight:       Height:               Body mass index is 28.94 kg/m².    Intake/Output Summary (Last 24 hours) at 2/10/2024 1301  Last data filed at 2/9/2024 1700  Gross per 24 hour   Intake --   Output 650 ml   Net -650 ml       Telemetry: A-fib with RVR    Physical Exam  General Appearance:   no acute distress  Alert and oriented x3  HENT:   lips not cyanotic  Atraumatic  Neck:  No jvd   supple  Respiratory:  no respiratory distress  normal breath sounds  no rales  Cardiovascular:    Irregular  no S3, no S4   no murmur  no rub  lower extremity edema: Mild  Skin:   warm, dry  No rashes      Allergies   Allergen Reactions    Penicillins Itching     Scheduled meds:  aspirin, 81 mg, Oral, Daily  carvedilol, 25 mg, Oral, BID With Meals  cetirizine, 10 mg, Oral, Daily  escitalopram, 20 mg, Oral, Daily  furosemide, 40 mg, Intravenous, BID  insulin detemir, 10 Units, Subcutaneous, Daily  insulin lispro, 2-7 Units, Subcutaneous, 4x Daily AC & at Bedtime  levothyroxine, 25 mcg, Oral, Q AM  oseltamivir, 75 mg, Oral, Q12H  Pharmacy Consult, , Does not apply, Once  rivaroxaban, 20 mg, Oral, Daily  rosuvastatin, 20 mg, Oral, Nightly  sodium chloride, 1,000 mL, Intravenous, Once  sodium chloride, 10 mL, Intravenous, Q12H  sotalol, 80 mg, Oral, Q12H      IV meds:                      dilTIAZem, 5-15 mg/hr, Last Rate: 7.5 mg/hr (02/10/24 1035)      Data  "Review:  CBC          6/6/2023    22:08 2/9/2024    00:42 2/9/2024    06:41 2/10/2024    04:19   CBC   WBC 10.82  7.97  7.21  6.08    RBC 5.32  4.85  4.44  4.56    Hemoglobin 15.1  13.1  12.2  12.3    Hematocrit 44.2  40.0  36.9  38.4    MCV 83.1  82.5  83.1  84.2    MCH 28.4  27.0  27.5  27.0    MCHC 34.2  32.8  33.1  32.0    RDW 12.9  13.4  13.4  13.5    Platelets 196  85  75  82      CMP          6/6/2023    22:08 2/9/2024    00:42 2/9/2024    06:41 2/10/2024    04:19   CMP   Glucose 147  374  369  63    BUN 18  22  19  20    Creatinine 0.80  1.47  1.26  1.13    EGFR 103.2  54.9  66.1  75.3    Sodium 138  131  131  133    Potassium 4.0  3.8  3.3  3.4    Chloride 101  89  88  92    Calcium 9.2  8.5  8.3  8.6    Total Protein 7.4  6.8      Albumin 3.8  3.5      Globulin 3.6  3.3      Total Bilirubin 0.3  1.2      Alkaline Phosphatase 98  63      AST (SGOT) 28  18      ALT (SGPT) 26  7      Albumin/Globulin Ratio 1.1  1.1      BUN/Creatinine Ratio 22.5  15.0  15.1  17.7    Anion Gap 11.2  13.4  14.4  8.4       CARDIAC LABS:      Lab 02/09/24  0519 02/09/24  0247 02/09/24  0215 02/09/24  0042   PROBNP  --   --   --  4,238.0*   HSTROP T 23* 24*  --  27*   PROTIME  --   --  25.2*  --    INR  --   --  2.24*  --         No results found for: \"DIGOXIN\"   Lab Results   Component Value Date    TSH 0.936 06/06/2023           Invalid input(s): \"LDLCALC\"  No results found for: \"POCTROP\"  Lab Results   Component Value Date    TROPONINT 23 (H) 02/09/2024   (  Lab Results   Component Value Date    MG 1.8 02/10/2024     Results for orders placed during the hospital encounter of 02/08/24    Adult Transthoracic Echo Complete W/ Cont if Necessary Per Protocol    Interpretation Summary    Left ventricular systolic function is moderately decreased. Calculated left ventricular EF = 34.7% Ejection fraction may be underestimated with buzz being in Afib with RVR.    The left ventricular cavity is dilated.    Left ventricular diastolic " function was indeterminate.    Mildly reduced right ventricular systolic function noted.    The right ventricular cavity is dilated.    The left atrial cavity is dilated.    The right atrial cavity is dilated.    Estimated right ventricular systolic pressure from tricuspid regurgitation is mildly elevated (35-45 mmHg).    There is a small (<1cm) pericardial effusion.           ASSESSMENT:    Atrial fibrillation with RVR    Influenza A        PLAN:  1.  Continue Coreg 25 twice daily  2.  Sotalol 80 twice daily  3.  Diltiazem GGT titrated to try to keep heart rate less than 120          Vladimir Adams MD  2/10/2024    13:01 EST

## 2024-02-10 NOTE — PLAN OF CARE
Problem: Adult Inpatient Plan of Care  Goal: Plan of Care Review  Outcome: Ongoing, Progressing  Goal: Patient-Specific Goal (Individualized)  Outcome: Ongoing, Progressing  Goal: Absence of Hospital-Acquired Illness or Injury  Outcome: Ongoing, Progressing  Intervention: Identify and Manage Fall Risk  Recent Flowsheet Documentation  Taken 2/10/2024 0800 by Nayeli Dyer RN  Safety Promotion/Fall Prevention:   safety round/check completed   nonskid shoes/slippers when out of bed   lighting adjusted   fall prevention program maintained   clutter free environment maintained   assistive device/personal items within reach   activity supervised  Intervention: Prevent Skin Injury  Recent Flowsheet Documentation  Taken 2/10/2024 0800 by Nayeli Dyer RN  Body Position: sitting up in bed  Intervention: Prevent and Manage VTE (Venous Thromboembolism) Risk  Recent Flowsheet Documentation  Taken 2/10/2024 0800 by Nayeli Dyer RN  Activity Management: ambulated in room  Goal: Optimal Comfort and Wellbeing  Outcome: Ongoing, Progressing  Intervention: Monitor Pain and Promote Comfort  Recent Flowsheet Documentation  Taken 2/10/2024 1557 by Nayeli Dyer RN  Pain Management Interventions: see MAR  Goal: Readiness for Transition of Care  Outcome: Ongoing, Progressing     Problem: Electrolyte Imbalance  Goal: Electrolyte Balance  Outcome: Ongoing, Progressing     Problem: Skin Injury Risk Increased  Goal: Skin Health and Integrity  Outcome: Ongoing, Progressing     Problem: Fall Injury Risk  Goal: Absence of Fall and Fall-Related Injury  Outcome: Ongoing, Progressing  Intervention: Promote Injury-Free Environment  Recent Flowsheet Documentation  Taken 2/10/2024 0800 by Nayeli Dyer RN  Safety Promotion/Fall Prevention:   safety round/check completed   nonskid shoes/slippers when out of bed   lighting adjusted   fall prevention program maintained   clutter free environment maintained   assistive device/personal items  within reach   activity supervised   Goal Outcome Evaluation:                 Patient is on 7.5mg/he cardizem drip.

## 2024-02-10 NOTE — PLAN OF CARE
Goal Outcome Evaluation:              Outcome Evaluation: Pt was weaned off cardizem gtt due to SBP being less than 100 and HR staying less than 120bpm on avg. Pt tolerating po intake with minimal appetite. Pt has no complaints of pain. Will continue to monitor.       Nadege CHRISTY RN

## 2024-02-10 NOTE — PROGRESS NOTES
[facility]   Hospitalist Progress Note  Date: 2/10/2024  Patient Name: Fantasma Mcintosh  : 1965  MRN: [id number]  Date of admission: 2024  Consultants:   -Cardiology: Dr. Grey Andersen, Dr. Vladimir Adams    Subjective   Subjective     Chief Complaint: Edema, palpitations    Summary:   Fantasma Mcintosh is a 59 y.o. male with essential pretension, anxiety, hypothyroidism, hyperlipidemia, chronic paroxysmal atrial fibrillation that presented with lower extremity edema and palpitations.  Eval in ED significant for patient being in A-fib with RVR and labs showing elevated proBNP and elevated creatinine.  Testing did show patient to be positive for influenza A.  Hospitalist service contacted for further evaluation management.  Treatment for influenza A with Tamiflu started.  Cardiology consulted.  Patient started on diltiazem drip.  Patient started on sotalol and home beta-blocker therapy per cardiology recommendation.    Interval Followup:   No acute issues overnight.  Continues on diltiazem drip.  Patient denies any chest pain.  Still with some shortness of breath.  Nursing with no additional acute issues to report.    Objective   Objective     Vitals:   Temp:  [96.5 °F (35.8 °C)-99.3 °F (37.4 °C)] 97.5 °F (36.4 °C)  Heart Rate:  [101-135] 127  Resp:  [20-24] 20  BP: ()/(52-90) 103/88  Flow (L/min):  [4] 4  Physical Exam   Gen: No acute distress, sitting up in bed, pleasant, conversant  Resp: CTAB, improved aeration, no increased work of breathing  Card: IRIR, No m/r/g  Abd: Soft, Nontender, Nondistended, + bowel sounds    Result Review    Result Review:  I have personally reviewed the results as below and agree with these findings:  []  Laboratory:   CMP          2023    22:08 2024    00:42 2024    06:41 2/10/2024    04:19   CMP   Glucose 147  374  369  63    BUN 18  22  19  20    Creatinine 0.80  1.47  1.26  1.13    EGFR 103.2  54.9  66.1  75.3    Sodium 138  131  131  133    Potassium 4.0  3.8  3.3   3.4    Chloride 101  89  88  92    Calcium 9.2  8.5  8.3  8.6    Total Protein 7.4  6.8      Albumin 3.8  3.5      Globulin 3.6  3.3      Total Bilirubin 0.3  1.2      Alkaline Phosphatase 98  63      AST (SGOT) 28  18      ALT (SGPT) 26  7      Albumin/Globulin Ratio 1.1  1.1      BUN/Creatinine Ratio 22.5  15.0  15.1  17.7    Anion Gap 11.2  13.4  14.4  8.4      CBC          6/6/2023    22:08 2/9/2024    00:42 2/9/2024    06:41 2/10/2024    04:19   CBC   WBC 10.82  7.97  7.21  6.08    RBC 5.32  4.85  4.44  4.56    Hemoglobin 15.1  13.1  12.2  12.3    Hematocrit 44.2  40.0  36.9  38.4    MCV 83.1  82.5  83.1  84.2    MCH 28.4  27.0  27.5  27.0    MCHC 34.2  32.8  33.1  32.0    RDW 12.9  13.4  13.4  13.5    Platelets 196  85  75  82    Hypoglycemia noted this morning, more the patient did improve.  Magnesium low.  Phosphorus within normal limits.  []  Microbiology:   []  Radiology:   [x]  EKG/Telemetry:    []  Cardiology/Vascular:    []  Pathology:  []  Old records:  []  Other:    Assessment & Plan   Assessment / Plan     Assessment:  Chronic paroxysmal atrial fibrillation acutely with RVR  Influenza A infection  Acute renal failure (baseline creatinine appears to be 0.7-0.8 and creatinine on admission 1.47)  Acute on chronic heart failure with reduced ejection fraction  Type 2 diabetes mellitus, new diagnosis  Hypothyroidism  Hypokalemia, new  Hypomagnesemia, new  Essential hypertension  Anxiety  Hyperlipidemia  Hyponatremia, not clinically significant  Chronic ongoing tobacco abuse with cigarettes    Plan:  -Cardiology consulted and following, appreciate assistance and recommendations in the care of this patient.  -Continue diltiazem drip, wean as tolerated  -Continue sotalol  -Continue home carvedilol and Xarelto  -Management discussed with cardiology.  Recommend patient to continue on carvedilol, sotalol and Xarelto.  Wean diltiazem drip as tolerated.  -Echocardiogram completed.  EF: 34.7%.  -Continue Tamiflu  for influenza A and treat for total of 5 days (day 2/5)  -Diurese patient with Lasix 40 mg IV twice daily.  Monitor electrolytes and renal function closely given IV diuresis  -Replace magnesium IV and potassium  -Hypoglycemia noted this morning.  Discontinue Levemir.  Continue SSI and monitor blood glucose level and SSI requirement and titrate insulin regimen accordingly.  -Patient does have new diagnosis of type 2 diabetes mellitus, hemoglobin A1c noted to be greater than 15  -Continue appropriate home medications  -Monitor electrolytes and renal function with BMP and magnesium level in the AM  -Monitor WBC and Hgb with CBC in the AM  -Clinical course will dictate further management     DVT Prophylaxis: Xarelto  Diet: Diabetic  Dispo: Home when medically appropriate for discharge  Code Status: Full code     Personally reviewed patients labs and imaging, discussed with patient and nurse at bedside. Discussed management with the following consultants: Cardiology.     Part of this note may be an electronic transcription/translation of spoken language to printed text using the Dragon dictation system.    DVT prophylaxis:  Medical DVT prophylaxis orders are present.      CODE STATUS:   Code Status (Patient has no pulse and is not breathing): CPR (Attempt to Resuscitate)  Medical Interventions (Patient has pulse or is breathing): Full Support      Electronically signed by Kaleb Pérez MD, 02/10/24, 2:22 PM EST.

## 2024-02-11 LAB
ANION GAP SERPL CALCULATED.3IONS-SCNC: 10.2 MMOL/L (ref 5–15)
BUN SERPL-MCNC: 17 MG/DL (ref 6–20)
BUN/CREAT SERPL: 18.1 (ref 7–25)
CALCIUM SPEC-SCNC: 8.6 MG/DL (ref 8.6–10.5)
CHLORIDE SERPL-SCNC: 94 MMOL/L (ref 98–107)
CO2 SERPL-SCNC: 32.8 MMOL/L (ref 22–29)
CREAT SERPL-MCNC: 0.94 MG/DL (ref 0.76–1.27)
DEPRECATED RDW RBC AUTO: 41.9 FL (ref 37–54)
EGFRCR SERPLBLD CKD-EPI 2021: 94 ML/MIN/1.73
ERYTHROCYTE [DISTWIDTH] IN BLOOD BY AUTOMATED COUNT: 13.6 % (ref 12.3–15.4)
GLUCOSE BLDC GLUCOMTR-MCNC: 107 MG/DL (ref 70–99)
GLUCOSE BLDC GLUCOMTR-MCNC: 144 MG/DL (ref 70–99)
GLUCOSE BLDC GLUCOMTR-MCNC: 151 MG/DL (ref 70–99)
GLUCOSE BLDC GLUCOMTR-MCNC: 223 MG/DL (ref 70–99)
GLUCOSE BLDC GLUCOMTR-MCNC: 256 MG/DL (ref 70–99)
GLUCOSE SERPL-MCNC: 96 MG/DL (ref 65–99)
HCT VFR BLD AUTO: 38.9 % (ref 37.5–51)
HGB BLD-MCNC: 12.4 G/DL (ref 13–17.7)
MAGNESIUM SERPL-MCNC: 1.9 MG/DL (ref 1.6–2.6)
MCH RBC QN AUTO: 26.9 PG (ref 26.6–33)
MCHC RBC AUTO-ENTMCNC: 31.9 G/DL (ref 31.5–35.7)
MCV RBC AUTO: 84.4 FL (ref 79–97)
PHOSPHATE SERPL-MCNC: 3 MG/DL (ref 2.5–4.5)
PLATELET # BLD AUTO: 97 10*3/MM3 (ref 140–450)
PMV BLD AUTO: 12.6 FL (ref 6–12)
POTASSIUM SERPL-SCNC: 3.5 MMOL/L (ref 3.5–5.2)
QT INTERVAL: 368 MS
QT INTERVAL: 509 MS
QTC INTERVAL: 496 MS
QTC INTERVAL: 583 MS
QTC INTERVAL: NORMAL MS
RBC # BLD AUTO: 4.61 10*6/MM3 (ref 4.14–5.8)
SODIUM SERPL-SCNC: 137 MMOL/L (ref 136–145)
WBC NRBC COR # BLD AUTO: 6.04 10*3/MM3 (ref 3.4–10.8)

## 2024-02-11 PROCEDURE — 85027 COMPLETE CBC AUTOMATED: CPT | Performed by: INTERNAL MEDICINE

## 2024-02-11 PROCEDURE — 83735 ASSAY OF MAGNESIUM: CPT | Performed by: INTERNAL MEDICINE

## 2024-02-11 PROCEDURE — 84100 ASSAY OF PHOSPHORUS: CPT | Performed by: INTERNAL MEDICINE

## 2024-02-11 PROCEDURE — 93005 ELECTROCARDIOGRAM TRACING: CPT | Performed by: INTERNAL MEDICINE

## 2024-02-11 PROCEDURE — 63710000001 INSULIN LISPRO (HUMAN) PER 5 UNITS: Performed by: STUDENT IN AN ORGANIZED HEALTH CARE EDUCATION/TRAINING PROGRAM

## 2024-02-11 PROCEDURE — 80048 BASIC METABOLIC PNL TOTAL CA: CPT | Performed by: INTERNAL MEDICINE

## 2024-02-11 PROCEDURE — 99233 SBSQ HOSP IP/OBS HIGH 50: CPT | Performed by: INTERNAL MEDICINE

## 2024-02-11 PROCEDURE — 25010000002 FUROSEMIDE PER 20 MG: Performed by: INTERNAL MEDICINE

## 2024-02-11 PROCEDURE — 82948 REAGENT STRIP/BLOOD GLUCOSE: CPT

## 2024-02-11 PROCEDURE — 82948 REAGENT STRIP/BLOOD GLUCOSE: CPT | Performed by: STUDENT IN AN ORGANIZED HEALTH CARE EDUCATION/TRAINING PROGRAM

## 2024-02-11 RX ORDER — FUROSEMIDE 10 MG/ML
40 INJECTION INTRAMUSCULAR; INTRAVENOUS DAILY
Status: DISCONTINUED | OUTPATIENT
Start: 2024-02-11 | End: 2024-02-15 | Stop reason: HOSPADM

## 2024-02-11 RX ORDER — DILTIAZEM HYDROCHLORIDE 180 MG/1
180 CAPSULE, COATED, EXTENDED RELEASE ORAL
Status: DISCONTINUED | OUTPATIENT
Start: 2024-02-11 | End: 2024-02-15 | Stop reason: HOSPADM

## 2024-02-11 RX ADMIN — DILTIAZEM HYDROCHLORIDE 180 MG: 180 CAPSULE, COATED, EXTENDED RELEASE ORAL at 10:25

## 2024-02-11 RX ADMIN — NICOTINE 1 PATCH: 21 PATCH, EXTENDED RELEASE TRANSDERMAL at 08:37

## 2024-02-11 RX ADMIN — LEVOTHYROXINE SODIUM 25 MCG: 25 TABLET ORAL at 05:47

## 2024-02-11 RX ADMIN — CETIRIZINE HYDROCHLORIDE 10 MG: 10 TABLET, FILM COATED ORAL at 08:35

## 2024-02-11 RX ADMIN — INSULIN LISPRO 3 UNITS: 100 INJECTION, SOLUTION INTRAVENOUS; SUBCUTANEOUS at 17:29

## 2024-02-11 RX ADMIN — INSULIN LISPRO 4 UNITS: 100 INJECTION, SOLUTION INTRAVENOUS; SUBCUTANEOUS at 20:58

## 2024-02-11 RX ADMIN — Medication 10 ML: at 08:36

## 2024-02-11 RX ADMIN — RIVAROXABAN 20 MG: 20 TABLET, FILM COATED ORAL at 08:35

## 2024-02-11 RX ADMIN — FUROSEMIDE 40 MG: 10 INJECTION, SOLUTION INTRAMUSCULAR; INTRAVENOUS at 08:35

## 2024-02-11 RX ADMIN — CARVEDILOL 25 MG: 25 TABLET, FILM COATED ORAL at 17:29

## 2024-02-11 RX ADMIN — SOTALOL HYDROCHLORIDE 80 MG: 80 TABLET ORAL at 08:35

## 2024-02-11 RX ADMIN — OSELTAMIVIR PHOSPHATE 75 MG: 75 CAPSULE ORAL at 20:58

## 2024-02-11 RX ADMIN — ROSUVASTATIN CALCIUM 20 MG: 20 TABLET, FILM COATED ORAL at 20:57

## 2024-02-11 RX ADMIN — OSELTAMIVIR PHOSPHATE 75 MG: 75 CAPSULE ORAL at 08:35

## 2024-02-11 RX ADMIN — SOTALOL HYDROCHLORIDE 80 MG: 80 TABLET ORAL at 20:58

## 2024-02-11 RX ADMIN — ASPIRIN 81 MG: 81 TABLET, COATED ORAL at 08:34

## 2024-02-11 RX ADMIN — ESCITALOPRAM OXALATE 20 MG: 10 TABLET ORAL at 08:34

## 2024-02-11 RX ADMIN — CARVEDILOL 25 MG: 25 TABLET, FILM COATED ORAL at 08:35

## 2024-02-11 RX ADMIN — Medication 10 ML: at 20:58

## 2024-02-11 NOTE — PROGRESS NOTES
CARDIOLOGY  INPATIENT PROGRESS NOTE                [facility] 5TH FLOOR SURGICAL TELEMETRY UNIT    2/11/2024      PATIENT IDENTIFICATION:   Name:  Fantasma Mcintosh      MRN:  [id number]     59 y.o.  male                 SUBJECTIVE:   Patient with no new events overnight's heart rate improved but still tachycardic above 100s consistently  OBJECTIVE:  Vitals:    02/11/24 0340 02/11/24 0557 02/11/24 0700 02/11/24 0824   BP: 94/77   108/95   BP Location: Left arm   Left arm   Patient Position: Lying   Lying   Pulse: 103   115   Resp: 20   20   Temp: 97.3 °F (36.3 °C)  97.3 °F (36.3 °C)    TempSrc:    Oral   SpO2: 94%   94%   Weight:  107 kg (235 lb 14.3 oz)     Height:               Body mass index is 28.73 kg/m².    Intake/Output Summary (Last 24 hours) at 2/11/2024 0855  Last data filed at 2/10/2024 1730  Gross per 24 hour   Intake 0 ml   Output --   Net 0 ml       Telemetry: A-fib trend about 100    Physical Exam  General Appearance:   no acute distress  Alert and oriented x3  HENT:   lips not cyanotic  Atraumatic  Neck:  No jvd   supple  Respiratory:  no respiratory distress  normal breath sounds  no rales  Cardiovascular:    r irregular  no S3, no S4   no murmur  no rub  lower extremity edema: Trace  Skin:   warm, dry  No rashes      Allergies   Allergen Reactions    Penicillins Itching     Scheduled meds:  aspirin, 81 mg, Oral, Daily  carvedilol, 25 mg, Oral, BID With Meals  cetirizine, 10 mg, Oral, Daily  escitalopram, 20 mg, Oral, Daily  furosemide, 40 mg, Intravenous, Daily  insulin lispro, 2-7 Units, Subcutaneous, 4x Daily AC & at Bedtime  levothyroxine, 25 mcg, Oral, Q AM  nicotine, 1 patch, Transdermal, Q24H  oseltamivir, 75 mg, Oral, Q12H  Pharmacy Consult, , Does not apply, Once  rivaroxaban, 20 mg, Oral, Daily  rosuvastatin, 20 mg, Oral, Nightly  sodium chloride, 1,000 mL, Intravenous, Once  sodium chloride, 10 mL, Intravenous, Q12H  sotalol, 80 mg, Oral, Q12H      IV meds:                       "dilTIAZem, 5-15 mg/hr, Last Rate: 2.5 mg/hr (02/10/24 2239)      Data Review:  CBC          2/9/2024    00:42 2/9/2024    06:41 2/10/2024    04:19 2/11/2024    04:06   CBC   WBC 7.97  7.21  6.08  6.04    RBC 4.85  4.44  4.56  4.61    Hemoglobin 13.1  12.2  12.3  12.4    Hematocrit 40.0  36.9  38.4  38.9    MCV 82.5  83.1  84.2  84.4    MCH 27.0  27.5  27.0  26.9    MCHC 32.8  33.1  32.0  31.9    RDW 13.4  13.4  13.5  13.6    Platelets 85  75  82  97      CMP          2/9/2024    00:42 2/9/2024    06:41 2/10/2024    04:19 2/11/2024    04:06   CMP   Glucose 374  369  63  96    BUN 22  19  20  17    Creatinine 1.47  1.26  1.13  0.94    EGFR 54.9  66.1  75.3  94.0    Sodium 131  131  133  137    Potassium 3.8  3.3  3.4  3.5    Chloride 89  88  92  94    Calcium 8.5  8.3  8.6  8.6    Total Protein 6.8       Albumin 3.5       Globulin 3.3       Total Bilirubin 1.2       Alkaline Phosphatase 63       AST (SGOT) 18       ALT (SGPT) 7       Albumin/Globulin Ratio 1.1       BUN/Creatinine Ratio 15.0  15.1  17.7  18.1    Anion Gap 13.4  14.4  8.4  10.2       CARDIAC LABS:      Lab 02/09/24  0519 02/09/24  0247 02/09/24  0215 02/09/24  0042   PROBNP  --   --   --  4,238.0*   HSTROP T 23* 24*  --  27*   PROTIME  --   --  25.2*  --    INR  --   --  2.24*  --         No results found for: \"DIGOXIN\"   Lab Results   Component Value Date    TSH 0.936 06/06/2023           Invalid input(s): \"LDLCALC\"  No results found for: \"POCTROP\"  Lab Results   Component Value Date    TROPONINT 23 (H) 02/09/2024   (  Lab Results   Component Value Date    MG 1.9 02/11/2024     Results for orders placed during the hospital encounter of 02/08/24    Adult Transthoracic Echo Complete W/ Cont if Necessary Per Protocol    Interpretation Summary    Left ventricular systolic function is moderately decreased. Calculated left ventricular EF = 34.7% Ejection fraction may be underestimated with buzz being in Afib with RVR.    The left ventricular cavity is " dilated.    Left ventricular diastolic function was indeterminate.    Mildly reduced right ventricular systolic function noted.    The right ventricular cavity is dilated.    The left atrial cavity is dilated.    The right atrial cavity is dilated.    Estimated right ventricular systolic pressure from tricuspid regurgitation is mildly elevated (35-45 mmHg).    There is a small (<1cm) pericardial effusion.           ASSESSMENT:    Atrial fibrillation with RVR    Influenza A        PLAN:  1.  Will repeat EKG to see if QT is truly prolonged very difficult to measure given patient's low voltage and small T waves.  If QT is we will need to hold his sotalol dose  2.  Continue with his Coreg 25 twice daily  3.  Add diltiazem 180 daily and continue with down titration of diltiazem drip if able down to 5 mg currently  4.  Discussed with him the possibility of cardioversion given the persistent difficulty in maintaining heart rate control and his blood pressure starting to be on the low normal side patient was unsure if he wanted to do this at this point but was going to think it over      Vladimir Adams MD  2/11/2024    08:55 EST

## 2024-02-11 NOTE — PROGRESS NOTES
[facility]   Hospitalist Progress Note  Date: 2024  Patient Name: Fantasma Mcintosh  : 1965  MRN: [id number]  Date of admission: 2024  Consultants:   -Cardiology: Dr. Grey Andersen, Dr. Vladimir Adams    Subjective   Subjective     Chief Complaint: Edema, palpitations    Summary:   Fantasma Mcintosh is a 59 y.o. male with essential pretension, anxiety, hypothyroidism, hyperlipidemia, chronic paroxysmal atrial fibrillation that presented with lower extremity edema and palpitations.  Eval in ED significant for patient being in A-fib with RVR and labs showing elevated proBNP and elevated creatinine.  Testing did show patient to be positive for influenza A.  Hospitalist service contacted for further evaluation management.  Treatment for influenza A with Tamiflu started.  Cardiology consulted.  Patient started on diltiazem drip.  Patient started on sotalol and home beta-blocker therapy per cardiology recommendation.    Interval Followup:   No acute events overnight.  Heart rate somewhat improved but continues to be tachycardic over 100 consistently.  Denies any chest pain or shortness of breath.  Renal function has improved.  Nursing with no additional acute issues to report.    Objective   Objective     Vitals:   Temp:  [97.2 °F (36.2 °C)-97.6 °F (36.4 °C)] 97.6 °F (36.4 °C)  Heart Rate:  [] 121  Resp:  [18-20] 18  BP: ()/(60-95) 98/78  Flow (L/min):  [4] 4  Physical Exam   Gen: No acute distress, sitting up in bed, conversant  Resp: CTAB, improved aeration, no increased work of breathing  Card: IRIR, No m/r/g  Abd: Soft, Nontender, Nondistended, + bowel sounds    Result Review    Result Review:  I have personally reviewed the results as below and agree with these findings:  []  Laboratory:   CMP          2024    00:42 2024    06:41 2/10/2024    04:19 2024    04:06   CMP   Glucose 374  369  63  96    BUN 22  19  20  17    Creatinine 1.47  1.26  1.13  0.94    EGFR 54.9  66.1  75.3  94.0     Sodium 131  131  133  137    Potassium 3.8  3.3  3.4  3.5    Chloride 89  88  92  94    Calcium 8.5  8.3  8.6  8.6    Total Protein 6.8       Albumin 3.5       Globulin 3.3       Total Bilirubin 1.2       Alkaline Phosphatase 63       AST (SGOT) 18       ALT (SGPT) 7       Albumin/Globulin Ratio 1.1       BUN/Creatinine Ratio 15.0  15.1  17.7  18.1    Anion Gap 13.4  14.4  8.4  10.2      CBC          2/9/2024    00:42 2/9/2024    06:41 2/10/2024    04:19 2/11/2024    04:06   CBC   WBC 7.97  7.21  6.08  6.04    RBC 4.85  4.44  4.56  4.61    Hemoglobin 13.1  12.2  12.3  12.4    Hematocrit 40.0  36.9  38.4  38.9    MCV 82.5  83.1  84.2  84.4    MCH 27.0  27.5  27.0  26.9    MCHC 32.8  33.1  32.0  31.9    RDW 13.4  13.4  13.5  13.6    Platelets 85  75  82  97    Blood glucose well-controlled.  Phosphorus and magnesium within normal limits.  []  Microbiology:   []  Radiology:   [x]  EKG/Telemetry:    []  Cardiology/Vascular:    []  Pathology:  []  Old records:  []  Other:    Assessment & Plan   Assessment / Plan     Assessment:  Chronic paroxysmal atrial fibrillation acutely with RVR  Influenza A infection  Acute renal failure (baseline creatinine appears to be 0.7-0.8 and creatinine on admission 1.47)  Acute on chronic heart failure with reduced ejection fraction  Type 2 diabetes mellitus, new diagnosis  Hypothyroidism  Hypokalemia, new  Hypomagnesemia, new  Essential hypertension  Anxiety  Hyperlipidemia  Hyponatremia, not clinically significant  Chronic ongoing tobacco abuse with cigarettes    Plan:  -Cardiology consulted and following, appreciate assistance and recommendations in the care of this patient.  -Continue diltiazem drip, wean as tolerated  -Continue sotalol  -Continue home carvedilol and Xarelto  -Obtain EKG to see if QT is prolonged.  -Start oral diltiazem per cardiology  -Management discussed with cardiology service.  Patient may require cardioversion given persistent difficulty maintaining heart rate  control and patient's blood pressure being on the low side of normal.  -Continue Tamiflu for influenza A and treat for total of 5 days (day 3/5)  -Diurese patient with Lasix 40 mg IV twice daily.  Monitor electrolytes and renal function closely given IV diuresis  -Continue current insulin regimen.  Diabetes nurse educator consulted given hemoglobin A1c greater than 15.  -Continue appropriate home medications  -Will monitor electrolytes and renal function with BMP and magnesium level in the AM  -Will monitor WBC and Hgb with CBC in the AM  -Clinical course will dictate further management     DVT Prophylaxis: Xarelto  Diet: Diabetic  Dispo: Home when medically appropriate for discharge  Code Status: Full code     Time spent personally reviewing patient's chart, labs and imaging, evaluating/examining the patient, discussing care plan with patient and nurse at bedside and discussing management with consultants (Cardiology): 51 minutes.     Part of this note may be an electronic transcription/translation of spoken language to printed text using the Dragon dictation system.    DVT prophylaxis:  Medical DVT prophylaxis orders are present.      CODE STATUS:   Code Status (Patient has no pulse and is not breathing): CPR (Attempt to Resuscitate)  Medical Interventions (Patient has pulse or is breathing): Full Support      Electronically signed by Kaleb Pérez MD, 02/11/24, 12:43 PM EST.

## 2024-02-11 NOTE — PLAN OF CARE
Goal Outcome Evaluation:         Pt on cardizem drip overnight, titrated per MAR. Pt afib in  most of the night, slightly hypotensive at times. Bed alert on. Wife at bedside overnight. Continuous pulse ox in place, pt on 4L NC. EKG done per order. QT noticed to be lengthening with last one being 583- will pass off in report. Pt with mild c/o back pain and medicated per MAR. Pt given urinal and education on intake and output monitoring.     Patti Sands RN

## 2024-02-12 LAB
ANION GAP SERPL CALCULATED.3IONS-SCNC: 12.5 MMOL/L (ref 5–15)
BUN SERPL-MCNC: 18 MG/DL (ref 6–20)
BUN/CREAT SERPL: 19.8 (ref 7–25)
CALCIUM SPEC-SCNC: 8.6 MG/DL (ref 8.6–10.5)
CHLORIDE SERPL-SCNC: 92 MMOL/L (ref 98–107)
CO2 SERPL-SCNC: 31.5 MMOL/L (ref 22–29)
CREAT SERPL-MCNC: 0.91 MG/DL (ref 0.76–1.27)
DEPRECATED RDW RBC AUTO: 41.1 FL (ref 37–54)
EGFRCR SERPLBLD CKD-EPI 2021: 97.7 ML/MIN/1.73
ERYTHROCYTE [DISTWIDTH] IN BLOOD BY AUTOMATED COUNT: 13.3 % (ref 12.3–15.4)
GLUCOSE BLDC GLUCOMTR-MCNC: 163 MG/DL (ref 70–99)
GLUCOSE BLDC GLUCOMTR-MCNC: 174 MG/DL (ref 70–99)
GLUCOSE BLDC GLUCOMTR-MCNC: 190 MG/DL (ref 70–99)
GLUCOSE BLDC GLUCOMTR-MCNC: 213 MG/DL (ref 70–99)
GLUCOSE SERPL-MCNC: 200 MG/DL (ref 65–99)
HCT VFR BLD AUTO: 37.6 % (ref 37.5–51)
HGB BLD-MCNC: 12.2 G/DL (ref 13–17.7)
MAGNESIUM SERPL-MCNC: 1.7 MG/DL (ref 1.6–2.6)
MCH RBC QN AUTO: 27.2 PG (ref 26.6–33)
MCHC RBC AUTO-ENTMCNC: 32.4 G/DL (ref 31.5–35.7)
MCV RBC AUTO: 83.9 FL (ref 79–97)
PHOSPHATE SERPL-MCNC: 2.8 MG/DL (ref 2.5–4.5)
PLATELET # BLD AUTO: 110 10*3/MM3 (ref 140–450)
PMV BLD AUTO: 12.1 FL (ref 6–12)
POTASSIUM SERPL-SCNC: 3.5 MMOL/L (ref 3.5–5.2)
QTC INTERVAL: NORMAL MS
RBC # BLD AUTO: 4.48 10*6/MM3 (ref 4.14–5.8)
SODIUM SERPL-SCNC: 136 MMOL/L (ref 136–145)
WBC NRBC COR # BLD AUTO: 6.79 10*3/MM3 (ref 3.4–10.8)

## 2024-02-12 PROCEDURE — 82948 REAGENT STRIP/BLOOD GLUCOSE: CPT

## 2024-02-12 PROCEDURE — 83735 ASSAY OF MAGNESIUM: CPT | Performed by: INTERNAL MEDICINE

## 2024-02-12 PROCEDURE — 80048 BASIC METABOLIC PNL TOTAL CA: CPT | Performed by: INTERNAL MEDICINE

## 2024-02-12 PROCEDURE — 63710000001 INSULIN LISPRO (HUMAN) PER 5 UNITS: Performed by: STUDENT IN AN ORGANIZED HEALTH CARE EDUCATION/TRAINING PROGRAM

## 2024-02-12 PROCEDURE — 25010000002 MAGNESIUM SULFATE 2 GM/50ML SOLUTION: Performed by: INTERNAL MEDICINE

## 2024-02-12 PROCEDURE — 25010000002 FUROSEMIDE PER 20 MG: Performed by: INTERNAL MEDICINE

## 2024-02-12 PROCEDURE — 82948 REAGENT STRIP/BLOOD GLUCOSE: CPT | Performed by: STUDENT IN AN ORGANIZED HEALTH CARE EDUCATION/TRAINING PROGRAM

## 2024-02-12 PROCEDURE — 85027 COMPLETE CBC AUTOMATED: CPT | Performed by: INTERNAL MEDICINE

## 2024-02-12 PROCEDURE — 99233 SBSQ HOSP IP/OBS HIGH 50: CPT | Performed by: INTERNAL MEDICINE

## 2024-02-12 PROCEDURE — 84100 ASSAY OF PHOSPHORUS: CPT | Performed by: INTERNAL MEDICINE

## 2024-02-12 PROCEDURE — 93005 ELECTROCARDIOGRAM TRACING: CPT | Performed by: INTERNAL MEDICINE

## 2024-02-12 RX ORDER — MAGNESIUM SULFATE HEPTAHYDRATE 40 MG/ML
2 INJECTION, SOLUTION INTRAVENOUS ONCE
Status: COMPLETED | OUTPATIENT
Start: 2024-02-12 | End: 2024-02-12

## 2024-02-12 RX ORDER — POTASSIUM CHLORIDE 750 MG/1
40 CAPSULE, EXTENDED RELEASE ORAL ONCE
Status: COMPLETED | OUTPATIENT
Start: 2024-02-12 | End: 2024-02-12

## 2024-02-12 RX ADMIN — DILTIAZEM HYDROCHLORIDE 2.5 MG/HR: 5 INJECTION INTRAVENOUS at 01:21

## 2024-02-12 RX ADMIN — INSULIN LISPRO 2 UNITS: 100 INJECTION, SOLUTION INTRAVENOUS; SUBCUTANEOUS at 17:17

## 2024-02-12 RX ADMIN — SOTALOL HYDROCHLORIDE 80 MG: 80 TABLET ORAL at 08:50

## 2024-02-12 RX ADMIN — Medication 10 ML: at 08:49

## 2024-02-12 RX ADMIN — ESCITALOPRAM OXALATE 20 MG: 10 TABLET ORAL at 08:50

## 2024-02-12 RX ADMIN — FUROSEMIDE 40 MG: 10 INJECTION, SOLUTION INTRAMUSCULAR; INTRAVENOUS at 08:50

## 2024-02-12 RX ADMIN — CARVEDILOL 25 MG: 25 TABLET, FILM COATED ORAL at 08:52

## 2024-02-12 RX ADMIN — ROSUVASTATIN CALCIUM 20 MG: 20 TABLET, FILM COATED ORAL at 21:49

## 2024-02-12 RX ADMIN — CARVEDILOL 25 MG: 25 TABLET, FILM COATED ORAL at 17:17

## 2024-02-12 RX ADMIN — INSULIN LISPRO 2 UNITS: 100 INJECTION, SOLUTION INTRAVENOUS; SUBCUTANEOUS at 12:45

## 2024-02-12 RX ADMIN — LEVOTHYROXINE SODIUM 25 MCG: 25 TABLET ORAL at 06:22

## 2024-02-12 RX ADMIN — DILTIAZEM HYDROCHLORIDE 180 MG: 180 CAPSULE, COATED, EXTENDED RELEASE ORAL at 08:50

## 2024-02-12 RX ADMIN — OSELTAMIVIR PHOSPHATE 75 MG: 75 CAPSULE ORAL at 21:49

## 2024-02-12 RX ADMIN — SOTALOL HYDROCHLORIDE 80 MG: 80 TABLET ORAL at 21:49

## 2024-02-12 RX ADMIN — MAGNESIUM SULFATE HEPTAHYDRATE 2 G: 2 INJECTION, SOLUTION INTRAVENOUS at 10:15

## 2024-02-12 RX ADMIN — Medication 10 ML: at 21:49

## 2024-02-12 RX ADMIN — POTASSIUM CHLORIDE 40 MEQ: 10 CAPSULE, COATED, EXTENDED RELEASE ORAL at 08:50

## 2024-02-12 RX ADMIN — ASPIRIN 81 MG: 81 TABLET, COATED ORAL at 08:50

## 2024-02-12 RX ADMIN — INSULIN LISPRO 2 UNITS: 100 INJECTION, SOLUTION INTRAVENOUS; SUBCUTANEOUS at 08:49

## 2024-02-12 RX ADMIN — OSELTAMIVIR PHOSPHATE 75 MG: 75 CAPSULE ORAL at 08:50

## 2024-02-12 RX ADMIN — CETIRIZINE HYDROCHLORIDE 10 MG: 10 TABLET, FILM COATED ORAL at 08:50

## 2024-02-12 RX ADMIN — RIVAROXABAN 20 MG: 20 TABLET, FILM COATED ORAL at 08:50

## 2024-02-12 RX ADMIN — INSULIN LISPRO 3 UNITS: 100 INJECTION, SOLUTION INTRAVENOUS; SUBCUTANEOUS at 21:49

## 2024-02-12 NOTE — PROGRESS NOTES
CARDIOLOGY  INPATIENT PROGRESS NOTE         [facility] 5TH FLOOR SURGICAL TELEMETRY UNIT    2/12/2024      PATIENT IDENTIFICATION:   Name:  Fantasma Mcintosh      MRN:  [id number]     59 y.o.  male             Reason for visit: Influenza A, shortness of breath, persistent atrial fibrillation      SUBJECTIVE:    The patient is clinically stable, he remains on nasal cannula oxygen.  Heart rates 100-1 20.  He is on low-dose IV diltiazem, in addition to beta-blocker, oral Cardizem, and sotalol loading.  OBJECTIVE:  Vitals:    02/12/24 0400 02/12/24 0539 02/12/24 0600 02/12/24 0700   BP: 104/84  105/83 104/87   Pulse: 117  112 111   Resp:       Temp:    98.1 °F (36.7 °C)   TempSrc:       SpO2: 93%  95% 93%   Weight:  106 kg (233 lb 14.5 oz)     Height:               Body mass index is 28.48 kg/m².    Intake/Output Summary (Last 24 hours) at 2/12/2024 0856  Last data filed at 2/12/2024 0842  Gross per 24 hour   Intake 480 ml   Output 2150 ml   Net -1670 ml       Telemetry: Atrial fibrillation with mildly rapid rates    Review of Systems   Respiratory:  Positive for shortness of breath. Negative for chest tightness.    Cardiovascular:  Negative for chest pain and palpitations.         Exam:  General appearance no acute distress    well nourished   HEENT sclerae non-icteric    lips not cyanotic   Respiratory rate and depth normal    normal breath sounds    no rales, no wheeze   Cardiovascular JVP normal    Irregularly irregular rhythm    S1 normal, S2 normal    no S3, no S4    no murmur    lower extremity edema:none   Abdominal bowel sounds normal    abdomen soft, non-tender    no hepatosplenomegaly   Neuro-psych oriented to person, place, time    cooperative     Allergies   Allergen Reactions    Penicillins Itching     Scheduled meds:  aspirin, 81 mg, Oral, Daily  carvedilol, 25 mg, Oral, BID With Meals  cetirizine, 10 mg, Oral, Daily  dilTIAZem CD, 180 mg, Oral, Q24H  escitalopram, 20 mg, Oral, Daily  furosemide, 40  "mg, Intravenous, Daily  insulin lispro, 2-7 Units, Subcutaneous, 4x Daily AC & at Bedtime  levothyroxine, 25 mcg, Oral, Q AM  nicotine, 1 patch, Transdermal, Q24H  oseltamivir, 75 mg, Oral, Q12H  Pharmacy Consult, , Does not apply, Once  rivaroxaban, 20 mg, Oral, Daily  rosuvastatin, 20 mg, Oral, Nightly  sodium chloride, 1,000 mL, Intravenous, Once  sodium chloride, 10 mL, Intravenous, Q12H  sotalol, 80 mg, Oral, Q12H      IV meds:                      dilTIAZem, 5-15 mg/hr, Last Rate: 2.5 mg/hr (02/12/24 0121)      Data Review:  Results from last 7 days   Lab Units 02/12/24  0419 02/11/24  0406 02/10/24  0419 02/09/24  0641   SODIUM mmol/L 136 137 133* 131*   BUN mg/dL 18 17 20 19   CREATININE mg/dL 0.91 0.94 1.13 1.26   GLUCOSE mg/dL 200* 96 63* 369*             Estimated Creatinine Clearance: 118.3 mL/min (by C-G formula based on SCr of 0.91 mg/dL).  Results from last 7 days   Lab Units 02/12/24 0419 02/11/24  0406 02/10/24  0419 02/09/24  0641 02/09/24  0042   WBC 10*3/mm3 6.79 6.04 6.08 7.21 7.97   HEMOGLOBIN g/dL 12.2* 12.4* 12.3* 12.2* 13.1     Results from last 7 days   Lab Units 02/09/24  0215   INR  2.24*     Results from last 7 days   Lab Units 02/09/24  0042   ALT (SGPT) U/L 7   AST (SGOT) U/L 18     No results found for: \"DIGOXIN\"   Lab Results   Component Value Date    TSH 0.936 06/06/2023           Invalid input(s): \"LDLCALC\"            Imaging (last 24 hr):   Imaging Results (Last 24 Hours)       ** No results found for the last 24 hours. **              ASSESSMENT:     Atrial fibrillation with RVR    Influenza A        PLAN:  1.  EKG today showed atrial fibrillation, QTc appears within acceptable limits although the T waves are very flat, and lead II it appears clear the QTc is normal.  2.  He would likely benefit from elective cardioversion before discharge, I do not think he is ready for that today.  Would plan on possibly Wednesday morning first thing.  He reports back in 2019 they were unable " "to successfully cardiovert him here locally and he had to be transferred to [place] for that.  It is unclear what the issue was.  The only thing I can find in the records is he was referred to Dr. Gloria with [facility] and that note indicated that he had a failed cardioversion but eventually had a successful cardioversion after being loaded with amiodarone at [facility].  His family expressed concern that this was some sort of \"equipment issue\" but I see no evidence of that in the record              Santos Andersen MD  2/12/2024    08:56 EST           "

## 2024-02-12 NOTE — CONSULTS
Patient with a longstanding history of diabetes with non-adherence to his prescribed regimen. Patient was previously on an insulin pump filled with humulin R U-500 prescribed by Dr. Cunningham, but patient experienced multiple episodes of hypoglycemia and AMS. The insulin pump was discontinued and the patient was prescribed Mounjaro and Xigduo. Patient was unsure of the medication names or doses so the information was pulled from his PCP records. Patient intends to follow up with Dr. Cunningham upon discharge.

## 2024-02-12 NOTE — PROGRESS NOTES
[facility]   Hospitalist Progress Note  Date: 2024  Patient Name: Fantasma Mcintosh  : 1965  MRN: [id number]  Date of admission: 2024  Consultants:   -Cardiology: Dr. Grey Andersen, Dr. Vladimir Adams    Subjective   Subjective     Chief Complaint: Edema, palpitations    Summary:   Fantasma Mcintosh is a 59 y.o. male with essential pretension, anxiety, hypothyroidism, hyperlipidemia, chronic paroxysmal atrial fibrillation that presented with lower extremity edema and palpitations.  Eval in ED significant for patient being in A-fib with RVR and labs showing elevated proBNP and elevated creatinine.  Testing did show patient to be positive for influenza A.  Hospitalist service contacted for further evaluation management.  Treatment for influenza A with Tamiflu started.  Cardiology consulted.  Patient started on diltiazem drip.  Patient started on sotalol and home beta-blocker therapy per cardiology recommendation.    Interval Followup:   No acute issues overnight.  Patient remains on diltiazem drip.  Heart rate does remain elevated.  Denies any chest pain or shortness of breath.  Nursing with no additional acute issues to report.    Objective   Objective     Vitals:   Temp:  [97.2 °F (36.2 °C)-98.1 °F (36.7 °C)] 98.1 °F (36.7 °C)  Heart Rate:  [] 111  Resp:  [18] 18  BP: ()/(70-95) 104/87  Flow (L/min):  [4] 4  Physical Exam   Gen: No acute distress, conversant, sitting up in bed  Resp: CTAB, good aeration, equal chest rise bilaterally, normal respiratory effort  Card: IRIR, No m/r/g  Abd: Soft, Nontender, Nondistended, + bowel sounds    Result Review    Result Review:  I have personally reviewed the results as below and agree with these findings:  []  Laboratory:   CMP          2/10/2024    04:19 2024    04:06 2024    04:19   CMP   Glucose 63  96  200    BUN 20  17  18    Creatinine 1.13  0.94  0.91    EGFR 75.3  94.0  97.7    Sodium 133  137  136    Potassium 3.4  3.5  3.5    Chloride 92  94   92    Calcium 8.6  8.6  8.6    BUN/Creatinine Ratio 17.7  18.1  19.8    Anion Gap 8.4  10.2  12.5      CBC          2/10/2024    04:19 2/11/2024    04:06 2/12/2024    04:19   CBC   WBC 6.08  6.04  6.79    RBC 4.56  4.61  4.48    Hemoglobin 12.3  12.4  12.2    Hematocrit 38.4  38.9  37.6    MCV 84.2  84.4  83.9    MCH 27.0  26.9  27.2    MCHC 32.0  31.9  32.4    RDW 13.5  13.6  13.3    Platelets 82  97  110    Magnesium low.  Phosphorus within normal limits.  []  Microbiology:   []  Radiology:   [x]  EKG/Telemetry:    []  Cardiology/Vascular:    []  Pathology:  []  Old records:  []  Other:    Assessment & Plan   Assessment / Plan     Assessment:  Chronic paroxysmal atrial fibrillation acutely with RVR  Influenza A infection  Acute renal failure (baseline creatinine appears to be 0.7-0.8 and creatinine on admission 1.47)  Acute on chronic heart failure with reduced ejection fraction  Type 2 diabetes mellitus, new diagnosis  Hypothyroidism  Hypokalemia, recurrent  Hypomagnesemia, recurrent  Essential hypertension  Anxiety  Hyperlipidemia  Hyponatremia, not clinically significant  Chronic ongoing tobacco abuse with cigarettes    Plan:  -Cardiology consulted and following, appreciate assistance and recommendations in the care of this patient.  -Continue diltiazem drip, wean as tolerated  -Continue sotalol, carvedilol and oral diltiazem  -Continue Xarelto  -Management discussed with cardiology.  Patient would likely benefit from elective cardioversion prior to discharge.  Plan on possibly pursuing this first thing in the morning of 02/14/2024.  -Continue Tamiflu for influenza A (day 4/5  -Continue diuresis with Lasix 40 mg IV daily.  Monitor electrolytes and renal function closely given IV diuresis.  -Replace potassium and magnesium  -No change to insulin regimen  -Diabetes nurse educator consulted  -Continue appropriate home medications  -Monitor electrolytes and renal function with BMP and magnesium level in the  AM  -Monitor WBC and Hgb with CBC in the AM  -Clinical course will dictate further management     DVT Prophylaxis: Xarelto  Diet: Diabetic  Dispo: Home when medically appropriate for discharge  Code Status: Full code     Personally reviewed patients labs and imaging, discussed with patient and nurse at bedside. Discussed management with the following consultants: Cardiology.     Part of this note may be an electronic transcription/translation of spoken language to printed text using the Dragon dictation system.    DVT prophylaxis:  Medical DVT prophylaxis orders are present.      CODE STATUS:   Code Status (Patient has no pulse and is not breathing): CPR (Attempt to Resuscitate)  Medical Interventions (Patient has pulse or is breathing): Full Support      Electronically signed by Kaleb Pérez MD, 02/12/24, 9:37 AM EST.

## 2024-02-13 LAB
ANION GAP SERPL CALCULATED.3IONS-SCNC: 10.6 MMOL/L (ref 5–15)
BUN SERPL-MCNC: 15 MG/DL (ref 6–20)
BUN/CREAT SERPL: 17.4 (ref 7–25)
CALCIUM SPEC-SCNC: 8.7 MG/DL (ref 8.6–10.5)
CHLORIDE SERPL-SCNC: 94 MMOL/L (ref 98–107)
CO2 SERPL-SCNC: 32.4 MMOL/L (ref 22–29)
CREAT SERPL-MCNC: 0.86 MG/DL (ref 0.76–1.27)
DEPRECATED RDW RBC AUTO: 40.6 FL (ref 37–54)
EGFRCR SERPLBLD CKD-EPI 2021: 100.4 ML/MIN/1.73
ERYTHROCYTE [DISTWIDTH] IN BLOOD BY AUTOMATED COUNT: 13.2 % (ref 12.3–15.4)
GLUCOSE BLDC GLUCOMTR-MCNC: 161 MG/DL (ref 70–99)
GLUCOSE BLDC GLUCOMTR-MCNC: 166 MG/DL (ref 70–99)
GLUCOSE BLDC GLUCOMTR-MCNC: 185 MG/DL (ref 70–99)
GLUCOSE BLDC GLUCOMTR-MCNC: 209 MG/DL (ref 70–99)
GLUCOSE SERPL-MCNC: 163 MG/DL (ref 65–99)
HCT VFR BLD AUTO: 39.5 % (ref 37.5–51)
HGB BLD-MCNC: 12.4 G/DL (ref 13–17.7)
MAGNESIUM SERPL-MCNC: 1.9 MG/DL (ref 1.6–2.6)
MCH RBC QN AUTO: 26.2 PG (ref 26.6–33)
MCHC RBC AUTO-ENTMCNC: 31.4 G/DL (ref 31.5–35.7)
MCV RBC AUTO: 83.5 FL (ref 79–97)
PLATELET # BLD AUTO: 144 10*3/MM3 (ref 140–450)
PMV BLD AUTO: 11.9 FL (ref 6–12)
POTASSIUM SERPL-SCNC: 3.6 MMOL/L (ref 3.5–5.2)
RBC # BLD AUTO: 4.73 10*6/MM3 (ref 4.14–5.8)
SODIUM SERPL-SCNC: 137 MMOL/L (ref 136–145)
WBC NRBC COR # BLD AUTO: 6.94 10*3/MM3 (ref 3.4–10.8)

## 2024-02-13 PROCEDURE — 97165 OT EVAL LOW COMPLEX 30 MIN: CPT

## 2024-02-13 PROCEDURE — 99233 SBSQ HOSP IP/OBS HIGH 50: CPT | Performed by: INTERNAL MEDICINE

## 2024-02-13 PROCEDURE — 80048 BASIC METABOLIC PNL TOTAL CA: CPT | Performed by: INTERNAL MEDICINE

## 2024-02-13 PROCEDURE — 25810000003 SODIUM CHLORIDE 0.9 % SOLUTION

## 2024-02-13 PROCEDURE — 82948 REAGENT STRIP/BLOOD GLUCOSE: CPT

## 2024-02-13 PROCEDURE — 97161 PT EVAL LOW COMPLEX 20 MIN: CPT

## 2024-02-13 PROCEDURE — 82948 REAGENT STRIP/BLOOD GLUCOSE: CPT | Performed by: STUDENT IN AN ORGANIZED HEALTH CARE EDUCATION/TRAINING PROGRAM

## 2024-02-13 PROCEDURE — 63710000001 INSULIN LISPRO (HUMAN) PER 5 UNITS: Performed by: STUDENT IN AN ORGANIZED HEALTH CARE EDUCATION/TRAINING PROGRAM

## 2024-02-13 PROCEDURE — 85027 COMPLETE CBC AUTOMATED: CPT | Performed by: INTERNAL MEDICINE

## 2024-02-13 PROCEDURE — 83735 ASSAY OF MAGNESIUM: CPT | Performed by: INTERNAL MEDICINE

## 2024-02-13 PROCEDURE — 25010000002 FUROSEMIDE PER 20 MG: Performed by: INTERNAL MEDICINE

## 2024-02-13 RX ORDER — HYDROCODONE BITARTRATE AND ACETAMINOPHEN 5; 325 MG/1; MG/1
1 TABLET ORAL EVERY 6 HOURS PRN
Status: DISCONTINUED | OUTPATIENT
Start: 2024-02-13 | End: 2024-02-15 | Stop reason: HOSPADM

## 2024-02-13 RX ORDER — POTASSIUM CHLORIDE 750 MG/1
40 CAPSULE, EXTENDED RELEASE ORAL ONCE
Status: COMPLETED | OUTPATIENT
Start: 2024-02-13 | End: 2024-02-13

## 2024-02-13 RX ORDER — HYDROCODONE BITARTRATE AND ACETAMINOPHEN 7.5; 325 MG/1; MG/1
1 TABLET ORAL EVERY 6 HOURS PRN
Status: DISCONTINUED | OUTPATIENT
Start: 2024-02-13 | End: 2024-02-15 | Stop reason: HOSPADM

## 2024-02-13 RX ADMIN — ESCITALOPRAM OXALATE 20 MG: 10 TABLET ORAL at 08:32

## 2024-02-13 RX ADMIN — Medication 10 ML: at 20:37

## 2024-02-13 RX ADMIN — CARVEDILOL 25 MG: 25 TABLET, FILM COATED ORAL at 17:51

## 2024-02-13 RX ADMIN — INSULIN LISPRO 2 UNITS: 100 INJECTION, SOLUTION INTRAVENOUS; SUBCUTANEOUS at 08:32

## 2024-02-13 RX ADMIN — CARVEDILOL 25 MG: 25 TABLET, FILM COATED ORAL at 08:32

## 2024-02-13 RX ADMIN — SOTALOL HYDROCHLORIDE 80 MG: 80 TABLET ORAL at 20:37

## 2024-02-13 RX ADMIN — FUROSEMIDE 40 MG: 10 INJECTION, SOLUTION INTRAMUSCULAR; INTRAVENOUS at 08:32

## 2024-02-13 RX ADMIN — DILTIAZEM HYDROCHLORIDE 180 MG: 180 CAPSULE, COATED, EXTENDED RELEASE ORAL at 08:38

## 2024-02-13 RX ADMIN — INSULIN LISPRO 2 UNITS: 100 INJECTION, SOLUTION INTRAVENOUS; SUBCUTANEOUS at 17:28

## 2024-02-13 RX ADMIN — RIVAROXABAN 20 MG: 20 TABLET, FILM COATED ORAL at 08:38

## 2024-02-13 RX ADMIN — SODIUM CHLORIDE 250 ML: 9 INJECTION, SOLUTION INTRAVENOUS at 19:52

## 2024-02-13 RX ADMIN — LEVOTHYROXINE SODIUM 25 MCG: 25 TABLET ORAL at 05:41

## 2024-02-13 RX ADMIN — INSULIN LISPRO 2 UNITS: 100 INJECTION, SOLUTION INTRAVENOUS; SUBCUTANEOUS at 20:37

## 2024-02-13 RX ADMIN — Medication 10 ML: at 08:33

## 2024-02-13 RX ADMIN — ASPIRIN 81 MG: 81 TABLET, COATED ORAL at 08:32

## 2024-02-13 RX ADMIN — POTASSIUM CHLORIDE 40 MEQ: 10 CAPSULE, COATED, EXTENDED RELEASE ORAL at 09:12

## 2024-02-13 RX ADMIN — CETIRIZINE HYDROCHLORIDE 10 MG: 10 TABLET, FILM COATED ORAL at 08:32

## 2024-02-13 RX ADMIN — SOTALOL HYDROCHLORIDE 80 MG: 80 TABLET ORAL at 08:38

## 2024-02-13 RX ADMIN — OSELTAMIVIR PHOSPHATE 75 MG: 75 CAPSULE ORAL at 20:37

## 2024-02-13 RX ADMIN — INSULIN LISPRO 3 UNITS: 100 INJECTION, SOLUTION INTRAVENOUS; SUBCUTANEOUS at 12:06

## 2024-02-13 RX ADMIN — ROSUVASTATIN CALCIUM 20 MG: 20 TABLET, FILM COATED ORAL at 20:37

## 2024-02-13 RX ADMIN — OSELTAMIVIR PHOSPHATE 75 MG: 75 CAPSULE ORAL at 08:39

## 2024-02-13 NOTE — THERAPY EVALUATION
Patient Name: Fantasma Mcintosh  : 1965    MRN: [id number]                              Today's Date: 2024       Admit Date: 2024    Visit Dx:     ICD-10-CM ICD-9-CM   1. Atrial fibrillation with rapid ventricular response  I48.91 427.31   2. Influenza A  J10.1 487.1   3. Acute pulmonary edema  J81.0 518.4   4. Pedal edema  R60.0 782.3   5. Difficulty walking  R26.2 719.7   6. Decreased activities of daily living (ADL)  Z78.9 V49.89     Patient Active Problem List   Diagnosis    Atrial fibrillation with RVR    Influenza A     Past Medical History:   Diagnosis Date    Diabetes mellitus     Disease of thyroid gland      History reviewed. No pertinent surgical history.   General Information       Row Name 24 1237          OT Time and Intention    Document Type evaluation  -     Mode of Treatment individual therapy;occupational therapy  -       Row Name 24 1237          General Information    Patient Profile Reviewed yes  -     Prior Level of Function --  (I) with ADLs, sponge bathes recently d/t weakness, has a step over tub, standard commode, stands to groom, drives, and no home O2.  -     Existing Precautions/Restrictions fall  -     Barriers to Rehab none identified  -       Row Name 24 1237          Occupational Profile    Reason for Services/Referral (Occupational Profile) Patient is a 59 year old male admitted to [facility] for edema and irregular heartbeat on 2024. Occupational therapy consulted due to recent decline in ADLs/functional transfers. No previous occupational therapy services for current condition.  -       Row Name 24 1237          Living Environment    People in Home parent(s)  -[facility] Name 24 1237          Home Main Entrance    Number of Stairs, Main Entrance three  -       Row Name 24 1237          Cognition    Orientation Status (Cognition) oriented x 4  -[facility] Name 24 1237          Safety  Issues, Functional Mobility    Safety Issues Affecting Function (Mobility) impulsivity;insight into deficits/self-awareness;awareness of need for assistance  -     Impairments Affecting Function (Mobility) balance;endurance/activity tolerance  -               User Key  (r) = Recorded By, (t) = Taken By, (c) = Cosigned By      Initials Name Provider Type     Dionne More OT Occupational Therapist                     Mobility/ADL's       Row Name 02/13/24 1240          Bed Mobility    Bed Mobility supine-sit  -     Supine-Sit [place] (Bed Mobility) supervision  -[facility] Name 02/13/24 1240          Transfers    Transfers sit-stand transfer;stand-sit transfer  -[facility] Name 02/13/24 1240          Sit-Stand Transfer    Sit-Stand [place] (Transfers) standby assist  -[facility] Name 02/13/24 1240          Stand-Sit Transfer    Stand-Sit [place] (Transfers) standby assist  -LF       Row Name 02/13/24 1240          Activities of Daily Living    BADL Assessment/Intervention bathing;upper body dressing;lower body dressing;grooming;feeding;toileting  -[facility] Name 02/13/24 1240          Bathing Assessment/Intervention    [place] Level (Bathing) bathing skills;upper body;set up;lower body;minimum assist (75% patient effort)  -[facility] Name 02/13/24 1240          Upper Body Dressing Assessment/Training    [place] Level (Upper Body Dressing) upper body dressing skills;set up  -[facility] Name 02/13/24 1240          Lower Body Dressing Assessment/Training    [place] Level (Lower Body Dressing) lower body dressing skills;minimum assist (75% patient effort)  -LF       Row Name 02/13/24 1240          Grooming Assessment/Training    [place] Level (Grooming) grooming skills;set up  -[facility] Name 02/13/24 1240          Self-Feeding Assessment/Training    [place] Level (Feeding) feeding skills;set up  -[facility] Name 02/13/24 1240          Toileting  Assessment/Training    [place] Level (Toileting) toileting skills;standby assist  -LF               User Key  (r) = Recorded By, (t) = Taken By, (c) = Cosigned By      Initials Name Provider Type     Dionne More OT Occupational Therapist                   Obj/Interventions       [facility] Name 02/13/24 1240          Sensory Assessment (Somatosensory)    Sensory Assessment (Somatosensory) UE sensation intact  -LF       Row Name 02/13/24 1240          Vision Assessment/Intervention    Visual Impairment/Limitations WFL  -[facility] Name 02/13/24 1240          Range of Motion Comprehensive    General Range of Motion bilateral upper extremity ROM WFL  -LF       Row Name 02/13/24 1240          Strength Comprehensive (MMT)    Comment, General Manual Muscle Testing (MMT) Assessment 4+/5 BUEs  -[facility] Name 02/13/24 1240          Motor Skills    Motor Skills coordination;functional endurance  -LF     Coordination bilateral;upper extremity;WFL  -     Functional Endurance Fair-/fair  -LF       Row Name 02/13/24 1240          Balance    Balance Assessment sitting dynamic balance;standing dynamic balance  -LF     Dynamic Sitting Balance supervision  -LF     Position, Sitting Balance unsupported;sitting edge of bed  -LF     Dynamic Standing Balance standby assist  -LF     Position/Device Used, Standing Balance supported  -LF               User Key  (r) = Recorded By, (t) = Taken By, (c) = Cosigned By      Initials Name Provider Type    LF Dionne More OT Occupational Therapist                   Goals/Plan       Row Name 02/13/24 1241          Bed Mobility Goal 1 (OT)    Activity/Assistive Device (Bed Mobility Goal 1, OT) bed mobility activities, all  -LF     [place] Level/Cues Needed (Bed Mobility Goal 1, OT) modified independence  -LF     Time Frame (Bed Mobility Goal 1, OT) long term goal (LTG);10 days  -[facility] Name 02/13/24 1241          Transfer Goal 1 (OT)    Activity/Assistive Device  (Transfer Goal 1, OT) transfers, all  -LF     [place] Level/Cues Needed (Transfer Goal 1, OT) modified independence  -LF     Time Frame (Transfer Goal 1, OT) long term goal (LTG);10 days  -       Row Name 02/13/24 1241          Bathing Goal 1 (OT)    Activity/Device (Bathing Goal 1, OT) bathing skills, all  -LF     [place] Level/Cues Needed (Bathing Goal 1, OT) modified independence  -LF     Time Frame (Bathing Goal 1, OT) long term goal (LTG);10 days  -       Row Name 02/13/24 1241          Dressing Goal 1 (OT)    Activity/Device (Dressing Goal 1, OT) dressing skills, all  -LF     [place]/Cues Needed (Dressing Goal 1, OT) modified independence  -LF     Time Frame (Dressing Goal 1, OT) long term goal (LTG);10 days  -       Row Name 02/13/24 1241          Toileting Goal 1 (OT)    Activity/Device (Toileting Goal 1, OT) toileting skills, all  -LF     [place] Level/Cues Needed (Toileting Goal 1, OT) modified independence  -LF     Time Frame (Toileting Goal 1, OT) long term goal (LTG);10 days  -       Row Name 02/13/24 1241          Problem Specific Goal 1 (OT)    Problem Specific Goal 1 (OT) Patient will demonstrate good endurance to support ADLs/functional transfers.  -LF     Time Frame (Problem Specific Goal 1, OT) long term goal (LTG);10 days  -[facility] Name 02/13/24 1241          Therapy Assessment/Plan (OT)    Planned Therapy Interventions (OT) activity tolerance training;BADL retraining;functional balance retraining;occupation/activity based interventions;patient/caregiver education/training;strengthening exercise;transfer/mobility retraining  -               User Key  (r) = Recorded By, (t) = Taken By, (c) = Cosigned By      Initials Name Provider Type     Dionne More OT Occupational Therapist                   Clinical Impression       Row Name 02/13/24 1241          Pain Assessment    Additional Documentation Pain Scale: FACES Pre/Post-Treatment (Group)  -[facility]  Name 02/13/24 1241          Pain Scale: FACES Pre/Post-Treatment    Pain: FACES Scale, Pretreatment 0-->no hurt  -LF     Posttreatment Pain Rating 0-->no hurt  -LF       Row Name 02/13/24 1241          Plan of Care Review    Plan of Care Reviewed With patient;parent  -     Progress no change  -     Outcome Evaluation Patient presents with limitations in self-care, functional transfers, balance, and endurance. He would benefit from continued skilled occupational therapy services to maximize independence with ADLs/functional transfers.  -       Row Name 02/13/24 1241          Therapy Assessment/Plan (OT)    Patient/Family Therapy Goal Statement (OT) To maximize independence.  -     Rehab Potential (OT) good, to achieve stated therapy goals  -     Criteria for Skilled Therapeutic Interventions Met (OT) yes;meets criteria;skilled treatment is necessary  -     Therapy Frequency (OT) 5 times/wk  -       Row Name 02/13/24 1241          Therapy Plan Review/Discharge Plan (OT)    Anticipated Discharge Disposition (OT) home with home health;home with assist  -       Row Name 02/13/24 1241          Vital Signs    O2 Delivery Pre Treatment nasal cannula  -     O2 Delivery Intra Treatment nasal cannula  -     O2 Delivery Post Treatment nasal cannula  -       Row Name 02/13/24 1241          Positioning and Restraints    Pre-Treatment Position in bed  -     Post Treatment Position chair  -     In Chair reclined;call light within reach;encouraged to call for assist;exit alarm on;with family/caregiver  -               User Key  (r) = Recorded By, (t) = Taken By, (c) = Cosigned By      Initials Name Provider Type     Dionne More, OT Occupational Therapist                   Outcome Measures       Row Name 02/13/24 1241          How much help from another is currently needed...    Putting on and taking off regular lower body clothing? 3  -LF     Bathing (including washing, rinsing, and drying) 3  -LF      Toileting (which includes using toilet bed pan or urinal) 3  -LF     Putting on and taking off regular upper body clothing 4  -LF     Taking care of personal grooming (such as brushing teeth) 4  -LF     Eating meals 4  -LF     AM-PAC 6 Clicks Score (OT) 21  -LF       Row Name 02/13/24 1100          How much help from another person do you currently need...    Turning from your back to your side while in flat bed without using bedrails? 4  -DP     Moving from lying on back to sitting on the side of a flat bed without bedrails? 4  -DP     Moving to and from a bed to a chair (including a wheelchair)? 4  -DP     Standing up from a chair using your arms (e.g., wheelchair, bedside chair)? 4  -DP     Climbing 3-5 steps with a railing? 3  -DP     To walk in hospital room? 3  -DP     AM-PAC 6 Clicks Score (PT) 22  -DP     Highest Level of Mobility Goal 7 --> Walk 25 feet or more  -DP       Row Name 02/13/24 1241 02/13/24 1100       Functional Assessment    Outcome Measure Options AM-PAC 6 Clicks Daily Activity (OT);Optimal Instrument  -LF AM-PAC 6 Clicks Basic Mobility (PT)  -DP      Row Name 02/13/24 1241          Optimal Instrument    Optimal Instrument Optimal - 3  -LF     Bending/Stooping 2  -LF     Standing 2  -LF     Reaching 1  -LF     From the list, choose the 3 activities you would most like to be able to do without any difficulty Bending/stooping;Standing;Reaching  -LF     Total Score Optimal - 3 5  -LF               User Key  (r) = Recorded By, (t) = Taken By, (c) = Cosigned By      Initials Name Provider Type    LF Dionne More, OT Occupational Therapist    Oliverio Nunes, PT Physical Therapist                    Occupational Therapy Education       Title: PT OT SLP Therapies (In Progress)       Topic: Occupational Therapy (In Progress)       Point: ADL training (In Progress)       Description:   Instruct learner(s) on proper safety adaptation and remediation techniques during self care or transfers.    Instruct in proper use of assistive devices.                  Learning Progress Summary             Patient Acceptance, E,TB, NR by  at 2/13/2024 1242   Family Acceptance, E,TB, NR by  at 2/13/2024 1242                         Point: Precautions (In Progress)       Description:   Instruct learner(s) on prescribed precautions during self-care and functional transfers.                  Learning Progress Summary             Patient Acceptance, E,TB, NR by  at 2/13/2024 1242   Family Acceptance, E,TB, NR by  at 2/13/2024 1242                         Point: Body mechanics (In Progress)       Description:   Instruct learner(s) on proper positioning and spine alignment during self-care, functional mobility activities and/or exercises.                  Learning Progress Summary             Patient Acceptance, E,TB, NR by  at 2/13/2024 1242   Family Acceptance, E,TB, NR by  at 2/13/2024 1242                                         User Key       Initials Effective Dates Name Provider Type Discipline     06/16/21 -  Dionne More OT Occupational Therapist OT                  OT Recommendation and Plan  Planned Therapy Interventions (OT): activity tolerance training, BADL retraining, functional balance retraining, occupation/activity based interventions, patient/caregiver education/training, strengthening exercise, transfer/mobility retraining  Therapy Frequency (OT): 5 times/wk  Plan of Care Review  Plan of Care Reviewed With: patient, parent  Progress: no change  Outcome Evaluation: Patient presents with limitations in self-care, functional transfers, balance, and endurance. He would benefit from continued skilled occupational therapy services to maximize independence with ADLs/functional transfers.     Time Calculation:   Evaluation Complexity (OT)  Review Occupational Profile/Medical/Therapy History Complexity: brief/low complexity  Assessment, Occupational Performance/Identification of Deficit  Complexity: 3-5 performance deficits  Clinical Decision Making Complexity (OT): problem focused assessment/low complexity  Overall Complexity of Evaluation (OT): low complexity     Time Calculation- OT       Row Name 02/13/24 1242             Time Calculation- OT    OT Received On 02/13/24  -LF      OT Goal Re-Cert Due Date 02/22/24  -LF         Untimed Charges    OT Eval/Re-eval Minutes 35  -LF         Total Minutes    Untimed Charges Total Minutes 35  -LF       Total Minutes 35  -LF                User Key  (r) = Recorded By, (t) = Taken By, (c) = Cosigned By      Initials Name Provider Type    LF Dionne More OT Occupational Therapist                  Therapy Charges for Today       Code Description Service Date Service Provider Modifiers Qty    87499008290 HC OT EVAL LOW COMPLEXITY 3 2/13/2024 Dionne More OT GO 1                 Dionne More OT  2/13/2024

## 2024-02-13 NOTE — PLAN OF CARE
Goal Outcome Evaluation:  Plan of Care Reviewed With: patient           Outcome Evaluation: Pt presents with decreased strength, transfers and functional mobility. Will benefit from inpatient PT services and continued PT services upon discharge.      Anticipated Discharge Disposition (PT): home with home health

## 2024-02-13 NOTE — H&P (VIEW-ONLY)
CARDIOLOGY  INPATIENT PROGRESS NOTE         [facility] 5TH FLOOR SURGICAL TELEMETRY UNIT    2/13/2024      PATIENT IDENTIFICATION:   Name:  Fantasma Mcintosh      MRN:  [id number]     59 y.o.  male             Reason for visit: Influenza A, shortness of breath, persistent atrial fibrillation      SUBJECTIVE:    The patient is clinically stable, at rest his heart rate currently is 129.  He remains in atrial fibrillation.  OBJECTIVE:  Vitals:    02/13/24 1100 02/13/24 1200 02/13/24 1216 02/13/24 1300   BP:   112/90    BP Location:   Left arm    Patient Position:   Sitting    Pulse: 106 102 94 89   Resp:   18    Temp:   97.7 °F (36.5 °C)    TempSrc:   Oral    SpO2: 95% 96% 96% 96%   Weight:       Height:               Body mass index is 28.03 kg/m².    Intake/Output Summary (Last 24 hours) at 2/13/2024 1512  Last data filed at 2/13/2024 1300  Gross per 24 hour   Intake 540 ml   Output 1350 ml   Net -810 ml       Telemetry: Atrial fibrillation with mildly rapid rates    Review of Systems   Respiratory:  Positive for shortness of breath. Negative for chest tightness.    Cardiovascular:  Negative for chest pain and palpitations.         Exam:  General appearance no acute distress    well nourished   HEENT sclerae non-icteric    lips not cyanotic   Respiratory rate and depth normal    normal breath sounds    no rales, no wheeze   Cardiovascular JVP normal    Irregularly irregular rhythm    S1 normal, S2 normal    no S3, no S4    no murmur    lower extremity edema:none   Abdominal bowel sounds normal    abdomen soft, non-tender    no hepatosplenomegaly   Neuro-psych oriented to person, place, time    cooperative     Allergies   Allergen Reactions    Penicillins Itching     Scheduled meds:  aspirin, 81 mg, Oral, Daily  carvedilol, 25 mg, Oral, BID With Meals  cetirizine, 10 mg, Oral, Daily  dilTIAZem CD, 180 mg, Oral, Q24H  escitalopram, 20 mg, Oral, Daily  furosemide, 40 mg, Intravenous, Daily  insulin lispro, 2-7  "Units, Subcutaneous, 4x Daily AC & at Bedtime  levothyroxine, 25 mcg, Oral, Q AM  nicotine, 1 patch, Transdermal, Q24H  oseltamivir, 75 mg, Oral, Q12H  Pharmacy Consult, , Does not apply, Once  rivaroxaban, 20 mg, Oral, Daily  rosuvastatin, 20 mg, Oral, Nightly  sodium chloride, 1,000 mL, Intravenous, Once  sodium chloride, 10 mL, Intravenous, Q12H  sotalol, 80 mg, Oral, Q12H      IV meds:                      dilTIAZem, 5-15 mg/hr, Last Rate: 2.5 mg/hr (02/12/24 0121)      Data Review:  Results from last 7 days   Lab Units 02/13/24  0310 02/12/24  0419 02/11/24  0406 02/10/24  0419   SODIUM mmol/L 137 136 137 133*   BUN mg/dL 15 18 17 20   CREATININE mg/dL 0.86 0.91 0.94 1.13   GLUCOSE mg/dL 163* 200* 96 63*             Estimated Creatinine Clearance: 137.7 mL/min (by C-G formula based on SCr of 0.86 mg/dL).  Results from last 7 days   Lab Units 02/13/24  0310 02/12/24  0419 02/11/24  0406 02/10/24  0419 02/09/24  0641   WBC 10*3/mm3 6.94 6.79 6.04 6.08 7.21   HEMOGLOBIN g/dL 12.4* 12.2* 12.4* 12.3* 12.2*     Results from last 7 days   Lab Units 02/09/24  0215   INR  2.24*     Results from last 7 days   Lab Units 02/09/24  0042   ALT (SGPT) U/L 7   AST (SGOT) U/L 18     No results found for: \"DIGOXIN\"   Lab Results   Component Value Date    TSH 0.936 06/06/2023           Invalid input(s): \"LDLCALC\"            Imaging (last 24 hr):   Imaging Results (Last 24 Hours)       ** No results found for the last 24 hours. **              ASSESSMENT:     Atrial fibrillation with RVR    Influenza A        PLAN:  1.  EKG today showed atrial fibrillation, QTc stable, continue sotalol and anticoagulation along with rate control.  2.  His rate control is inadequate, I recommend cardioversion to try to restore sinus rhythm.  He has been consistently on anticoagulation.  He has been through cardioversion previously.  The risk and benefits were discussed with the patient.  He is agreeable to proceed.  Will schedule this for tomorrow " morning              Santos Andersen MD  2/13/2024    15:12 EST

## 2024-02-13 NOTE — PLAN OF CARE
Goal Outcome Evaluation:  Plan of Care Reviewed With: patient, parent        Progress: no change  Outcome Evaluation: Patient presents with limitations in self-care, functional transfers, balance, and endurance. He would benefit from continued skilled occupational therapy services to maximize independence with ADLs/functional transfers.      Anticipated Discharge Disposition (OT): home with home health, home with assist

## 2024-02-13 NOTE — PROGRESS NOTES
CARDIOLOGY  INPATIENT PROGRESS NOTE         [facility] 5TH FLOOR SURGICAL TELEMETRY UNIT    2/13/2024      PATIENT IDENTIFICATION:   Name:  Fantasma Mcintosh      MRN:  [id number]     59 y.o.  male             Reason for visit: Influenza A, shortness of breath, persistent atrial fibrillation      SUBJECTIVE:    The patient is clinically stable, at rest his heart rate currently is 129.  He remains in atrial fibrillation.  OBJECTIVE:  Vitals:    02/13/24 1100 02/13/24 1200 02/13/24 1216 02/13/24 1300   BP:   112/90    BP Location:   Left arm    Patient Position:   Sitting    Pulse: 106 102 94 89   Resp:   18    Temp:   97.7 °F (36.5 °C)    TempSrc:   Oral    SpO2: 95% 96% 96% 96%   Weight:       Height:               Body mass index is 28.03 kg/m².    Intake/Output Summary (Last 24 hours) at 2/13/2024 1512  Last data filed at 2/13/2024 1300  Gross per 24 hour   Intake 540 ml   Output 1350 ml   Net -810 ml       Telemetry: Atrial fibrillation with mildly rapid rates    Review of Systems   Respiratory:  Positive for shortness of breath. Negative for chest tightness.    Cardiovascular:  Negative for chest pain and palpitations.         Exam:  General appearance no acute distress    well nourished   HEENT sclerae non-icteric    lips not cyanotic   Respiratory rate and depth normal    normal breath sounds    no rales, no wheeze   Cardiovascular JVP normal    Irregularly irregular rhythm    S1 normal, S2 normal    no S3, no S4    no murmur    lower extremity edema:none   Abdominal bowel sounds normal    abdomen soft, non-tender    no hepatosplenomegaly   Neuro-psych oriented to person, place, time    cooperative     Allergies   Allergen Reactions    Penicillins Itching     Scheduled meds:  aspirin, 81 mg, Oral, Daily  carvedilol, 25 mg, Oral, BID With Meals  cetirizine, 10 mg, Oral, Daily  dilTIAZem CD, 180 mg, Oral, Q24H  escitalopram, 20 mg, Oral, Daily  furosemide, 40 mg, Intravenous, Daily  insulin lispro, 2-7  "Units, Subcutaneous, 4x Daily AC & at Bedtime  levothyroxine, 25 mcg, Oral, Q AM  nicotine, 1 patch, Transdermal, Q24H  oseltamivir, 75 mg, Oral, Q12H  Pharmacy Consult, , Does not apply, Once  rivaroxaban, 20 mg, Oral, Daily  rosuvastatin, 20 mg, Oral, Nightly  sodium chloride, 1,000 mL, Intravenous, Once  sodium chloride, 10 mL, Intravenous, Q12H  sotalol, 80 mg, Oral, Q12H      IV meds:                      dilTIAZem, 5-15 mg/hr, Last Rate: 2.5 mg/hr (02/12/24 0121)      Data Review:  Results from last 7 days   Lab Units 02/13/24  0310 02/12/24  0419 02/11/24  0406 02/10/24  0419   SODIUM mmol/L 137 136 137 133*   BUN mg/dL 15 18 17 20   CREATININE mg/dL 0.86 0.91 0.94 1.13   GLUCOSE mg/dL 163* 200* 96 63*             Estimated Creatinine Clearance: 137.7 mL/min (by C-G formula based on SCr of 0.86 mg/dL).  Results from last 7 days   Lab Units 02/13/24  0310 02/12/24  0419 02/11/24  0406 02/10/24  0419 02/09/24  0641   WBC 10*3/mm3 6.94 6.79 6.04 6.08 7.21   HEMOGLOBIN g/dL 12.4* 12.2* 12.4* 12.3* 12.2*     Results from last 7 days   Lab Units 02/09/24  0215   INR  2.24*     Results from last 7 days   Lab Units 02/09/24  0042   ALT (SGPT) U/L 7   AST (SGOT) U/L 18     No results found for: \"DIGOXIN\"   Lab Results   Component Value Date    TSH 0.936 06/06/2023           Invalid input(s): \"LDLCALC\"            Imaging (last 24 hr):   Imaging Results (Last 24 Hours)       ** No results found for the last 24 hours. **              ASSESSMENT:     Atrial fibrillation with RVR    Influenza A        PLAN:  1.  EKG today showed atrial fibrillation, QTc stable, continue sotalol and anticoagulation along with rate control.  2.  His rate control is inadequate, I recommend cardioversion to try to restore sinus rhythm.  He has been consistently on anticoagulation.  He has been through cardioversion previously.  The risk and benefits were discussed with the patient.  He is agreeable to proceed.  Will schedule this for tomorrow " morning              Santos Andersen MD  2/13/2024    15:12 EST

## 2024-02-13 NOTE — THERAPY EVALUATION
Acute Care - Physical Therapy Initial Evaluation   [place]     Patient Name: Fantasma Mcintosh  : 1965  MRN: [id number]  Today's Date: 2024      Visit Dx:     ICD-10-CM ICD-9-CM   1. Atrial fibrillation with rapid ventricular response  I48.91 427.31   2. Influenza A  J10.1 487.1   3. Acute pulmonary edema  J81.0 518.4   4. Pedal edema  R60.0 782.3   5. Difficulty walking  R26.2 719.7     Patient Active Problem List   Diagnosis    Atrial fibrillation with RVR    Influenza A     Past Medical History:   Diagnosis Date    Diabetes mellitus     Disease of thyroid gland      History reviewed. No pertinent surgical history.  PT Assessment (last 12 hours)       PT Evaluation and Treatment       Row Name 24 1100          Physical Therapy Time and Intention    Subjective Information no complaints  -DP     Document Type evaluation  -DP     Mode of Treatment individual therapy;physical therapy  -DP     Patient Effort good  -DP       Row Name 24 1100          General Information    Patient Profile Reviewed yes  -DP     Patient Observations alert;cooperative  -DP     Prior Level of Function independent:;gait;transfer;bed mobility;ADL's  -DP     Equipment Currently Used at Home none  -DP     Existing Precautions/Restrictions fall  -DP     Barriers to Rehab none identified  -DP       Row Name 24 1100          Cognition    Orientation Status (Cognition) oriented x 3  -DP       Row Name 24 1100          Range of Motion (ROM)    Range of Motion bilateral lower extremities;ROM is WFL  -DP       Row Name 24 1100          Strength Comprehensive (MMT)    General Manual Muscle Testing (MMT) Assessment lower extremity strength deficits identified  -DP     Comment, General Manual Muscle Testing (MMT) Assessment BLE: 4/5  -DP       Row Name 24 1100          Bed Mobility    Bed Mobility supine-sit-supine  -DP     Comment, (Bed Mobility) not tested- pt was up in the recliner  -DP       Row Name  02/13/24 1100          Transfers    Transfers sit-stand transfer  -DP       Row Name 02/13/24 1100          Sit-Stand Transfer    Sit-Stand Eddy (Transfers) standby assist  -DP       Row Name 02/13/24 1100          Gait/Stairs (Locomotion)    Gait/Stairs Locomotion gait/ambulation assistive device  -DP     Eddy Level (Gait) contact guard  -DP     Assistive Device (Gait) walker, front-wheeled  -DP     Patient was able to Ambulate yes  -DP     Distance in Feet (Gait) 16  -DP       Row Name 02/13/24 1100          Balance    Balance Assessment standing dynamic balance  -DP     Dynamic Standing Balance standby assist  -DP     Position/Device Used, Standing Balance supported  -DP       Row Name             Wound 02/10/24 0900 Right anterior hand    Wound - Properties Group Placement Date: 02/10/24  -DB Placement Time: 0900  -DB Side: Right  -DB Orientation: anterior  -DB, figer tips  Location: hand  -DB    Retired Wound - Properties Group Placement Date: 02/10/24  -DB Placement Time: 0900  -DB Side: Right  -DB Orientation: anterior  -DB, figer tips  Location: hand  -DB    Retired Wound - Properties Group Date first assessed: 02/10/24  -DB Time first assessed: 0900  -DB Side: Right  -DB Location: hand  -DB      Row Name 02/13/24 1100          Plan of Care Review    Plan of Care Reviewed With patient  -DP     Outcome Evaluation Pt presents with decreased strength, transfers and functional mobility. Will benefit from inpatient PT services and continued PT services upon discharge.  -DP       Row Name 02/13/24 1100          Therapy Assessment/Plan (PT)    Rehab Potential (PT) good, to achieve stated therapy goals  -DP     Criteria for Skilled Interventions Met (PT) yes;meets criteria  -DP     Therapy Frequency (PT) daily  -DP     Predicted Duration of Therapy Intervention (PT) 10 days  -DP     Problem List (PT) problems related to;mobility  -DP     Activity Limitations Related to Problem List (PT) unable to  transfer safely;unable to ambulate safely  -DP       Row Name 02/13/24 1100          PT Evaluation Complexity    History, PT Evaluation Complexity no personal factors and/or comorbidities  -DP     Examination of Body Systems (PT Eval Complexity) total of 4 or more elements  -DP     Clinical Presentation (PT Evaluation Complexity) stable  -DP     Clinical Decision Making (PT Evaluation Complexity) low complexity  -DP     Overall Complexity (PT Evaluation Complexity) low complexity  -DP       Row Name 02/13/24 1100          Physical Therapy Goals    Transfer Goal Selection (PT) transfer, PT goal 1  -DP     Gait Training Goal Selection (PT) gait training, PT goal 1  -DP       Row Name 02/13/24 1100          Transfer Goal 1 (PT)    Activity/Assistive Device (Transfer Goal 1, PT) sit-to-stand/stand-to-sit  -DP     [place] Level/Cues Needed (Transfer Goal 1, PT) independent  -DP     Time Frame (Transfer Goal 1, PT) 10 days  -DP       Row Name 02/13/24 1100          Gait Training Goal 1 (PT)    Activity/Assistive Device (Gait Training Goal 1, PT) assistive device use;walker, rolling  -DP     [place] Level (Gait Training Goal 1, PT) supervision required  -DP     Distance (Gait Training Goal 1, PT) 200  -DP     Time Frame (Gait Training Goal 1, PT) 10 days  -DP               User Key  (r) = Recorded By, (t) = Taken By, (c) = Cosigned By      Initials Name Provider Type    Oliverio Nunes, PT Physical Therapist    Nayeli Mcelroy, RN Registered Nurse                      PT Recommendation and Plan  Anticipated Discharge Disposition (PT): home with home health  Planned Therapy Interventions (PT): balance training, bed mobility training, gait training, strengthening, transfer training  Therapy Frequency (PT): daily  Plan of Care Reviewed With: patient  Outcome Evaluation: Pt presents with decreased strength, transfers and functional mobility. Will benefit from inpatient PT services and continued PT services upon  discharge.   Outcome Measures       Row Name 02/13/24 1100             How much help from another person do you currently need...    Turning from your back to your side while in flat bed without using bedrails? 4  -DP      Moving from lying on back to sitting on the side of a flat bed without bedrails? 4  -DP      Moving to and from a bed to a chair (including a wheelchair)? 4  -DP      Standing up from a chair using your arms (e.g., wheelchair, bedside chair)? 4  -DP      Climbing 3-5 steps with a railing? 3  -DP      To walk in hospital room? 3  -DP      AM-PAC 6 Clicks Score (PT) 22  -DP      Highest Level of Mobility Goal 7 --> Walk 25 feet or more  -DP         Functional Assessment    Outcome Measure Options AM-PAC 6 Clicks Basic Mobility (PT)  -DP                User Key  (r) = Recorded By, (t) = Taken By, (c) = Cosigned By      Initials Name Provider Type    Oliverio Nunes, PT Physical Therapist                     Time Calculation:    PT Charges       Row Name 02/13/24 1159             Time Calculation    PT Received On 02/13/24  -DP      PT Goal Re-Cert Due Date 02/22/24  -DP         Untimed Charges    PT Eval/Re-eval Minutes 30  -DP         Total Minutes    Untimed Charges Total Minutes 30  -DP       Total Minutes 30  -DP                User Key  (r) = Recorded By, (t) = Taken By, (c) = Cosigned By      Initials Name Provider Type    Oliverio Nunes, PT Physical Therapist                      PT G-Codes  Outcome Measure Options: AM-PAC 6 Clicks Basic Mobility (PT)  AM-PAC 6 Clicks Score (PT): 22    Oliverio Baez, PT  2/13/2024

## 2024-02-13 NOTE — PLAN OF CARE
Goal Outcome Evaluation:         Patient pleasant through shift. VSS. Up to chair. No complaints of pain or nausea. Blood glucose monitored. Family at bedside. Call light within reach. Bed in lowest locked position. No concerns per patient at this time.   Fabian Sheffield RN                                        No

## 2024-02-13 NOTE — PROGRESS NOTES
[facility]   Hospitalist Progress Note  Date: 2024  Patient Name: Fantasma Mcintosh  : 1965  MRN: [id number]  Date of admission: 2024  Consultants:   -Cardiology: Dr. Grey Andersen, Dr. Vladimir Adams    Subjective   Subjective     Chief Complaint: Edema, palpitations    Summary:   Fantasma Mcintosh is a 59 y.o. male with essential pretension, anxiety, hypothyroidism, hyperlipidemia, chronic paroxysmal atrial fibrillation that presented with lower extremity edema and palpitations.  Eval in ED significant for patient being in A-fib with RVR and labs showing elevated proBNP and elevated creatinine.  Testing did show patient to be positive for influenza A.  Hospitalist service contacted for further evaluation management.  Treatment for influenza A with Tamiflu started.  Cardiology consulted.  Patient started on diltiazem drip.  Patient started on sotalol and home beta-blocker therapy per cardiology recommendation.    Interval Followup:   No acute issues overnight.  Still remains in A-fib with RVR, on diltiazem drip.  Heart rates are little better improved today.  Feels like his breathing is starting to improve.  Still requiring supplemental oxygen.    Objective   Objective     Vitals:   Temp:  [97.3 °F (36.3 °C)-97.9 °F (36.6 °C)] 97.7 °F (36.5 °C)  Heart Rate:  [] 89  Resp:  [16-18] 18  BP: ()/(69-90) 112/90  Flow (L/min):  [2-3] 2  Physical Exam   Gen: No acute distress, sitting up in bedside chair  Resp: CTAB, good aeration, equal chest rise bilaterally, normal respiratory effort  Card: IRIR, no MRG  Abd: Soft, Nontender, Nondistended, + bowel sounds    Result Review    Result Review:  I have personally reviewed the results as below and agree with these findings:  [x]  Laboratory: Personally reviewed BMP, CBC, magnesium, blood sugars  CMP          2024    04:06 2024    04:19 2024    03:10   CMP   Glucose 96  200  163    BUN 17  18  15    Creatinine 0.94  0.91  0.86    EGFR 94.0  97.7   100.4    Sodium 137  136  137    Potassium 3.5  3.5  3.6    Chloride 94  92  94    Calcium 8.6  8.6  8.7    BUN/Creatinine Ratio 18.1  19.8  17.4    Anion Gap 10.2  12.5  10.6      CBC          2/11/2024    04:06 2/12/2024    04:19 2/13/2024    03:10   CBC   WBC 6.04  6.79  6.94    RBC 4.61  4.48  4.73    Hemoglobin 12.4  12.2  12.4    Hematocrit 38.9  37.6  39.5    MCV 84.4  83.9  83.5    MCH 26.9  27.2  26.2    MCHC 31.9  32.4  31.4    RDW 13.6  13.3  13.2    Platelets 97  110  144    Magnesium low.  Phosphorus within normal limits.  []  Microbiology:   []  Radiology:   [x]  EKG/Telemetry:    []  Cardiology/Vascular:    []  Pathology:  []  Old records:  []  Other:    Assessment & Plan   Assessment / Plan     Assessment/plan:  Chronic paroxysmal atrial fibrillation acutely with RVR  Influenza A infection  Acute renal failure (baseline creatinine appears to be 0.7-0.8 and creatinine on admission 1.47)  Acute on chronic heart failure with reduced ejection fraction  Type 2 diabetes mellitus, new diagnosis  Hypothyroidism  Hypokalemia, recurrent  Hypomagnesemia, recurrent  Essential hypertension  Anxiety  Hyperlipidemia  Hyponatremia, not clinically significant  Chronic ongoing tobacco abuse with cigarettes    Continue to monitor in the hospital for management of the above   Cardiology consulted and following, appreciate assistance and recommendations in the care of this patient.  Continue diltiazem drip, wean as tolerated  Continue sotalol, carvedilol and oral diltiazem  Continue Xarelto  Discussed with cardiology-plan for elective cardioversion tomorrow morning  Pleated 5 days of Tamiflu today  Continue diuresis with Lasix 40 mg IV daily.  Monitor renal function and electrolytes closely while on IV diuretics  Replace potassium  Continue current insulin regimen  Trend renal function and electrolytes with a.m. BMP, magnesium   Trend Hgb and WBC with a.m. CBC    Discussed case with: Bedside RN, cardiology    DVT  prophylaxis:  Medical DVT prophylaxis orders are present.      CODE STATUS:   Code Status (Patient has no pulse and is not breathing): CPR (Attempt to Resuscitate)  Medical Interventions (Patient has pulse or is breathing): Full Support

## 2024-02-14 ENCOUNTER — APPOINTMENT (OUTPATIENT)
Dept: CARDIOLOGY | Facility: HOSPITAL | Age: 59
End: 2024-02-14
Payer: MEDICARE

## 2024-02-14 ENCOUNTER — APPOINTMENT (OUTPATIENT)
Dept: GENERAL RADIOLOGY | Facility: HOSPITAL | Age: 59
End: 2024-02-14
Payer: MEDICARE

## 2024-02-14 LAB
ANION GAP SERPL CALCULATED.3IONS-SCNC: 8.8 MMOL/L (ref 5–15)
BASOPHILS # BLD AUTO: 0.03 10*3/MM3 (ref 0–0.2)
BASOPHILS NFR BLD AUTO: 0.4 % (ref 0–1.5)
BUN SERPL-MCNC: 15 MG/DL (ref 6–20)
BUN/CREAT SERPL: 16.5 (ref 7–25)
CALCIUM SPEC-SCNC: 8.5 MG/DL (ref 8.6–10.5)
CHLORIDE SERPL-SCNC: 95 MMOL/L (ref 98–107)
CO2 SERPL-SCNC: 32.2 MMOL/L (ref 22–29)
CREAT SERPL-MCNC: 0.91 MG/DL (ref 0.76–1.27)
DEPRECATED RDW RBC AUTO: 40.3 FL (ref 37–54)
EGFRCR SERPLBLD CKD-EPI 2021: 97.7 ML/MIN/1.73
EOSINOPHIL # BLD AUTO: 0.08 10*3/MM3 (ref 0–0.4)
EOSINOPHIL NFR BLD AUTO: 1.2 % (ref 0.3–6.2)
ERYTHROCYTE [DISTWIDTH] IN BLOOD BY AUTOMATED COUNT: 13.2 % (ref 12.3–15.4)
GLUCOSE BLDC GLUCOMTR-MCNC: 147 MG/DL (ref 70–99)
GLUCOSE BLDC GLUCOMTR-MCNC: 209 MG/DL (ref 70–99)
GLUCOSE BLDC GLUCOMTR-MCNC: 246 MG/DL (ref 70–99)
GLUCOSE BLDC GLUCOMTR-MCNC: 249 MG/DL (ref 70–99)
GLUCOSE SERPL-MCNC: 139 MG/DL (ref 65–99)
HCT VFR BLD AUTO: 37.8 % (ref 37.5–51)
HGB BLD-MCNC: 11.9 G/DL (ref 13–17.7)
IMM GRANULOCYTES # BLD AUTO: 0.02 10*3/MM3 (ref 0–0.05)
IMM GRANULOCYTES NFR BLD AUTO: 0.3 % (ref 0–0.5)
LYMPHOCYTES # BLD AUTO: 0.98 10*3/MM3 (ref 0.7–3.1)
LYMPHOCYTES NFR BLD AUTO: 14.5 % (ref 19.6–45.3)
MAGNESIUM SERPL-MCNC: 1.7 MG/DL (ref 1.6–2.6)
MCH RBC QN AUTO: 26.3 PG (ref 26.6–33)
MCHC RBC AUTO-ENTMCNC: 31.5 G/DL (ref 31.5–35.7)
MCV RBC AUTO: 83.4 FL (ref 79–97)
MONOCYTES # BLD AUTO: 0.64 10*3/MM3 (ref 0.1–0.9)
MONOCYTES NFR BLD AUTO: 9.5 % (ref 5–12)
NEUTROPHILS NFR BLD AUTO: 5.01 10*3/MM3 (ref 1.7–7)
NEUTROPHILS NFR BLD AUTO: 74.1 % (ref 42.7–76)
NRBC BLD AUTO-RTO: 0 /100 WBC (ref 0–0.2)
PHOSPHATE SERPL-MCNC: 2.6 MG/DL (ref 2.5–4.5)
PLATELET # BLD AUTO: 153 10*3/MM3 (ref 140–450)
PMV BLD AUTO: 11.5 FL (ref 6–12)
POTASSIUM SERPL-SCNC: 3.7 MMOL/L (ref 3.5–5.2)
QT INTERVAL: 378 MS
QTC INTERVAL: 439 MS
RBC # BLD AUTO: 4.53 10*6/MM3 (ref 4.14–5.8)
SODIUM SERPL-SCNC: 136 MMOL/L (ref 136–145)
WBC NRBC COR # BLD AUTO: 6.76 10*3/MM3 (ref 3.4–10.8)

## 2024-02-14 PROCEDURE — 80048 BASIC METABOLIC PNL TOTAL CA: CPT | Performed by: INTERNAL MEDICINE

## 2024-02-14 PROCEDURE — 25010000002 FUROSEMIDE PER 20 MG: Performed by: INTERNAL MEDICINE

## 2024-02-14 PROCEDURE — 92960 CARDIOVERSION ELECTRIC EXT: CPT

## 2024-02-14 PROCEDURE — 92960 CARDIOVERSION ELECTRIC EXT: CPT | Performed by: INTERNAL MEDICINE

## 2024-02-14 PROCEDURE — 85025 COMPLETE CBC W/AUTO DIFF WBC: CPT | Performed by: INTERNAL MEDICINE

## 2024-02-14 PROCEDURE — 63710000001 INSULIN LISPRO (HUMAN) PER 5 UNITS: Performed by: STUDENT IN AN ORGANIZED HEALTH CARE EDUCATION/TRAINING PROGRAM

## 2024-02-14 PROCEDURE — 82948 REAGENT STRIP/BLOOD GLUCOSE: CPT

## 2024-02-14 PROCEDURE — 5A2204Z RESTORATION OF CARDIAC RHYTHM, SINGLE: ICD-10-PCS | Performed by: INTERNAL MEDICINE

## 2024-02-14 PROCEDURE — 82948 REAGENT STRIP/BLOOD GLUCOSE: CPT | Performed by: STUDENT IN AN ORGANIZED HEALTH CARE EDUCATION/TRAINING PROGRAM

## 2024-02-14 PROCEDURE — 84100 ASSAY OF PHOSPHORUS: CPT | Performed by: INTERNAL MEDICINE

## 2024-02-14 PROCEDURE — 99232 SBSQ HOSP IP/OBS MODERATE 35: CPT | Performed by: INTERNAL MEDICINE

## 2024-02-14 PROCEDURE — 71045 X-RAY EXAM CHEST 1 VIEW: CPT

## 2024-02-14 PROCEDURE — 93005 ELECTROCARDIOGRAM TRACING: CPT | Performed by: INTERNAL MEDICINE

## 2024-02-14 PROCEDURE — 83735 ASSAY OF MAGNESIUM: CPT | Performed by: INTERNAL MEDICINE

## 2024-02-14 PROCEDURE — 25010000002 MIDAZOLAM PER 1MG: Performed by: INTERNAL MEDICINE

## 2024-02-14 PROCEDURE — 25010000002 MEPERIDINE PER 100 MG: Performed by: INTERNAL MEDICINE

## 2024-02-14 RX ORDER — MIDAZOLAM HYDROCHLORIDE 2 MG/2ML
INJECTION, SOLUTION INTRAMUSCULAR; INTRAVENOUS
Status: COMPLETED | OUTPATIENT
Start: 2024-02-14 | End: 2024-02-14

## 2024-02-14 RX ORDER — MEPERIDINE HYDROCHLORIDE 25 MG/ML
INJECTION INTRAMUSCULAR; INTRAVENOUS; SUBCUTANEOUS
Status: COMPLETED | OUTPATIENT
Start: 2024-02-14 | End: 2024-02-14

## 2024-02-14 RX ADMIN — DILTIAZEM HYDROCHLORIDE 180 MG: 180 CAPSULE, COATED, EXTENDED RELEASE ORAL at 09:49

## 2024-02-14 RX ADMIN — RIVAROXABAN 20 MG: 20 TABLET, FILM COATED ORAL at 09:49

## 2024-02-14 RX ADMIN — SOTALOL HYDROCHLORIDE 80 MG: 80 TABLET ORAL at 09:49

## 2024-02-14 RX ADMIN — MIDAZOLAM HYDROCHLORIDE 4 MG: 1 INJECTION, SOLUTION INTRAMUSCULAR; INTRAVENOUS at 08:20

## 2024-02-14 RX ADMIN — INSULIN LISPRO 3 UNITS: 100 INJECTION, SOLUTION INTRAVENOUS; SUBCUTANEOUS at 21:37

## 2024-02-14 RX ADMIN — ESCITALOPRAM OXALATE 20 MG: 10 TABLET ORAL at 09:49

## 2024-02-14 RX ADMIN — Medication 10 ML: at 21:32

## 2024-02-14 RX ADMIN — INSULIN LISPRO 3 UNITS: 100 INJECTION, SOLUTION INTRAVENOUS; SUBCUTANEOUS at 12:16

## 2024-02-14 RX ADMIN — INSULIN LISPRO 3 UNITS: 100 INJECTION, SOLUTION INTRAVENOUS; SUBCUTANEOUS at 16:56

## 2024-02-14 RX ADMIN — CETIRIZINE HYDROCHLORIDE 10 MG: 10 TABLET, FILM COATED ORAL at 09:49

## 2024-02-14 RX ADMIN — MEPERIDINE HYDROCHLORIDE 25 MG: 25 INJECTION INTRAMUSCULAR; INTRAVENOUS; SUBCUTANEOUS at 08:23

## 2024-02-14 RX ADMIN — ASPIRIN 81 MG: 81 TABLET, COATED ORAL at 09:49

## 2024-02-14 RX ADMIN — CARVEDILOL 25 MG: 25 TABLET, FILM COATED ORAL at 09:50

## 2024-02-14 RX ADMIN — LEVOTHYROXINE SODIUM 25 MCG: 25 TABLET ORAL at 05:20

## 2024-02-14 RX ADMIN — SOTALOL HYDROCHLORIDE 80 MG: 80 TABLET ORAL at 21:32

## 2024-02-14 RX ADMIN — FUROSEMIDE 40 MG: 10 INJECTION, SOLUTION INTRAMUSCULAR; INTRAVENOUS at 09:50

## 2024-02-14 RX ADMIN — Medication 10 ML: at 09:50

## 2024-02-14 RX ADMIN — CARVEDILOL 25 MG: 25 TABLET, FILM COATED ORAL at 17:00

## 2024-02-14 RX ADMIN — ROSUVASTATIN CALCIUM 20 MG: 20 TABLET, FILM COATED ORAL at 21:32

## 2024-02-14 NOTE — PROGRESS NOTES
CARDIOLOGY  INPATIENT PROGRESS NOTE         [facility] 5TH FLOOR SURGICAL TELEMETRY UNIT    2/14/2024      PATIENT IDENTIFICATION:   Name:  Fantasma Mcintosh      MRN:  [id number]     59 y.o.  male             Reason for visit: Influenza A, shortness of breath, persistent atrial fibrillation      SUBJECTIVE:    The patient is clinically stable, s/p cardioversion this morning  OBJECTIVE:  Vitals:    02/14/24 0300 02/14/24 0400 02/14/24 0456 02/14/24 0600   BP: 111/82 109/83  107/91   BP Location:       Patient Position: Lying      Pulse: 112 116  (!) 123   Resp: 16      Temp:       TempSrc:       SpO2: 93% 94%  95%   Weight:   104 kg (228 lb 2.8 oz)    Height:               Body mass index is 27.79 kg/m².    Intake/Output Summary (Last 24 hours) at 2/14/2024 0826  Last data filed at 2/13/2024 2301  Gross per 24 hour   Intake 750 ml   Output 1050 ml   Net -300 ml       Telemetry: Atrial fibrillation with mildly rapid rates    Review of Systems   Respiratory:  Positive for shortness of breath. Negative for chest tightness.    Cardiovascular:  Negative for chest pain and palpitations.         Exam:  General appearance no acute distress    well nourished   HEENT sclerae non-icteric    lips not cyanotic   Respiratory rate and depth normal    normal breath sounds    no rales, no wheeze   Cardiovascular JVP normal    Irregularly irregular rhythm    S1 normal, S2 normal    no S3, no S4    no murmur    lower extremity edema:none   Abdominal bowel sounds normal    abdomen soft, non-tender    no hepatosplenomegaly   Neuro-psych oriented to person, place, time    cooperative     Allergies   Allergen Reactions    Penicillins Itching     Scheduled meds:  aspirin, 81 mg, Oral, Daily  carvedilol, 25 mg, Oral, BID With Meals  cetirizine, 10 mg, Oral, Daily  dilTIAZem CD, 180 mg, Oral, Q24H  escitalopram, 20 mg, Oral, Daily  furosemide, 40 mg, Intravenous, Daily  insulin lispro, 2-7 Units, Subcutaneous, 4x Daily AC & at  "Bedtime  levothyroxine, 25 mcg, Oral, Q AM  nicotine, 1 patch, Transdermal, Q24H  Pharmacy Consult, , Does not apply, Once  rivaroxaban, 20 mg, Oral, Daily  rosuvastatin, 20 mg, Oral, Nightly  sodium chloride, 1,000 mL, Intravenous, Once  sodium chloride, 10 mL, Intravenous, Q12H  sotalol, 80 mg, Oral, Q12H      IV meds:                      dilTIAZem, 5-15 mg/hr, Last Rate: Stopped (02/13/24 1930)      Data Review:  Results from last 7 days   Lab Units 02/14/24  0405 02/13/24  0310 02/12/24  0419 02/11/24  0406   SODIUM mmol/L 136 137 136 137   BUN mg/dL 15 15 18 17   CREATININE mg/dL 0.91 0.86 0.91 0.94   GLUCOSE mg/dL 139* 163* 200* 96             Estimated Creatinine Clearance: 130.2 mL/min (by C-G formula based on SCr of 0.91 mg/dL).  Results from last 7 days   Lab Units 02/14/24  0405 02/13/24 0310 02/12/24 0419 02/11/24  0406 02/10/24  0419   WBC 10*3/mm3 6.76 6.94 6.79 6.04 6.08   HEMOGLOBIN g/dL 11.9* 12.4* 12.2* 12.4* 12.3*     Results from last 7 days   Lab Units 02/09/24  0215   INR  2.24*     Results from last 7 days   Lab Units 02/09/24  0042   ALT (SGPT) U/L 7   AST (SGOT) U/L 18     No results found for: \"DIGOXIN\"   Lab Results   Component Value Date    TSH 0.936 06/06/2023           Invalid input(s): \"LDLCALC\"            Imaging (last 24 hr):   Imaging Results (Last 24 Hours)       ** No results found for the last 24 hours. **              ASSESSMENT:     Atrial fibrillation with RVR    Influenza A        PLAN:  1.  S/p cardioversion this am, successful, in NSR rates in 70s.  2.  Continue current med Rx.  If he has recurrence of afib, elective ablation can be considered later              Santos Andersen MD  2/14/2024    08:26 EST           "

## 2024-02-14 NOTE — PLAN OF CARE
Goal Outcome Evaluation:  Plan of Care Reviewed With: patient        Progress: no change  Outcome Evaluation: Pt was on cardizem drip at beginning of shift. SBP decreased to <100. Pt remained in afib. Cardizem drip stopped per order parameter. Hospitalist notified. Okay to stop drip and to bolus 250 mL of NS. BP stable afterwards. Pt remained in afib. HR did not sustain >120s. No complaint of chest pain. NPO at midnight for plan to cardiovert in the morning.

## 2024-02-14 NOTE — PLAN OF CARE
Goal Outcome Evaluation:      Patient pleasant through shift. VSS. Heart rate monitored. No complaints of pain or nausea. Up to bathroom. Family at bedside. Call light and table within reach. No concerns per patient at this time.

## 2024-02-14 NOTE — PROGRESS NOTES
[facility]   Hospitalist Progress Note  Date: 2024  Patient Name: Fantasma Mcintosh  : 1965  MRN: [id number]  Date of admission: 2024  Consultants:   -Cardiology: Dr. Grey Andersen, Dr. Vladimir Adams    Subjective   Subjective     Chief Complaint: Edema, palpitations    Summary:   Fantasma Mcintosh is a 59 y.o. male with essential pretension, anxiety, hypothyroidism, hyperlipidemia, chronic paroxysmal atrial fibrillation that presented with lower extremity edema and palpitations.  Eval in ED significant for patient being in A-fib with RVR and labs showing elevated proBNP and elevated creatinine.  Testing did show patient to be positive for influenza A.  Hospitalist service contacted for further evaluation management.  Treatment for influenza A with Tamiflu started.  Cardiology consulted.  Patient started on diltiazem drip.  Patient started on sotalol and home beta-blocker therapy per cardiology recommendation.    Interval Followup:   No acute events overnight.  Was seen after cardioversion this morning which was successful.  He remains in normal sinus rhythm.  Still requiring supplemental oxygen    Objective   Objective     Vitals:   Temp:  [96.4 °F (35.8 °C)-98.1 °F (36.7 °C)] 97.3 °F (36.3 °C)  Heart Rate:  [] 80  Resp:  [12-24] 18  BP: ()/(55-94) 121/80  Flow (L/min):  [2-3] 3  Physical Exam   Gen: No acute distress, sitting up in bedside chair  Resp: CTAB, good aeration, equal chest rise bilaterally, normal respiratory effort  Card: IRIR, no MRG  Abd: Soft, Nontender, Nondistended, + bowel sounds    Result Review    Result Review:  I have personally reviewed the results as below and agree with these findings:  [x]  Laboratory: Personally reviewed BMP, CBC, blood sugars, magnesium  CMP          2024    04:19 2024    03:10 2024    04:05   CMP   Glucose 200  163  139    BUN 18  15  15    Creatinine 0.91  0.86  0.91    EGFR 97.7  100.4  97.7    Sodium 136  137  136    Potassium 3.5   3.6  3.7    Chloride 92  94  95    Calcium 8.6  8.7  8.5    BUN/Creatinine Ratio 19.8  17.4  16.5    Anion Gap 12.5  10.6  8.8      CBC          2/12/2024    04:19 2/13/2024    03:10 2/14/2024    04:05   CBC   WBC 6.79  6.94  6.76    RBC 4.48  4.73  4.53    Hemoglobin 12.2  12.4  11.9    Hematocrit 37.6  39.5  37.8    MCV 83.9  83.5  83.4    MCH 27.2  26.2  26.3    MCHC 32.4  31.4  31.5    RDW 13.3  13.2  13.2    Platelets 110  144  153      []  Microbiology:   []  Radiology:   [x]  EKG/Telemetry:    []  Cardiology/Vascular:    []  Pathology:  []  Old records:  []  Other:    Assessment & Plan   Assessment / Plan     Assessment/plan:  Chronic paroxysmal atrial fibrillation acutely with RVR  Influenza A infection  Acute renal failure (baseline creatinine appears to be 0.7-0.8 and creatinine on admission 1.47)  Acute on chronic heart failure with reduced ejection fraction  Poorly controlled type 2 diabetes mellitus  Hypothyroidism  Hypokalemia, recurrent  Hypomagnesemia, recurrent  Essential hypertension  Anxiety  Hyperlipidemia  Hyponatremia, not clinically significant  Chronic ongoing tobacco abuse with cigarettes    Continue to monitor in the hospital for management of the above   Discussed with cardiology-patient underwent successful cardioversion this morning, remains in normal sinus rhythm  Now off diltiazem drip  Continue sotalol, carvedilol and oral diltiazem  Continue Xarelto  Repeat chest x-ray today due to his persistent hypoxia  Continue diuresis with Lasix 40 mg IV daily.  Monitor renal function and electrolytes closely while on IV diuretics  Patient has a history of nonadherence to his diabetic regimen, he had insulin pump but this caused episodes of hypoglycemia.  He was prescribed Mounjaro and Xigduo but he has not picked these up in the last 2 months which correlates to his A1c of 15  Will prescribe these at discharge  Continue current insulin regimen, blood sugars acceptable  Trend renal function and  electrolytes with a.m. BMP, magnesium   Trend Hgb and WBC with a.m. CBC    Discussed case with: Bedside RN, cardiology    DVT prophylaxis:  Medical DVT prophylaxis orders are present.      CODE STATUS:   Code Status (Patient has no pulse and is not breathing): CPR (Attempt to Resuscitate)  Medical Interventions (Patient has pulse or is breathing): Full Support

## 2024-02-15 ENCOUNTER — READMISSION MANAGEMENT (OUTPATIENT)
Dept: CALL CENTER | Facility: HOSPITAL | Age: 59
End: 2024-02-15
Payer: MEDICARE

## 2024-02-15 VITALS
SYSTOLIC BLOOD PRESSURE: 118 MMHG | DIASTOLIC BLOOD PRESSURE: 86 MMHG | WEIGHT: 222 LBS | HEIGHT: 76 IN | HEART RATE: 82 BPM | OXYGEN SATURATION: 94 % | BODY MASS INDEX: 27.03 KG/M2 | RESPIRATION RATE: 16 BRPM | TEMPERATURE: 99 F

## 2024-02-15 LAB
ANION GAP SERPL CALCULATED.3IONS-SCNC: 9.9 MMOL/L (ref 5–15)
BASOPHILS # BLD AUTO: 0.03 10*3/MM3 (ref 0–0.2)
BASOPHILS NFR BLD AUTO: 0.5 % (ref 0–1.5)
BUN SERPL-MCNC: 16 MG/DL (ref 6–20)
BUN/CREAT SERPL: 14.5 (ref 7–25)
CALCIUM SPEC-SCNC: 8.8 MG/DL (ref 8.6–10.5)
CHLORIDE SERPL-SCNC: 95 MMOL/L (ref 98–107)
CO2 SERPL-SCNC: 31.1 MMOL/L (ref 22–29)
CREAT SERPL-MCNC: 1.1 MG/DL (ref 0.76–1.27)
DEPRECATED RDW RBC AUTO: 40.5 FL (ref 37–54)
EGFRCR SERPLBLD CKD-EPI 2021: 77.8 ML/MIN/1.73
EOSINOPHIL # BLD AUTO: 0.11 10*3/MM3 (ref 0–0.4)
EOSINOPHIL NFR BLD AUTO: 2 % (ref 0.3–6.2)
ERYTHROCYTE [DISTWIDTH] IN BLOOD BY AUTOMATED COUNT: 13.2 % (ref 12.3–15.4)
GLUCOSE BLDC GLUCOMTR-MCNC: 203 MG/DL (ref 70–99)
GLUCOSE BLDC GLUCOMTR-MCNC: 215 MG/DL (ref 70–99)
GLUCOSE SERPL-MCNC: 195 MG/DL (ref 65–99)
HCT VFR BLD AUTO: 38.1 % (ref 37.5–51)
HGB BLD-MCNC: 11.8 G/DL (ref 13–17.7)
IMM GRANULOCYTES # BLD AUTO: 0.01 10*3/MM3 (ref 0–0.05)
IMM GRANULOCYTES NFR BLD AUTO: 0.2 % (ref 0–0.5)
LYMPHOCYTES # BLD AUTO: 0.86 10*3/MM3 (ref 0.7–3.1)
LYMPHOCYTES NFR BLD AUTO: 15.5 % (ref 19.6–45.3)
MAGNESIUM SERPL-MCNC: 1.7 MG/DL (ref 1.6–2.6)
MCH RBC QN AUTO: 25.9 PG (ref 26.6–33)
MCHC RBC AUTO-ENTMCNC: 31 G/DL (ref 31.5–35.7)
MCV RBC AUTO: 83.7 FL (ref 79–97)
MONOCYTES # BLD AUTO: 0.61 10*3/MM3 (ref 0.1–0.9)
MONOCYTES NFR BLD AUTO: 11 % (ref 5–12)
NEUTROPHILS NFR BLD AUTO: 3.93 10*3/MM3 (ref 1.7–7)
NEUTROPHILS NFR BLD AUTO: 70.8 % (ref 42.7–76)
NRBC BLD AUTO-RTO: 0 /100 WBC (ref 0–0.2)
PHOSPHATE SERPL-MCNC: 2.9 MG/DL (ref 2.5–4.5)
PLATELET # BLD AUTO: 179 10*3/MM3 (ref 140–450)
PMV BLD AUTO: 11.3 FL (ref 6–12)
POTASSIUM SERPL-SCNC: 3.8 MMOL/L (ref 3.5–5.2)
RBC # BLD AUTO: 4.55 10*6/MM3 (ref 4.14–5.8)
SODIUM SERPL-SCNC: 136 MMOL/L (ref 136–145)
WBC NRBC COR # BLD AUTO: 5.55 10*3/MM3 (ref 3.4–10.8)

## 2024-02-15 PROCEDURE — 82948 REAGENT STRIP/BLOOD GLUCOSE: CPT

## 2024-02-15 PROCEDURE — 85025 COMPLETE CBC W/AUTO DIFF WBC: CPT | Performed by: INTERNAL MEDICINE

## 2024-02-15 PROCEDURE — 25010000002 FUROSEMIDE PER 20 MG: Performed by: INTERNAL MEDICINE

## 2024-02-15 PROCEDURE — 63710000001 INSULIN LISPRO (HUMAN) PER 5 UNITS: Performed by: INTERNAL MEDICINE

## 2024-02-15 PROCEDURE — 80048 BASIC METABOLIC PNL TOTAL CA: CPT | Performed by: INTERNAL MEDICINE

## 2024-02-15 PROCEDURE — 83735 ASSAY OF MAGNESIUM: CPT | Performed by: INTERNAL MEDICINE

## 2024-02-15 PROCEDURE — 84100 ASSAY OF PHOSPHORUS: CPT | Performed by: INTERNAL MEDICINE

## 2024-02-15 PROCEDURE — 99239 HOSP IP/OBS DSCHRG MGMT >30: CPT | Performed by: INTERNAL MEDICINE

## 2024-02-15 PROCEDURE — 94618 PULMONARY STRESS TESTING: CPT

## 2024-02-15 PROCEDURE — 82948 REAGENT STRIP/BLOOD GLUCOSE: CPT | Performed by: INTERNAL MEDICINE

## 2024-02-15 RX ORDER — DILTIAZEM HYDROCHLORIDE 180 MG/1
180 CAPSULE, COATED, EXTENDED RELEASE ORAL
Qty: 30 CAPSULE | Refills: 0 | Status: SHIPPED | OUTPATIENT
Start: 2024-02-16

## 2024-02-15 RX ORDER — SOTALOL HYDROCHLORIDE 80 MG/1
80 TABLET ORAL EVERY 12 HOURS SCHEDULED
Qty: 60 TABLET | Refills: 0 | Status: SHIPPED | OUTPATIENT
Start: 2024-02-15 | End: 2024-03-16

## 2024-02-15 RX ORDER — CARVEDILOL 25 MG/1
25 TABLET ORAL 2 TIMES DAILY WITH MEALS
Qty: 60 TABLET | Refills: 0 | Status: SHIPPED | OUTPATIENT
Start: 2024-02-15 | End: 2024-03-16

## 2024-02-15 RX ADMIN — RIVAROXABAN 20 MG: 20 TABLET, FILM COATED ORAL at 09:04

## 2024-02-15 RX ADMIN — LEVOTHYROXINE SODIUM 25 MCG: 25 TABLET ORAL at 06:18

## 2024-02-15 RX ADMIN — ASPIRIN 81 MG: 81 TABLET, COATED ORAL at 09:03

## 2024-02-15 RX ADMIN — NICOTINE 1 PATCH: 21 PATCH, EXTENDED RELEASE TRANSDERMAL at 09:03

## 2024-02-15 RX ADMIN — CARVEDILOL 25 MG: 25 TABLET, FILM COATED ORAL at 09:04

## 2024-02-15 RX ADMIN — CETIRIZINE HYDROCHLORIDE 10 MG: 10 TABLET, FILM COATED ORAL at 09:04

## 2024-02-15 RX ADMIN — DILTIAZEM HYDROCHLORIDE 180 MG: 180 CAPSULE, COATED, EXTENDED RELEASE ORAL at 09:04

## 2024-02-15 RX ADMIN — ESCITALOPRAM OXALATE 20 MG: 10 TABLET ORAL at 09:03

## 2024-02-15 RX ADMIN — INSULIN LISPRO 3 UNITS: 100 INJECTION, SOLUTION INTRAVENOUS; SUBCUTANEOUS at 09:04

## 2024-02-15 RX ADMIN — SOTALOL HYDROCHLORIDE 80 MG: 80 TABLET ORAL at 09:03

## 2024-02-15 RX ADMIN — FUROSEMIDE 40 MG: 10 INJECTION, SOLUTION INTRAMUSCULAR; INTRAVENOUS at 09:04

## 2024-02-15 RX ADMIN — Medication 10 ML: at 09:04

## 2024-02-15 NOTE — PLAN OF CARE
Goal Outcome Evaluation:  Plan of Care Reviewed With: patient        Progress: improving          Discharge education complete, understanding voiced. Denies pain or discomfort at this time. Parents to take home via POV.  Shima Morgan RN

## 2024-02-15 NOTE — DISCHARGE SUMMARY
[facility]         HOSPITALIST  DISCHARGE SUMMARY    Patient Name: Fantasma Mcintosh  : 1965  MRN: [id number]    Date of Admission: 2024  Date of Discharge: 2/15/2024  Primary Care Physician: Anish Alanis MD    Consults       Date and Time Order Name Status Description    2024  6:15 AM Inpatient Cardiology Consult      2024  2:03 AM Hospitalist (on-call MD unless specified)              Active and Resolved Hospital Problems:  Active Hospital Problems    Diagnosis POA    **Atrial fibrillation with RVR [I48.91] Yes    Influenza A [J10.1] Unknown      Resolved Hospital Problems   No resolved problems to display.   Chronic paroxysmal atrial fibrillation acutely with RVR  Influenza A infection  Acute renal failure (baseline creatinine appears to be 0.7-0.8 and creatinine on admission 1.47)  Acute on chronic heart failure with reduced ejection fraction  Poorly controlled type 2 diabetes mellitus  Hypothyroidism  Hypokalemia, recurrent  Hypomagnesemia, recurrent  Essential hypertension  Anxiety  Hyperlipidemia  Hyponatremia, not clinically significant  Likely obstructive sleep apnea  Chronic ongoing tobacco abuse with cigarettes    Hospital Course     Hospital Course:  Fantasma Mcintosh is a 59 y.o. male with essential pretension, anxiety, hypothyroidism, hyperlipidemia, chronic paroxysmal atrial fibrillation that presented with lower extremity edema and palpitations. Eval in ED significant for patient being in A-fib with RVR and labs showing elevated proBNP and elevated creatinine.  He tested positive for influenza A.  He was admitted for further care, cardiology was consulted.  Treatment for influenza A with Tamiflu started.  Required supplemental oxygen but this was weaned off prior to discharge.  Patient started on diltiazem drip. Patient started on sotalol and home beta-blocker therapy per cardiology recommendation.  He ultimately underwent successful cardioversion on 2024.   He remained in normal sinus rhythm and was cleared for discharge from a cardiology perspective.  He does desaturate at night and likely has undiagnosed sleep apnea.  He will require an outpatient sleep study and would benefit from CPAP.  He was discharged home in stable condition on 2/15/2024.  Recommend follow-up with PCP within 1 week, cardiology within 2 weeks.    DISCHARGE Follow Up Recommendations for labs and diagnostics: Outpatient sleep study    Day of Discharge     Vital Signs:  Temp:  [95.4 °F (35.2 °C)-99 °F (37.2 °C)] 99 °F (37.2 °C)  Heart Rate:  [71-82] 82  Resp:  [16-18] 16  BP: ()/(72-90) 126/90  Flow (L/min):  [1-3] 1  Physical Exam:   Gen: NAD, WDWN  ENT: PERRL, EOMI   CV: RRR no MRG  Pulm: CTAB, no w/r/r  GI: Abd soft, NTND, +bs  Neuro: Moving all extremities spontaneously, CN II-XII grossly intact   Psych: A&O*3, normal mood and affect  Skin: No lesions or rashes noted    Discharge Details        Discharge Medications        New Medications        Instructions Start Date   dilTIAZem  MG 24 hr capsule  Commonly known as: CARDIZEM CD   180 mg, Oral, Every 24 Hours Scheduled   Start Date: February 16, 2024     sotalol 80 MG tablet  Commonly known as: BETAPACE   80 mg, Oral, Every 12 Hours Scheduled             Changes to Medications        Instructions Start Date   carvedilol 25 MG tablet  Commonly known as: COREG  What changed: how much to take   25 mg, Oral, 2 Times Daily With Meals             Continue These Medications        Instructions Start Date   albuterol sulfate  (90 Base) MCG/ACT inhaler  Commonly known as: PROVENTIL HFA;VENTOLIN HFA;PROAIR HFA   2 puffs, Inhalation, Every 4 Hours PRN      aspirin 81 MG EC tablet   81 mg, Oral, Daily      cetirizine 10 MG tablet  Commonly known as: zyrTEC   Take 1 tablet by mouth Daily.      cholecalciferol 25 MCG (1000 UT) tablet  Commonly known as: VITAMIN D3   Take 2 tablets by mouth Daily.      escitalopram 20 MG tablet  Commonly  known as: LEXAPRO   20 mg, Oral, Daily      fish oil 1200 MG capsule capsule   1,200 mg, Oral, 2 Times Daily      furosemide 40 MG tablet  Commonly known as: LASIX   1 tablet, Oral, Daily      levothyroxine 50 MCG tablet  Commonly known as: SYNTHROID, LEVOTHROID   50 mcg, Oral, 2 Times Daily      loratadine 10 MG tablet  Commonly known as: CLARITIN   1 tablet, Oral, Daily      rosuvastatin 20 MG tablet  Commonly known as: CRESTOR   1 tablet, Oral, Every Night at Bedtime      spironolactone 25 MG tablet  Commonly known as: ALDACTONE   1 tablet, Oral, Daily      Xarelto 20 MG tablet  Generic drug: rivaroxaban   1 tablet, Oral, Daily             Stop These Medications      cefdinir 300 MG capsule  Commonly known as: OMNICEF              Allergies   Allergen Reactions    Penicillins Itching       Discharge Disposition:  Home or Self Care    Diet:  Hospital:  Diet Order   Procedures    Diet: Cardiac Diets; Healthy Heart (2-3 Na+); Texture: Regular Texture (IDDSI 7); Fluid Consistency: Thin (IDDSI 0)       Discharge Activity:   Activity Instructions       Activity as Tolerated              CODE STATUS:  Code Status and Medical Interventions:   Ordered at: 02/09/24 1524     Code Status (Patient has no pulse and is not breathing):    CPR (Attempt to Resuscitate)     Medical Interventions (Patient has pulse or is breathing):    Full Support         Future Appointments   Date Time Provider Department Center   3/7/2024 11:45 AM Nayeli Rivera APRN [facility] [place] [facility]       Additional Instructions for the Follow-ups that You Need to Schedule       Discharge Follow-up with PCP   As directed       Currently Documented PCP:    Anish Alanis MD    PCP Phone Number:    [phone]     Follow Up Details: 3-5 days        Discharge Follow-up with Specified Provider: Cardiology; 2 Weeks   As directed      To: Cardiology   Follow Up: 2 Weeks                Pertinent  and/or Most Recent Results     PROCEDURES:    Cardioversion    LAB RESULTS:      Lab 02/15/24  0307 02/14/24  0405 02/13/24  0310 02/12/24  0419 02/11/24  0406 02/10/24  0419 02/09/24  0641 02/09/24  0215 02/09/24  0042   WBC 5.55 6.76 6.94 6.79 6.04   < > 7.21  --  7.97   HEMOGLOBIN 11.8* 11.9* 12.4* 12.2* 12.4*   < > 12.2*  --  13.1   HEMATOCRIT 38.1 37.8 39.5 37.6 38.9   < > 36.9*  --  40.0   PLATELETS 179 153 144 110* 97*   < > 75*  --  85*   NEUTROS ABS 3.93 5.01  --   --   --   --  5.68  --  6.44   IMMATURE GRANS (ABS) 0.01 0.02  --   --   --   --  0.03  --  0.02   LYMPHS ABS 0.86 0.98  --   --   --   --  0.94  --  0.79   MONOS ABS 0.61 0.64  --   --   --   --  0.55  --  0.67   EOS ABS 0.11 0.08  --   --   --   --  0.00  --  0.02   MCV 83.7 83.4 83.5 83.9 84.4   < > 83.1  --  82.5   PROTIME  --   --   --   --   --   --   --  25.2*  --     < > = values in this interval not displayed.         Lab 02/15/24  0307 02/14/24  0405 02/13/24  0310 02/12/24  0419 02/11/24  0406 02/10/24  0419 02/09/24  0641   SODIUM 136 136 137 136 137 133* 131*   POTASSIUM 3.8 3.7 3.6 3.5 3.5 3.4* 3.3*   CHLORIDE 95* 95* 94* 92* 94* 92* 88*   CO2 31.1* 32.2* 32.4* 31.5* 32.8* 32.6* 28.6   ANION GAP 9.9 8.8 10.6 12.5 10.2 8.4 14.4   BUN 16 15 15 18 17 20 19   CREATININE 1.10 0.91 0.86 0.91 0.94 1.13 1.26   EGFR 77.8 97.7 100.4 97.7 94.0 75.3 66.1   GLUCOSE 195* 139* 163* 200* 96 63* 369*   CALCIUM 8.8 8.5* 8.7 8.6 8.6 8.6 8.3*   MAGNESIUM 1.7 1.7 1.9 1.7 1.9 1.8 1.3*   PHOSPHORUS 2.9 2.6  --  2.8 3.0 2.6  --    HEMOGLOBIN A1C  --   --   --   --   --   --  15.90*         Lab 02/09/24  0042   TOTAL PROTEIN 6.8   ALBUMIN 3.5   GLOBULIN 3.3   ALT (SGPT) 7   AST (SGOT) 18   BILIRUBIN 1.2   ALK PHOS 63         Lab 02/09/24  0519 02/09/24  0247 02/09/24  0215 02/09/24  0042   PROBNP  --   --   --  4,238.0*   HSTROP T 23* 24*  --  27*   PROTIME  --   --  25.2*  --    INR  --   --  2.24*  --                  Brief Urine Lab Results       None          Microbiology Results (last 10 days)        Procedure Component Value - Date/Time    COVID-19, FLU A/B, RSV PCR 1 HR TAT - Swab, Nasopharynx [[id number]]  (Abnormal) Collected: 02/08/24 2332    Lab Status: Final result Specimen: Swab from Nasopharynx Updated: 02/09/24 0047     COVID19 Not Detected     Influenza A PCR Detected     Influenza B PCR Not Detected     RSV, PCR Not Detected    Narrative:      Fact sheet for providers: https://www.fda.gov/media/105838/download    Fact sheet for patients: https://www.fda.gov/media/874088/download    Test performed by PCR.            XR Chest 1 View    Result Date: 2/15/2024   No active disease is seen.       TOMMY MORENO MD       Electronically Signed and Approved By: TOMMY MORENO MD on 2/15/2024 at 9:38             US Renal Bilateral    Result Date: 2/9/2024   Negative bilateral renal ultrasound.      TOMMY MORENO MD       Electronically Signed and Approved By: TOMMY MORENO MD on 2/09/2024 at 22:26             XR Chest 1 View    Result Date: 2/9/2024    No acute infiltrate is appreciated.     Please note that portions of this note were completed with a voice recognition program.  ROXY AMBROSIO JR, MD       Electronically Signed and Approved By: ROXY AMBROSIO JR, MD on 2/09/2024 at 0:59                        Results for orders placed during the hospital encounter of 02/08/24    Adult Transthoracic Echo Complete W/ Cont if Necessary Per Protocol    Interpretation Summary    Left ventricular systolic function is moderately decreased. Calculated left ventricular EF = 34.7% Ejection fraction may be underestimated with buzz being in Afib with RVR.    The left ventricular cavity is dilated.    Left ventricular diastolic function was indeterminate.    Mildly reduced right ventricular systolic function noted.    The right ventricular cavity is dilated.    The left atrial cavity is dilated.    The right atrial cavity is dilated.    Estimated right ventricular systolic pressure from tricuspid regurgitation is  mildly elevated (35-45 mmHg).    There is a small (<1cm) pericardial effusion.      Labs Pending at Discharge:        Time spent on Discharge including face to face service: 34 minutes    Electronically signed by Erasmo Mcmahan MD, 02/15/24, 12:21 PM EST.

## 2024-02-15 NOTE — PROGRESS NOTES
Walking Oximetry Progress Note      Patient Name:  Fantasma Mcintosh  YOB: 1965  Date of Procedure: 02/15/24              ROOM AIR BASELINE   SpO2% 92   Heart Rate 84     EXERCISE ON ROOM AIR SpO2% EXERCISE ON O2 LPM SpO2%   1 MINUTE 92 1 MINUTE     2 MINUTES 91 2 MINUTES     3 MINUTES 90 3 MINUTES     4 MINUTES 91 4 MINUTES     5 MINUTES 90 5 MINUTES     6 MINUTES 90 6 MINUTES                Time to Recovery  NA   SpO2% Post Exercise  93 on room air.    HR Post Exercise  82     Comments: patient stated no increase in shortness of breath during walk          Electronically signed by Roge Henson CRT, 02/15/24, 10:08 AM EST.

## 2024-02-19 ENCOUNTER — READMISSION MANAGEMENT (OUTPATIENT)
Dept: CALL CENTER | Facility: HOSPITAL | Age: 59
End: 2024-02-19
Payer: MEDICARE

## 2024-02-19 NOTE — OUTREACH NOTE
Medical Week 1 Survey      Flowsheet Row Responses   [facility] facility patient discharged from? [facility]   Does the patient have one of the following disease processes/diagnoses(primary or secondary)? Other   Week 1 attempt successful? No   Unsuccessful attempts Attempt 1            Lissette Hartmann Registered Nurse

## 2024-02-21 ENCOUNTER — READMISSION MANAGEMENT (OUTPATIENT)
Dept: CALL CENTER | Facility: HOSPITAL | Age: 59
End: 2024-02-21
Payer: MEDICARE

## 2024-02-21 NOTE — OUTREACH NOTE
Medical Week 1 Survey      Flowsheet Row Responses   [facility] facility patient discharged from? [facility]   Does the patient have one of the following disease processes/diagnoses(primary or secondary)? Other   Week 1 attempt successful? No   Unsuccessful attempts Attempt 2            Nayeli DILLON - Registered Nurse

## 2024-02-26 ENCOUNTER — READMISSION MANAGEMENT (OUTPATIENT)
Dept: CALL CENTER | Facility: HOSPITAL | Age: 59
End: 2024-02-26
Payer: MEDICARE

## 2024-02-26 NOTE — OUTREACH NOTE
Medical Week 2 Survey      Flowsheet Row Responses   [facility] facility patient discharged from? [facility]   Does the patient have one of the following disease processes/diagnoses(primary or secondary)? Other   Week 2 attempt successful? No   Unsuccessful attempts Attempt 1            Nayeli HALEY - Registered Nurse

## 2024-02-28 ENCOUNTER — READMISSION MANAGEMENT (OUTPATIENT)
Dept: CALL CENTER | Facility: HOSPITAL | Age: 59
End: 2024-02-28
Payer: MEDICARE

## 2024-02-28 NOTE — OUTREACH NOTE
Medical Week 2 Survey      Flowsheet Row Responses   [facility] [place] patient discharged from? [facility]   Does the patient have one of the following disease processes/diagnoses(primary or secondary)? Other   Week 2 attempt successful? Yes   Call start time 1646   Discharge diagnosis Atrial fibrilaltion with RVR   Call end time 1653   Person spoke with today (if not patient) and relationship Wife   Is the patient taking all medications as directed (includes completed medication regime)? Yes   Medication comments PCP has started him on cefdinir   Does the patient have a primary care provider?  Yes   Has the patient kept scheduled appointments due by today? Yes   Psychosocial issues? No   What is the patient's perception of their health status since discharge? New symptoms unrelated to diagnosis   Is the patient/caregiver able to teach back signs and symptoms related to disease process for when to call PCP? Yes   Is the patient/caregiver able to teach back signs and symptoms related to disease process for when to call 911? Yes   Is the patient/caregiver able to teach back the hierarchy of who to call/visit for symptoms/problems? PCP, Specialist, Home health nurse, Urgent Care, ED, 911 Yes   Additional teach back comments States he has no energy.  Had appt today.  Blood sugars were in the 500s.  He has had a sleep study done and should get results tomorrow.  He uses oxygen PRN 2L with sats been staying at 97-98% on room air.  Has appt with cardiology 3/7.  If bs stay elevated advised to contact PCP.   Week 2 Call Completed? Yes   Wrap up additional comments If blood sugars stay elevated he is to contact his PCP   Call end time 1653            Ana CANTRELL - Licensed Nurse

## 2024-03-07 ENCOUNTER — OFFICE VISIT (OUTPATIENT)
Dept: CARDIOLOGY | Facility: CLINIC | Age: 59
End: 2024-03-07
Payer: MEDICARE

## 2024-03-07 VITALS
WEIGHT: 204.6 LBS | HEART RATE: 66 BPM | SYSTOLIC BLOOD PRESSURE: 103 MMHG | BODY MASS INDEX: 24.91 KG/M2 | HEIGHT: 76 IN | DIASTOLIC BLOOD PRESSURE: 68 MMHG

## 2024-03-07 DIAGNOSIS — E78.2 MIXED HYPERLIPIDEMIA: ICD-10-CM

## 2024-03-07 DIAGNOSIS — I10 ESSENTIAL HYPERTENSION: ICD-10-CM

## 2024-03-07 DIAGNOSIS — I51.9 LV DYSFUNCTION: ICD-10-CM

## 2024-03-07 DIAGNOSIS — I48.0 PAROXYSMAL ATRIAL FIBRILLATION: Primary | ICD-10-CM

## 2024-03-07 NOTE — PROGRESS NOTES
Chief Complaint  Hospital Follow Up Visit    Subjective            Fantasma Mcintosh presents to [facility] CARDIOLOGY  History of Present Illness    Mr. Mcintosh is here for hospital follow-up.  He has a history of paroxysmal atrial fibrillation and was previously on amiodarone however this was stopped due to thyroid dysfunction.  He presented to [facility] with progressive shortness of breath over several days.  EKG showed rapid atrial fibrillation and he was also positive for influenza A.  He was started on sotalol and underwent cardioversion which restored sinus rhythm.  His echo showed reduced LV function, ejection fraction 35%.  Since discharge, he is feeling a lot better.  He has shortness of breath at baseline and uses intermittent oxygen at home.  He does not feel like his heart is back out of rhythm.  He denies chest pain or palpitations.  No bleeding problems on anticoagulation.  Volume status stable.  He has some generalized weakness.    PMH  Past Medical History:   Diagnosis Date    Diabetes mellitus     Disease of thyroid gland          SURGICALHX  History reviewed. No pertinent surgical history.     SOC  Social History     Socioeconomic History    Marital status:    Tobacco Use    Smoking status: Every Day     Current packs/day: 1.00     Average packs/day: 1 pack/day for 15.0 years (15.0 ttl pk-yrs)     Types: Cigarettes    Smokeless tobacco: Never   Vaping Use    Vaping status: Never Used   Substance and Sexual Activity    Alcohol use: Not Currently    Drug use: Not Currently         FAMHX  History reviewed. No pertinent family history.       ALLERGY  Allergies   Allergen Reactions    Penicillins Itching        MEDSCURRENT    Current Outpatient Medications:     albuterol sulfate  (90 Base) MCG/ACT inhaler, Inhale 2 puffs Every 4 (Four) Hours As Needed., Disp: , Rfl:     aspirin 81 MG EC tablet, Take 1 tablet by mouth Daily., Disp: , Rfl:     carvedilol (COREG) 25  "MG tablet, Take 1 tablet by mouth 2 (Two) Times a Day With Meals for 30 days., Disp: 60 tablet, Rfl: 0    cetirizine (zyrTEC) 10 MG tablet, Take 1 tablet by mouth Daily., Disp: , Rfl:     Cholecalciferol 25 MCG (1000 UT) tablet, Take 2 tablets by mouth Daily., Disp: , Rfl:     dilTIAZem CD (CARDIZEM CD) 180 MG 24 hr capsule, Take 1 capsule by mouth Daily., Disp: 30 capsule, Rfl: 0    escitalopram (LEXAPRO) 20 MG tablet, Take 1 tablet by mouth Daily., Disp: , Rfl:     furosemide (LASIX) 40 MG tablet, Take 1 tablet by mouth Daily., Disp: , Rfl:     levothyroxine (SYNTHROID, LEVOTHROID) 50 MCG tablet, Take 1 tablet by mouth 2 (Two) Times a Day., Disp: , Rfl:     loratadine (CLARITIN) 10 MG tablet, Take 1 tablet by mouth Daily., Disp: , Rfl:     Omega-3 Fatty Acids (fish oil) 1200 MG capsule capsule, Take 1 capsule by mouth 2 (Two) Times a Day., Disp: , Rfl:     rosuvastatin (CRESTOR) 20 MG tablet, Take 1 tablet by mouth every night at bedtime., Disp: , Rfl:     sotalol (BETAPACE) 80 MG tablet, Take 1 tablet by mouth Every 12 (Twelve) Hours for 30 days., Disp: 60 tablet, Rfl: 0    spironolactone (ALDACTONE) 25 MG tablet, Take 1 tablet by mouth Daily., Disp: , Rfl:     Xarelto 20 MG tablet, Take 1 tablet by mouth Daily., Disp: , Rfl:       Review of Systems   Constitutional: Positive for malaise/fatigue.   Cardiovascular:  Positive for dyspnea on exertion. Negative for chest pain, leg swelling, palpitations and syncope.   Hematologic/Lymphatic: Negative for bleeding problem.   Neurological:  Positive for weakness.        Objective     /68   Pulse 66   Ht 193 cm (75.98\")   Wt 92.8 kg (204 lb 9.6 oz)   BMI 24.92 kg/m²       General Appearance:   well developed  well nourished  HENT:   oropharynx moist  lips not cyanotic  Neck:  thyroid not enlarged  supple  Respiratory:  no respiratory distress  normal breath sounds  no rales  Cardiovascular:  no jugular venous distention  regular rhythm  apical impulse " normal  S1 normal, S2 normal  no S3, no S4   no murmur  no rub, no thrill  carotid pulses normal; no bruit  pedal pulses normal  lower extremity edema: none    Musculoskeletal:  no clubbing of fingers.   normocephalic, head atraumatic  Skin:   warm, dry  Psychiatric:  judgement and insight appropriate  normal mood and affect      Result Review :     The following data was reviewed by: CARLOS Leggett on 03/07/2024:    CMP          2/13/2024    03:10 2/14/2024    04:05 2/15/2024    03:07   CMP   Glucose 163  139  195    BUN 15  15  16    Creatinine 0.86  0.91  1.10    EGFR 100.4  97.7  77.8    Sodium 137  136  136    Potassium 3.6  3.7  3.8    Chloride 94  95  95    Calcium 8.7  8.5  8.8    BUN/Creatinine Ratio 17.4  16.5  14.5    Anion Gap 10.6  8.8  9.9      CBC          2/13/2024    03:10 2/14/2024    04:05 2/15/2024    03:07   CBC   WBC 6.94  6.76  5.55    RBC 4.73  4.53  4.55    Hemoglobin 12.4  11.9  11.8    Hematocrit 39.5  37.8  38.1    MCV 83.5  83.4  83.7    MCH 26.2  26.3  25.9    MCHC 31.4  31.5  31.0    RDW 13.2  13.2  13.2    Platelets 144  153  179        TSH          6/6/2023    22:08   TSH   TSH 0.936        Data reviewed : Cardiology studies hospital notes reviewed.  Procedure and echocardiogram note reviewed.      Procedures      Fantasma Mcintosh  reports that he has been smoking cigarettes. He has a 15 pack-year smoking history. He has never used smokeless tobacco.. I have educated him on the risk of diseases from using tobacco products such as cancer, COPD, and heart disease.     I advised him to quit and he is not willing to quit.    I spent 3  minutes counseling the patient.                Assessment and Plan        ASSESSMENT:  Encounter Diagnoses   Name Primary?    Paroxysmal atrial fibrillation Yes    Mixed hyperlipidemia     Essential hypertension     LV dysfunction          PLAN:    1.  Paroxysmal atrial fibrillation-clinically maintaining sinus rhythm with sotalol and status post  cardioversion.  Continue sotalol and anticoagulation.  Echocardiogram showed moderately reduced LV function which likely is secondary to atrial fibrillation of unknown duration with rapid rates.  Continue beta-blocker, Aldactone.  Will repeat an echo in a couple more months to reevaluate LV function.  2.  Essential hypertension-stable, continue current medical therapy.  3.  Mixed hyperlipidemia stable on statin therapy.    Will recheck an echo in May and follow-up after.      Patient was given instructions and counseling regarding his condition or for health maintenance advice. Please see specific information pulled into the AVS if appropriate.           Nayeli Rivera, APRN   3/7/2024  11:44 EST

## 2024-03-08 ENCOUNTER — READMISSION MANAGEMENT (OUTPATIENT)
Dept: CALL CENTER | Facility: HOSPITAL | Age: 59
End: 2024-03-08
Payer: MEDICARE

## 2024-03-08 NOTE — OUTREACH NOTE
Medical Week 3 Survey      Flowsheet Row Responses   [facility] facility patient discharged from? [facility]   Does the patient have one of the following disease processes/diagnoses(primary or secondary)? Other   Week 3 attempt successful? No   Unsuccessful attempts Attempt 1            Vivian WOLFE - Registered Nurse   Physical Therapy Visit    Referred by: José Lopes MD; Medical Diagnosis (from order):    Diagnosis Information      Diagnosis    724.2 (ICD-9-CM) - M54.50 (ICD-10-CM) - Low back pain    724.9 (ICD-9-CM) - M43.26 (ICD-10-CM) - Fusion of spine, lumbar region    724.1 (ICD-9-CM) - M54.6 (ICD-10-CM) - Pain in thoracic spine    723.4 (ICD-9-CM) - M54.12 (ICD-10-CM) - Radiculopathy, cervical region              Visit: 2    Visit Type: Daily Treatment Note    SUBJECTIVE                                                                                                               Patient reports, \"the back is feeling real tight across the middle and there's like a sharp, stabbing pain on the right side.\"    OBJECTIVE                                                                                                                        TREATMENT                                                                                                                  Therapeutic Exercise:  PERFORMED  -Seated SB flexion x 10, 3s holds - mod incr in LBP and onset of R groin pain  -LTRs 2 x 10 ea side  -Supine piriformis stretch 3 x 10s BLEs  -HEP reviewed      NOT PERFORMED  -TrA contractions x 10, 3s holds  -TrA contractions w marches x 10 BLE    Manual Therapy:  PERFORMED  -STM to bilat lumbosacral paraspinals  -STM to bilat gluteals w emphasis on piriformis areas    Skilled input: verbal instruction/cues and tactile instruction/cues    Writer verbally educated and received verbal consent for hand placement, positioning of patient, and techniques to be performed today from patient for clothing adjustments for techniques, therapist position for techniques and hand placement and palpation for techniques as described above and how they are pertinent to the patient's plan of care.    Home Exercise Program/Education Materials: Access Code: [email or web address]  URL: [email or web address]  Date: 08/31/2022  Prepared by: Vianney  Aakash    Exercises  · Supine Lower Trunk Rotation - 2 x daily - 7 x weekly - 2 sets - 10 reps  · Supine March - 2 x daily - 7 x weekly - 2 sets - 10 reps  · Supine Transversus Abdominis Bracing - Hands on Stomach - 2 x daily - 7 x weekly - 2 sets - 10 reps - 3s hold  · Seated Transversus Abdominis Bracing - 2 x daily - 7 x weekly - 2 sets - 10 reps - 3s hold  · Supine Figure 4 Piriformis Stretch - 2-3 x daily - 7 x weekly - 1 sets - 3 reps - 10s hold              ASSESSMENT                                                                                                             Pt reported mild incr in symptoms since last week after changing bed sheets and performing household chores. Pt reports compliance w HEP w/o incr in symptoms. Pt performed seated SB flexion w mod incr in LBP and onset of mild R groin pain which decr'd after discontinuing activity. Therapist performed STM of bilat lumbosacral paraspinals and bilat gluteal regions w heavy emphasis on mobilizing bilat piriformis areas d/t severe tenderness and decr'd tissue extensibility. Pt continues to demonstrate impaired activity tolerance and impaired lumbar AROM secondary to pain. Therapist provided pt w updated HEP at end of session.     Patient Education:   Results of above outlined education: Verbalizes understanding and Demonstrates understanding      PLAN                                                                                                                           Suggestions for next session as indicated: Progress per plan of care         Therapy procedure time and total treatment time can be found documented on the Time Entry flowsheet

## 2024-03-10 LAB
QT INTERVAL: 378 MS
QTC INTERVAL: 439 MS

## 2024-03-11 LAB — QTC INTERVAL: NORMAL MS

## 2024-03-13 ENCOUNTER — READMISSION MANAGEMENT (OUTPATIENT)
Dept: CALL CENTER | Facility: HOSPITAL | Age: 59
End: 2024-03-13
Payer: MEDICARE

## 2024-03-13 NOTE — OUTREACH NOTE
"Medical Week 3 Survey      Flowsheet Row Responses   [facility] patient discharged from? [facility]   Does the patient have one of the following disease processes/diagnoses(primary or secondary)? Other   Week 3 attempt successful? Yes   Call start time 0818   Call end time 0821   Discharge diagnosis Atrial fibrilaltion with RVR   Person spoke with today (if not patient) and relationship mother   Meds reviewed with patient/caregiver? Yes   Is the patient taking all medications as directed (includes completed medication regime)? Yes   Does the patient have a primary care provider?  Yes   Does the patient have an appointment with their PCP within 7 days of discharge? Yes   Has the patient kept scheduled appointments due by today? Yes   Psychosocial issues? No   Did the patient receive a copy of their discharge instructions? Yes   Nursing interventions Reviewed instructions with patient   What is the patient's perception of their health status since discharge? Improving   Is the patient/caregiver able to teach back signs and symptoms related to disease process for when to call PCP? Yes   Is the patient/caregiver able to teach back signs and symptoms related to disease process for when to call 911? Yes   Is the patient/caregiver able to teach back the hierarchy of who to call/visit for symptoms/problems? PCP, Specialist, Home health nurse, Urgent Care, ED, 911 Yes   Week 3 Call Completed? Yes   Wrap up additional comments Mother reports that Pt is doing a little better now. States that he went to \"sleep Dr\" but has not been ordered a CPAP yet. She states he has his meds and has seen PCP and cardio.   Call end time 0821            KATRIN WILLIAM - Registered Nurse  "

## 2024-03-28 ENCOUNTER — TRANSCRIBE ORDERS (OUTPATIENT)
Dept: ADMINISTRATIVE | Facility: HOSPITAL | Age: 59
End: 2024-03-28
Payer: MEDICARE

## 2024-03-28 DIAGNOSIS — F17.210 CIGARETTE SMOKER: Primary | ICD-10-CM

## 2024-04-04 ENCOUNTER — TELEPHONE (OUTPATIENT)
Dept: CARDIOLOGY | Facility: CLINIC | Age: 59
End: 2024-04-04
Payer: MEDICARE

## 2024-04-04 NOTE — TELEPHONE ENCOUNTER
"  Caller: MARIZA CAROLINA    Relationship: Mother    Best call back number: [phone]     What is the best time to reach you: ANYTIME    Who are you requesting to speak with (clinical staff, provider,  specific staff member): CLINICAL    Do you know the name of the person who called: MARIZA    What was the call regarding: PT'S MOTHER STATED SHE WANTED TO TALK TO [facility] ABOUT THE PT'S TESTING SINCE THEY DON'T HAVE ANYTHING AVAILABLE UNTIL MAY. SHE WANTS TO KNOW \"WHAT THEY SHOULD DO ABOUT THIS.\"  ATTEMPTED TO RESCHEDULE THE APPT WITH YVONNE AFTER TESTING BUT SHE STATED THAT SHE WAS NOT OKAY WITH HOW FAR OUT THE TESTING WAS WITH THE PT'S ISSUES    Is it okay if the provider responds through MyChart: PLEASE CALL    "

## 2024-04-05 NOTE — TELEPHONE ENCOUNTER
I requested his echo to be completed in May so medications would have enough time to work and strengthen heart muscle before reassessing heart function.

## 2024-05-07 ENCOUNTER — OFFICE VISIT (OUTPATIENT)
Dept: CARDIOLOGY | Facility: CLINIC | Age: 59
End: 2024-05-07
Payer: MEDICARE

## 2024-05-07 VITALS
BODY MASS INDEX: 23.16 KG/M2 | HEART RATE: 76 BPM | SYSTOLIC BLOOD PRESSURE: 115 MMHG | DIASTOLIC BLOOD PRESSURE: 78 MMHG | WEIGHT: 190.2 LBS | HEIGHT: 76 IN

## 2024-05-07 DIAGNOSIS — E78.2 MIXED HYPERLIPIDEMIA: ICD-10-CM

## 2024-05-07 DIAGNOSIS — I51.9 LV DYSFUNCTION: ICD-10-CM

## 2024-05-07 DIAGNOSIS — I48.0 PAROXYSMAL ATRIAL FIBRILLATION: Primary | ICD-10-CM

## 2024-05-07 DIAGNOSIS — I10 ESSENTIAL HYPERTENSION: ICD-10-CM

## 2024-05-07 RX ORDER — GABAPENTIN 300 MG/1
300 CAPSULE ORAL 3 TIMES DAILY
COMMUNITY
Start: 2024-04-25

## 2024-05-07 RX ORDER — CARVEDILOL 12.5 MG/1
12.5 TABLET ORAL 2 TIMES DAILY WITH MEALS
Qty: 180 TABLET | Refills: 3 | Status: SHIPPED | OUTPATIENT
Start: 2024-05-07

## 2024-05-14 ENCOUNTER — HOSPITAL ENCOUNTER (OUTPATIENT)
Dept: CARDIOLOGY | Facility: HOSPITAL | Age: 59
Discharge: HOME | End: 2024-05-14
Payer: MEDICARE

## 2024-05-14 ENCOUNTER — HOSPITAL ENCOUNTER (OUTPATIENT)
Dept: CT IMAGING | Facility: HOSPITAL | Age: 59
Discharge: HOME | End: 2024-05-14
Payer: MEDICARE

## 2024-05-14 DIAGNOSIS — I51.9 LV DYSFUNCTION: ICD-10-CM

## 2024-05-14 DIAGNOSIS — I48.0 PAROXYSMAL ATRIAL FIBRILLATION: ICD-10-CM

## 2024-05-14 DIAGNOSIS — F17.210 CIGARETTE SMOKER: ICD-10-CM

## 2024-05-14 PROCEDURE — 71271 CT THORAX LUNG CANCER SCR C-: CPT

## 2024-05-14 PROCEDURE — 93306 TTE W/DOPPLER COMPLETE: CPT

## 2024-05-16 ENCOUNTER — TELEPHONE (OUTPATIENT)
Dept: CARDIOLOGY | Facility: CLINIC | Age: 59
End: 2024-05-16
Payer: MEDICARE

## 2024-05-16 LAB
BH CV ECHO LEFT VENTRICLE GLOBAL LONGITUDINAL STRAIN: -20.2 %
BH CV ECHO MEAS - AO ROOT DIAM: 3.1 CM
BH CV ECHO MEAS - EF(MOD-BP): 54 %
BH CV ECHO MEAS - IVSD: 0.7 CM
BH CV ECHO MEAS - LA DIMENSION: 3.7 CM
BH CV ECHO MEAS - LAT PEAK E' VEL: 7.7 CM/SEC
BH CV ECHO MEAS - LVIDD: 5.5 CM
BH CV ECHO MEAS - LVIDS: 3.7 CM
BH CV ECHO MEAS - LVPWD: 1 CM
BH CV ECHO MEAS - MED PEAK E' VEL: 5.8 CM/SEC
BH CV ECHO MEAS - MR VTI: 51 CM
BH CV ECHO MEAS - MV A MAX VEL: 0.8 CM/SEC
BH CV ECHO MEAS - MV DEC TIME: 252 SEC
BH CV ECHO MEAS - MV E MAX VEL: 0.6 CM/SEC
BH CV ECHO MEAS - MV E/A: 0.7
BH CV ECHO MEAS - TR MAX VEL: 2 CM/SEC
BH CV ECHO MEASUREMENTS AVERAGE E/E' RATIO: 0.09
LEFT ATRIUM VOLUME INDEX: 22 ML/M2

## 2024-05-16 NOTE — TELEPHONE ENCOUNTER
Марина Etienne APRN French, Tonya, JUAN  Echocardiogram shows normal left ventricular systolic function, EF 51 to 55%.  There is mild calcification to the aortic and mitral valves with only mild amount of regurgitation in the mitral valve.  Would recommend reevaluating with repeat echocardiogram in 2 to 3 years unless symptoms persist or worsen.  Follow-up as scheduled..      Attempted to call patient. No answer. VM left with return call requested.

## 2024-05-21 ENCOUNTER — OFFICE VISIT (OUTPATIENT)
Dept: CARDIOLOGY | Facility: CLINIC | Age: 59
End: 2024-05-21
Payer: MEDICARE

## 2024-05-21 VITALS
BODY MASS INDEX: 23.5 KG/M2 | HEART RATE: 62 BPM | HEIGHT: 76 IN | DIASTOLIC BLOOD PRESSURE: 35 MMHG | SYSTOLIC BLOOD PRESSURE: 71 MMHG | WEIGHT: 193 LBS

## 2024-05-21 DIAGNOSIS — I10 ESSENTIAL HYPERTENSION: ICD-10-CM

## 2024-05-21 DIAGNOSIS — E78.2 MIXED HYPERLIPIDEMIA: ICD-10-CM

## 2024-05-21 DIAGNOSIS — I48.0 PAROXYSMAL ATRIAL FIBRILLATION: Primary | ICD-10-CM

## 2024-05-21 DIAGNOSIS — I51.9 LV DYSFUNCTION: ICD-10-CM

## 2024-05-21 RX ORDER — SOTALOL HYDROCHLORIDE 80 MG/1
80 TABLET ORAL EVERY 12 HOURS SCHEDULED
Qty: 180 TABLET | Refills: 3 | Status: ON HOLD | OUTPATIENT
Start: 2024-05-21

## 2024-05-21 RX ORDER — SULFAMETHOXAZOLE AND TRIMETHOPRIM 800; 160 MG/1; MG/1
1 TABLET ORAL 2 TIMES DAILY
COMMUNITY
Start: 2024-05-15 | End: 2024-05-25 | Stop reason: HOSPADM

## 2024-05-21 NOTE — PROGRESS NOTES
Chief Complaint  Paroxysmal atrial fibrillation, <9>Mixed hyperlipidemia, and <9>Essential hypertension    Subjective            Fantasma Mcintosh presents to [facility] MEDICAL [facility] CARDIOLOGY  History of Present Illness    Mr. Mcintosh is here for follow-up evaluation management of paroxysmal atrial fibrillation, previously on amiodarone however this was stopped due to thyroid dysfunction.  He was recently hospitalized at [facility] due to rapid A-fib he was positive for influenza at that time.  He was started on sotalol and underwent cardioversion which restored sinus rhythm.  His EF at that time was 35%.  More recent echocardiogram showed improvement in EF to 51 to 55%.  Today his blood pressure is very low and he is feeling fatigued and low energy.  He denies chest pain, shortness of breath, or palpitations.    PMH  Past Medical History:   Diagnosis Date    Diabetes mellitus     Disease of thyroid gland          SURGICALHX  History reviewed. No pertinent surgical history.     SOC  Social History     Socioeconomic History    Marital status:    Tobacco Use    Smoking status: Every Day     Current packs/day: 1.00     Average packs/day: 1 pack/day for 15.0 years (15.0 ttl pk-yrs)     Types: Cigarettes    Smokeless tobacco: Never   Vaping Use    Vaping status: Never Used   Substance and Sexual Activity    Alcohol use: Not Currently    Drug use: Not Currently         FAMHX  History reviewed. No pertinent family history.       ALLERGY  Allergies   Allergen Reactions    Penicillins Itching        MEDSCURRENT    Current Outpatient Medications:     albuterol sulfate  (90 Base) MCG/ACT inhaler, Inhale 2 puffs Every 4 (Four) Hours As Needed., Disp: , Rfl:     aspirin 81 MG EC tablet, Take 1 tablet by mouth Daily., Disp: , Rfl:     carvedilol (COREG) 12.5 MG tablet, Take 1 tablet by mouth 2 (Two) Times a Day With Meals., Disp: 180 tablet, Rfl: 3    cetirizine (zyrTEC) 10 MG tablet, Take 1 tablet by  "mouth Daily., Disp: , Rfl:     Cholecalciferol 25 MCG (1000 UT) tablet, Take 2 tablets by mouth Daily., Disp: , Rfl:     escitalopram (LEXAPRO) 20 MG tablet, Take 1 tablet by mouth Daily., Disp: , Rfl:     furosemide (LASIX) 40 MG tablet, Take 1 tablet by mouth Daily., Disp: , Rfl:     gabapentin (NEURONTIN) 300 MG capsule, Take 1 capsule by mouth 3 (Three) Times a Day., Disp: , Rfl:     levothyroxine (SYNTHROID, LEVOTHROID) 50 MCG tablet, Take 1 tablet by mouth 2 (Two) Times a Day., Disp: , Rfl:     loratadine (CLARITIN) 10 MG tablet, Take 1 tablet by mouth Daily., Disp: , Rfl:     Omega-3 Fatty Acids (fish oil) 1200 MG capsule capsule, Take 1 capsule by mouth 2 (Two) Times a Day., Disp: , Rfl:     rosuvastatin (CRESTOR) 20 MG tablet, Take 1 tablet by mouth every night at bedtime., Disp: , Rfl:     sotalol (BETAPACE) 80 MG tablet, Take 1 tablet by mouth Every 12 (Twelve) Hours., Disp: 180 tablet, Rfl: 3    spironolactone (ALDACTONE) 25 MG tablet, Take 1 tablet by mouth Daily., Disp: , Rfl:     sulfamethoxazole-trimethoprim (BACTRIM DS,SEPTRA DS) 800-160 MG per tablet, Take 1 tablet by mouth 2 (Two) Times a Day., Disp: , Rfl:     Xarelto 20 MG tablet, Take 1 tablet by mouth Daily., Disp: , Rfl:       Review of Systems   Constitutional: Positive for malaise/fatigue.   Cardiovascular:  Negative for chest pain, dyspnea on exertion, irregular heartbeat, palpitations and syncope.   Hematologic/Lymphatic: Negative for bleeding problem.        Objective     BP (!) 71/35   Pulse 62   Ht 193 cm (75.98\")   Wt 87.5 kg (193 lb)   BMI 23.51 kg/m²       General Appearance:   well developed  well nourished  HENT:   oropharynx moist  lips not cyanotic  Neck:  thyroid not enlarged  supple  Respiratory:  no respiratory distress  normal breath sounds  no rales  Cardiovascular:  no jugular venous distention  regular rhythm  apical impulse normal  S1 normal, S2 normal  no S3, no S4   no murmur  no rub, no thrill  carotid pulses " normal; no bruit  pedal pulses normal  lower extremity edema: none    Musculoskeletal:  no clubbing of fingers.   normocephalic, head atraumatic  Skin:   warm, dry  Psychiatric:  judgement and insight appropriate  normal mood and affect      Result Review :     The following data was reviewed by: CARLOS Leggett on 05/21/2024:    CMP          2/13/2024    03:10 2/14/2024    04:05 2/15/2024    03:07   CMP   Glucose 163  139  195    BUN 15  15  16    Creatinine 0.86  0.91  1.10    EGFR 100.4  97.7  77.8    Sodium 137  136  136    Potassium 3.6  3.7  3.8    Chloride 94  95  95    Calcium 8.7  8.5  8.8    BUN/Creatinine Ratio 17.4  16.5  14.5    Anion Gap 10.6  8.8  9.9      CBC          2/13/2024    03:10 2/14/2024    04:05 2/15/2024    03:07   CBC   WBC 6.94  6.76  5.55    RBC 4.73  4.53  4.55    Hemoglobin 12.4  11.9  11.8    Hematocrit 39.5  37.8  38.1    MCV 83.5  83.4  83.7    MCH 26.2  26.3  25.9    MCHC 31.4  31.5  31.0    RDW 13.2  13.2  13.2    Platelets 144  153  179        TSH          6/6/2023    22:08   TSH   TSH 0.936             Procedures      Fantasma Mcintosh  reports that he has been smoking cigarettes. He has a 15 pack-year smoking history. He has never used smokeless tobacco. I have educated him on the risk of diseases from using tobacco products such as cancer, COPD, and heart disease.     I advised him to quit and he is not willing to quit.    I spent 3  minutes counseling the patient.                Assessment and Plan        ASSESSMENT:  Encounter Diagnoses   Name Primary?    Paroxysmal atrial fibrillation Yes    LV dysfunction     Essential hypertension     Mixed hyperlipidemia          PLAN:    1.  Paroxysmal atrial fibrillation-clinically maintaining sinus rhythm after cardioversion.  He had a 30-day prescription at discharge and ran out.  I am refilling this today and asked him to start it as soon as possible.  He is still regular on exam today.  His blood pressure is very low.  He  is on both diltiazem and carvedilol.  I am stopping diltiazem today.  He will hold carvedilol dose tonight and resume in the morning if blood pressure is stable.  Continue anticoagulation.  2.  LV dysfunction-35% while hospitalized, EF is improved to 51 to 55%.  Continue current medical therapy as blood pressure tolerates.  3.  Essential hypertension-as above.  4.  Mixed hyperlipidemia stable on statin therapy.    Follow-up arranged.      Patient was given instructions and counseling regarding his condition or for health maintenance advice. Please see specific information pulled into the AVS if appropriate.           Nayeli Rivera, APRN   5/21/2024  14:32 EDT

## 2024-05-22 ENCOUNTER — APPOINTMENT (OUTPATIENT)
Dept: GENERAL RADIOLOGY | Facility: HOSPITAL | Age: 59
DRG: 312 | End: 2024-05-22
Payer: MEDICARE

## 2024-05-22 ENCOUNTER — TELEPHONE (OUTPATIENT)
Dept: CARDIOLOGY | Facility: CLINIC | Age: 59
End: 2024-05-22
Payer: MEDICARE

## 2024-05-22 ENCOUNTER — APPOINTMENT (OUTPATIENT)
Dept: CT IMAGING | Facility: HOSPITAL | Age: 59
DRG: 312 | End: 2024-05-22
Payer: MEDICARE

## 2024-05-22 ENCOUNTER — HOSPITAL ENCOUNTER (INPATIENT)
Facility: HOSPITAL | Age: 59
LOS: 3 days | Discharge: HOME | DRG: 312 | End: 2024-05-25
Attending: EMERGENCY MEDICINE | Admitting: FAMILY MEDICINE
Payer: MEDICARE

## 2024-05-22 DIAGNOSIS — R50.9 FEBRILE ILLNESS: ICD-10-CM

## 2024-05-22 DIAGNOSIS — Z78.9 DECREASED ACTIVITIES OF DAILY LIVING (ADL): ICD-10-CM

## 2024-05-22 DIAGNOSIS — I95.9 HYPOTENSION, UNSPECIFIED HYPOTENSION TYPE: ICD-10-CM

## 2024-05-22 DIAGNOSIS — R50.9 ACUTE FEBRILE ILLNESS: ICD-10-CM

## 2024-05-22 DIAGNOSIS — R73.9 HYPERGLYCEMIA: Primary | ICD-10-CM

## 2024-05-22 DIAGNOSIS — R26.2 DIFFICULTY WALKING: ICD-10-CM

## 2024-05-22 LAB
ALBUMIN SERPL-MCNC: 4 G/DL (ref 3.5–5.2)
ALBUMIN/GLOB SERPL: 1 G/DL
ALP SERPL-CCNC: 120 U/L (ref 39–117)
ALT SERPL W P-5'-P-CCNC: 55 U/L (ref 1–41)
ANION GAP SERPL CALCULATED.3IONS-SCNC: 11.8 MMOL/L (ref 5–15)
AST SERPL-CCNC: 33 U/L (ref 1–40)
BACTERIA UR QL AUTO: NORMAL /HPF
BASOPHILS # BLD AUTO: 0.01 10*3/MM3 (ref 0–0.2)
BASOPHILS NFR BLD AUTO: 0.2 % (ref 0–1.5)
BILIRUB SERPL-MCNC: 0.4 MG/DL (ref 0–1.2)
BILIRUB UR QL STRIP: NEGATIVE
BUN SERPL-MCNC: 20 MG/DL (ref 6–20)
BUN/CREAT SERPL: 11.5 (ref 7–25)
CALCIUM SPEC-SCNC: 8.6 MG/DL (ref 8.6–10.5)
CHLORIDE SERPL-SCNC: 90 MMOL/L (ref 98–107)
CLARITY UR: CLEAR
CO2 SERPL-SCNC: 27.2 MMOL/L (ref 22–29)
COLOR UR: YELLOW
CREAT SERPL-MCNC: 1.74 MG/DL (ref 0.76–1.27)
D-LACTATE SERPL-SCNC: 1 MMOL/L (ref 0.5–2)
D-LACTATE SERPL-SCNC: 2.3 MMOL/L (ref 0.5–2)
DEPRECATED RDW RBC AUTO: 46.4 FL (ref 37–54)
EGFRCR SERPLBLD CKD-EPI 2021: 44.9 ML/MIN/1.73
EOSINOPHIL # BLD AUTO: 0.29 10*3/MM3 (ref 0–0.4)
EOSINOPHIL NFR BLD AUTO: 4.7 % (ref 0.3–6.2)
ERYTHROCYTE [DISTWIDTH] IN BLOOD BY AUTOMATED COUNT: 15.3 % (ref 12.3–15.4)
FLUAV SUBTYP SPEC NAA+PROBE: NOT DETECTED
FLUBV RNA ISLT QL NAA+PROBE: NOT DETECTED
GLOBULIN UR ELPH-MCNC: 4 GM/DL
GLUCOSE BLDC GLUCOMTR-MCNC: 183 MG/DL (ref 70–99)
GLUCOSE BLDC GLUCOMTR-MCNC: 212 MG/DL (ref 70–99)
GLUCOSE BLDC GLUCOMTR-MCNC: 376 MG/DL (ref 70–99)
GLUCOSE SERPL-MCNC: 538 MG/DL (ref 65–99)
GLUCOSE UR STRIP-MCNC: ABNORMAL MG/DL
HCT VFR BLD AUTO: 39.1 % (ref 37.5–51)
HGB BLD-MCNC: 13 G/DL (ref 13–17.7)
HGB UR QL STRIP.AUTO: ABNORMAL
HOLD SPECIMEN: NORMAL
HOLD SPECIMEN: NORMAL
HYALINE CASTS UR QL AUTO: NORMAL /LPF
IMM GRANULOCYTES # BLD AUTO: 0.01 10*3/MM3 (ref 0–0.05)
IMM GRANULOCYTES NFR BLD AUTO: 0.2 % (ref 0–0.5)
KETONES UR QL STRIP: NEGATIVE
LEUKOCYTE ESTERASE UR QL STRIP.AUTO: NEGATIVE
LYMPHOCYTES # BLD AUTO: 0.64 10*3/MM3 (ref 0.7–3.1)
LYMPHOCYTES NFR BLD AUTO: 10.4 % (ref 19.6–45.3)
MAGNESIUM SERPL-MCNC: 2 MG/DL (ref 1.6–2.6)
MCH RBC QN AUTO: 27.5 PG (ref 26.6–33)
MCHC RBC AUTO-ENTMCNC: 33.2 G/DL (ref 31.5–35.7)
MCV RBC AUTO: 82.8 FL (ref 79–97)
MONOCYTES # BLD AUTO: 0.59 10*3/MM3 (ref 0.1–0.9)
MONOCYTES NFR BLD AUTO: 9.6 % (ref 5–12)
NEUTROPHILS NFR BLD AUTO: 4.62 10*3/MM3 (ref 1.7–7)
NEUTROPHILS NFR BLD AUTO: 74.9 % (ref 42.7–76)
NITRITE UR QL STRIP: NEGATIVE
NRBC BLD AUTO-RTO: 0 /100 WBC (ref 0–0.2)
PH UR STRIP.AUTO: 5.5 [PH] (ref 5–8)
PLATELET # BLD AUTO: 124 10*3/MM3 (ref 140–450)
PMV BLD AUTO: 11.4 FL (ref 6–12)
POTASSIUM SERPL-SCNC: 4.6 MMOL/L (ref 3.5–5.2)
PROT SERPL-MCNC: 8 G/DL (ref 6–8.5)
PROT UR QL STRIP: NEGATIVE
RBC # BLD AUTO: 4.72 10*6/MM3 (ref 4.14–5.8)
RBC # UR STRIP: NORMAL /HPF
REF LAB TEST METHOD: NORMAL
RSV RNA NPH QL NAA+NON-PROBE: NOT DETECTED
SARS-COV-2 RNA RESP QL NAA+PROBE: NOT DETECTED
SODIUM SERPL-SCNC: 129 MMOL/L (ref 136–145)
SP GR UR STRIP: 1.02 (ref 1–1.03)
SQUAMOUS #/AREA URNS HPF: NORMAL /HPF
TROPONIN T SERPL HS-MCNC: 27 NG/L
UROBILINOGEN UR QL STRIP: ABNORMAL
WBC # UR STRIP: NORMAL /HPF
WBC NRBC COR # BLD AUTO: 6.16 10*3/MM3 (ref 3.4–10.8)
WHOLE BLOOD HOLD COAG: NORMAL
WHOLE BLOOD HOLD SPECIMEN: NORMAL

## 2024-05-22 PROCEDURE — 93010 ELECTROCARDIOGRAM REPORT: CPT | Performed by: INTERNAL MEDICINE

## 2024-05-22 PROCEDURE — 70450 CT HEAD/BRAIN W/O DYE: CPT

## 2024-05-22 PROCEDURE — 83605 ASSAY OF LACTIC ACID: CPT | Performed by: EMERGENCY MEDICINE

## 2024-05-22 PROCEDURE — 82948 REAGENT STRIP/BLOOD GLUCOSE: CPT

## 2024-05-22 PROCEDURE — 83735 ASSAY OF MAGNESIUM: CPT | Performed by: EMERGENCY MEDICINE

## 2024-05-22 PROCEDURE — 63710000001 INSULIN REGULAR HUMAN PER 5 UNITS: Performed by: EMERGENCY MEDICINE

## 2024-05-22 PROCEDURE — 93005 ELECTROCARDIOGRAM TRACING: CPT | Performed by: EMERGENCY MEDICINE

## 2024-05-22 PROCEDURE — 25810000003 SEPSIS FLUID NS 0.9 % SOLUTION: Performed by: EMERGENCY MEDICINE

## 2024-05-22 PROCEDURE — 84484 ASSAY OF TROPONIN QUANT: CPT | Performed by: EMERGENCY MEDICINE

## 2024-05-22 PROCEDURE — 99285 EMERGENCY DEPT VISIT HI MDM: CPT

## 2024-05-22 PROCEDURE — 87637 SARSCOV2&INF A&B&RSV AMP PRB: CPT | Performed by: EMERGENCY MEDICINE

## 2024-05-22 PROCEDURE — 36415 COLL VENOUS BLD VENIPUNCTURE: CPT | Performed by: EMERGENCY MEDICINE

## 2024-05-22 PROCEDURE — 81001 URINALYSIS AUTO W/SCOPE: CPT | Performed by: EMERGENCY MEDICINE

## 2024-05-22 PROCEDURE — 85025 COMPLETE CBC W/AUTO DIFF WBC: CPT | Performed by: EMERGENCY MEDICINE

## 2024-05-22 PROCEDURE — 83036 HEMOGLOBIN GLYCOSYLATED A1C: CPT | Performed by: FAMILY MEDICINE

## 2024-05-22 PROCEDURE — 87040 BLOOD CULTURE FOR BACTERIA: CPT | Performed by: EMERGENCY MEDICINE

## 2024-05-22 PROCEDURE — 71045 X-RAY EXAM CHEST 1 VIEW: CPT

## 2024-05-22 PROCEDURE — 93005 ELECTROCARDIOGRAM TRACING: CPT

## 2024-05-22 PROCEDURE — 82948 REAGENT STRIP/BLOOD GLUCOSE: CPT | Performed by: EMERGENCY MEDICINE

## 2024-05-22 PROCEDURE — 25010000002 CEFTRIAXONE PER 250 MG: Performed by: EMERGENCY MEDICINE

## 2024-05-22 PROCEDURE — 80053 COMPREHEN METABOLIC PANEL: CPT | Performed by: EMERGENCY MEDICINE

## 2024-05-22 RX ORDER — LEVOTHYROXINE SODIUM 0.05 MG/1
100 TABLET ORAL
COMMUNITY

## 2024-05-22 RX ORDER — EMPAGLIFLOZIN 25 MG/1
1 TABLET, FILM COATED ORAL DAILY
COMMUNITY

## 2024-05-22 RX ORDER — SODIUM CHLORIDE 0.9 % (FLUSH) 0.9 %
10 SYRINGE (ML) INJECTION AS NEEDED
Status: DISCONTINUED | OUTPATIENT
Start: 2024-05-22 | End: 2024-05-25 | Stop reason: HOSPADM

## 2024-05-22 RX ORDER — BUDESONIDE 0.5 MG/2ML
0.5 INHALANT ORAL
COMMUNITY
Start: 2024-02-22

## 2024-05-22 RX ADMIN — INSULIN HUMAN 9 UNITS: 100 INJECTION, SOLUTION PARENTERAL at 21:20

## 2024-05-22 RX ADMIN — CEFTRIAXONE SODIUM 1000 MG: 1 INJECTION, POWDER, FOR SOLUTION INTRAMUSCULAR; INTRAVENOUS at 21:19

## 2024-05-22 RX ADMIN — SODIUM CHLORIDE 2733 ML: 9 INJECTION, SOLUTION INTRAVENOUS at 21:19

## 2024-05-22 NOTE — TELEPHONE ENCOUNTER
Caller: ENA CAROLINA    Relationship: Other    Best call back number: [phone]    What is the best time to reach you: ANYTIME    Who are you requesting to speak with (clinical staff, provider,  specific staff member): ANYONE    Do you know the name of the person who called:     What was the call regarding: ENA CALLED BACK IN STATING PATIENT DRANK TOMATO JUICE AND HAD SOME SALT. IT HAS BROUGHT UP HIS BLOOD PRESSURE BUT SHE'S NOT SURE HOW LONG HIS BLOOD PRESSURE WILL STAY UP. PATIENT'S BLOOD PRESSURE AFTER TOMATO JUICE AND SALT 112/70.    PATIENT HAS FELL FOUR TIMES SINCE LAST NIGHT JUST GETTING UP TO GO TO THE BATHROOM.     Is it okay if the provider responds through [employer]: NO, PLEASE CALL.

## 2024-05-22 NOTE — ED NOTES
Pt given urinal and educated on importance of urine sample. Pt attempted to get sample with no luck.

## 2024-05-22 NOTE — ED PROVIDER NOTES
Time: 7:23 PM EDT  Date of encounter:  5/22/2024  Independent Historian/Clinical History and Information was obtained by:   Patient and Family    History is limited by: N/A    Chief Complaint: Weakness, low blood pressure and frequent falls.      History of Present Illness:  Patient is a 59 y.o. year old male who presents to the emergency department for evaluation of weakness, low blood pressure and frequent falls.  This patient has a history of hypertension, chronic paroxysmal atrial fibrillation, acute on chronic heart failure who was admitted to the hospital in February 2024 for atrial fibrillation.  The patient had cardioversion while he was admitted to the hospital.  The patient was seen yesterday in cardiology clinic was noted to be on Cardizem and carvedilol.  The Cardizem was stopped by the nurse practitioner at the cardiologist office.  The patient presents today after  it was noted that his blood pressure was 71/35.  In addition the patient has developed a fever over the last several days.  The patient has had no head trauma despite having the recent falls and he has had no confusion.  The family did have some communication with the cardiologist office.  Other than the frequent falls, low blood pressure and generalized weakness the patient has no new infectious complaints such as headache, sore throat runny nose congestion or cough.  He said no vomiting or diarrhea      HPI    Patient Care Team  Primary Care Provider: Anish Alanis MD    Past Medical History:     Allergies   Allergen Reactions    Penicillins Itching     Past Medical History:   Diagnosis Date    Diabetes mellitus     Disease of thyroid gland      History reviewed. No pertinent surgical history.  History reviewed. No pertinent family history.    Home Medications:  Prior to Admission medications    Medication Sig Start Date End Date Taking? Authorizing Provider   albuterol sulfate  (90 Base) MCG/ACT inhaler Inhale 2 puffs  Every 4 (Four) Hours As Needed. 2/8/24   Keli Hernandez MD   aspirin 81 MG EC tablet Take 1 tablet by mouth Daily.    Keli Hernandez MD   carvedilol (COREG) 12.5 MG tablet Take 1 tablet by mouth 2 (Two) Times a Day With Meals. 5/7/24   Nayeli Rivera APRN   cetirizine (zyrTEC) 10 MG tablet Take 1 tablet by mouth Daily.    Keli Hernandez MD   Cholecalciferol 25 MCG (1000 UT) tablet Take 2 tablets by mouth Daily.    Keli Hernandez MD   escitalopram (LEXAPRO) 20 MG tablet Take 1 tablet by mouth Daily.    Keli Hernandez MD   furosemide (LASIX) 40 MG tablet Take 1 tablet by mouth Daily.    Keli Hernandez MD   gabapentin (NEURONTIN) 300 MG capsule Take 1 capsule by mouth 3 (Three) Times a Day. 4/25/24   Keli Hernandez MD   levothyroxine (SYNTHROID, LEVOTHROID) 50 MCG tablet Take 1 tablet by mouth 2 (Two) Times a Day.    Keli Hernandez MD   loratadine (CLARITIN) 10 MG tablet Take 1 tablet by mouth Daily. 2/8/24   Keli Hernandez MD   Omega-3 Fatty Acids (fish oil) 1200 MG capsule capsule Take 1 capsule by mouth 2 (Two) Times a Day.    Keli Hernandez MD   rosuvastatin (CRESTOR) 20 MG tablet Take 1 tablet by mouth every night at bedtime.    Keli Hernandez MD   sotalol (BETAPACE) 80 MG tablet Take 1 tablet by mouth Every 12 (Twelve) Hours. 5/21/24   Nayeli Rivera APRN   spironolactone (ALDACTONE) 25 MG tablet Take 1 tablet by mouth Daily.    Keli Hernandez MD   sulfamethoxazole-trimethoprim (BACTRIM DS,SEPTRA DS) 800-160 MG per tablet Take 1 tablet by mouth 2 (Two) Times a Day. 5/15/24   Keli Hernandez MD   Xarelto 20 MG tablet Take 1 tablet by mouth Daily.    Keli Hernandez MD        Social History:   Social History     Tobacco Use    Smoking status: Every Day     Current packs/day: 1.00     Average packs/day: 1 pack/day for 15.0 years (15.0 ttl pk-yrs)     Types: Cigarettes    Smokeless tobacco: Never   Vaping  "Use    Vaping status: Never Used   Substance Use Topics    Alcohol use: Not Currently    Drug use: Not Currently         Review of Systems:  Review of Systems   Constitutional:  Positive for activity change, appetite change, chills, fatigue and fever.   HENT:  Negative for congestion, ear pain and sore throat.    Eyes:  Negative for pain.   Respiratory:  Negative for cough, chest tightness and shortness of breath.    Cardiovascular:  Negative for chest pain.   Gastrointestinal:  Negative for abdominal pain, diarrhea, nausea and vomiting.   Genitourinary:  Negative for flank pain and hematuria.   Musculoskeletal:  Negative for joint swelling.   Skin:  Negative for pallor.   Neurological:  Positive for weakness. Negative for seizures and headaches.   All other systems reviewed and are negative.       Physical Exam:  /73   Pulse 75   Temp (!) 102.9 °F (39.4 °C)   Resp 18   Ht 193 cm (76\")   Wt 91.1 kg (200 lb 13.4 oz)   SpO2 92%   BMI 24.45 kg/m²     Physical Exam  Vitals and nursing note reviewed.   Constitutional:       General: He is not in acute distress.     Appearance: Normal appearance. He is not toxic-appearing.   HENT:      Head: Normocephalic and atraumatic.      Right Ear: External ear normal.      Left Ear: External ear normal.      Mouth/Throat:      Mouth: Mucous membranes are moist.   Eyes:      General: No scleral icterus.  Cardiovascular:      Rate and Rhythm: Normal rate and regular rhythm.      Pulses: Normal pulses.      Heart sounds: Normal heart sounds.   Pulmonary:      Effort: Pulmonary effort is normal. No respiratory distress.      Breath sounds: Normal breath sounds.   Abdominal:      General: Abdomen is flat.      Palpations: Abdomen is soft.      Tenderness: There is no abdominal tenderness.   Musculoskeletal:         General: Normal range of motion.      Cervical back: Normal range of motion and neck supple.   Skin:     General: Skin is warm and dry.      Capillary Refill: " Capillary refill takes less than 2 seconds.   Neurological:      General: No focal deficit present.      Mental Status: He is alert and oriented to person, place, and time. Mental status is at baseline.      Motor: Weakness present.      Comments: The patient is generalized weakness without focal findings.                  Procedures:  Procedures      Medical Decision Making:      Comorbidities that affect care:    Atrial Fibrillation    External Notes reviewed:    Previous Admission Note: Recent admission for atrial fibrillation in February of this year.      The following orders were placed and all results were independently analyzed by me:  Orders Placed This Encounter   Procedures    Blood Culture - Blood,    Blood Culture - Blood,    COVID-19, FLU A/B, RSV PCR 1 HR TAT - Swab, Nasopharynx    XR Chest 1 View    CT Head Without Contrast    [place] Draw    Comprehensive Metabolic Panel    Single High Sensitivity Troponin T    Magnesium    Urinalysis With Microscopic If Indicated (No Culture) - Urine, Clean Catch    CBC Auto Differential    Lactic Acid, Plasma    STAT Lactic Acid, Reflex    Urinalysis, Microscopic Only - Urine, Clean Catch    NPO Diet NPO Type: Strict NPO    Undress & Gown    Continuous Pulse Oximetry    Vital Signs    Orthostatic Blood Pressure    Hospitalist (on-call MD unless specified)    Oxygen Therapy- Nasal Cannula; Titrate 1-6 LPM Per SpO2; 90 - 95%    POC Glucose Once    POC Glucose Q1H    ECG 12 Lead ED Triage Standing Order; Weak / Dizzy / AMS    Insert Peripheral IV    Inpatient Admission    Fall Precautions    CBC & Differential    Green Top (Gel)    Lavender Top    Gold Top - SST    Light Blue Top       Medications Given in the Emergency Department:  Medications   sodium chloride 0.9 % flush 10 mL (has no administration in time range)   sepsis fluid NS 0.9 % bolus 2,733 mL (2,733 mL Intravenous New Bag 5/22/24 2119)   cefTRIAXone (ROCEPHIN) 1,000 mg in sodium chloride 0.9 % 100 mL  IVPB-VTB (0 mg Intravenous Stopped 5/22/24 2233)   insulin regular (humuLIN R,novoLIN R) injection 9 Units (9 Units Intravenous Given 5/22/24 2120)        ED Course:         EKG:  Sinus rhythm with a rate of 75 bpm  Left bundle branch block  Nonspecific ST changes  Left axis deviation    Labs:    Lab Results (last 24 hours)       Procedure Component Value Units Date/Time    CBC & Differential [[id number]]  (Abnormal) Collected: 05/22/24 1737    Specimen: Blood Updated: 05/22/24 1805    Narrative:      The following orders were created for panel order CBC & Differential.  Procedure                               Abnormality         Status                     ---------                               -----------         ------                     CBC Auto Differential[[id number]]        Abnormal            Final result               Scan Slide[[id number]]                                                                    Please view results for these tests on the individual orders.    Comprehensive Metabolic Panel [[id number]]  (Abnormal) Collected: 05/22/24 1737    Specimen: Blood Updated: 05/22/24 1814     Glucose 538 mg/dL      BUN 20 mg/dL      Creatinine 1.74 mg/dL      Sodium 129 mmol/L      Potassium 4.6 mmol/L      Chloride 90 mmol/L      CO2 27.2 mmol/L      Calcium 8.6 mg/dL      Total Protein 8.0 g/dL      Albumin 4.0 g/dL      ALT (SGPT) 55 U/L      AST (SGOT) 33 U/L      Alkaline Phosphatase 120 U/L      Total Bilirubin 0.4 mg/dL      Globulin 4.0 gm/dL      A/G Ratio 1.0 g/dL      BUN/Creatinine Ratio 11.5     Anion Gap 11.8 mmol/L      eGFR 44.9 mL/min/1.73     Narrative:      GFR Normal >60  Chronic Kidney Disease <60  Kidney Failure <15      Single High Sensitivity Troponin T [[id number]]  (Abnormal) Collected: 05/22/24 1737    Specimen: Blood Updated: 05/22/24 1802     HS Troponin T 27 ng/L     Narrative:      High Sensitive Troponin T Reference Range:  <14.0 ng/L- Negative Female for AMI  <22.0 ng/L-  Negative Male for AMI  >=14 - Abnormal Female indicating possible myocardial injury.  >=22 - Abnormal Male indicating possible myocardial injury.   Clinicians would have to utilize clinical acumen, EKG, Troponin, and serial changes to determine if it is an Acute Myocardial Infarction or myocardial injury due to an underlying chronic condition.         Magnesium [[id number]]  (Normal) Collected: 05/22/24 1737    Specimen: Blood Updated: 05/22/24 1814     Magnesium 2.0 mg/dL     CBC Auto Differential [[id number]]  (Abnormal) Collected: 05/22/24 1737    Specimen: Blood Updated: 05/22/24 1805     WBC 6.16 10*3/mm3      RBC 4.72 10*6/mm3      Hemoglobin 13.0 g/dL      Hematocrit 39.1 %      MCV 82.8 fL      MCH 27.5 pg      MCHC 33.2 g/dL      RDW 15.3 %      RDW-SD 46.4 fl      MPV 11.4 fL      Platelets 124 10*3/mm3      Neutrophil % 74.9 %      Lymphocyte % 10.4 %      Monocyte % 9.6 %      Eosinophil % 4.7 %      Basophil % 0.2 %      Immature Grans % 0.2 %      Neutrophils, Absolute 4.62 10*3/mm3      Lymphocytes, Absolute 0.64 10*3/mm3      Monocytes, Absolute 0.59 10*3/mm3      Eosinophils, Absolute 0.29 10*3/mm3      Basophils, Absolute 0.01 10*3/mm3      Immature Grans, Absolute 0.01 10*3/mm3      nRBC 0.0 /100 WBC     Lactic Acid, Plasma [[id number]]  (Abnormal) Collected: 05/22/24 1738    Specimen: Blood Updated: 05/22/24 1814     Lactate 2.3 mmol/L     Urinalysis With Microscopic If Indicated (No Culture) - Urine, Clean Catch [[id number]]  (Abnormal) Collected: 05/22/24 2004    Specimen: Urine, Clean Catch Updated: 05/22/24 2025     Color, UA Yellow     Appearance, UA Clear     pH, UA 5.5     Specific Gravity, UA 1.024     Glucose, UA >=1000 mg/dL (3+)     Ketones, UA Negative     Bilirubin, UA Negative     Blood, UA Trace     Protein, UA Negative     Leuk Esterase, UA Negative     Nitrite, UA Negative     Urobilinogen, UA 0.2 E.U./dL    Urinalysis, Microscopic Only - Urine, Clean Catch [[id number]] Collected:  05/22/24 2004    Specimen: Urine, Clean Catch Updated: 05/22/24 2051     RBC, UA 0-2 /HPF      WBC, UA 0-2 /HPF      Bacteria, UA None Seen /HPF      Squamous Epithelial Cells, UA 0-2 /HPF      Hyaline Casts, UA 0-2 /LPF      Methodology Automated Microscopy    COVID-19, FLU A/B, RSV PCR 1 HR TAT - Swab, Nasopharynx [[id number]]  (Normal) Collected: 05/22/24 2006    Specimen: Swab from Nasopharynx Updated: 05/22/24 2048     COVID19 Not Detected     Influenza A PCR Not Detected     Influenza B PCR Not Detected     RSV, PCR Not Detected    Narrative:      Fact sheet for providers: https://www.fda.gov/media/793970/download    Fact sheet for patients: https://www.fda.gov/media/971651/download    Test performed by PCR.    STAT Lactic Acid, Reflex [[id number]]  (Normal) Collected: 05/22/24 2104    Specimen: Blood from Hand, Right Updated: 05/22/24 2128     Lactate 1.0 mmol/L     Blood Culture - Blood, Hand, Left [[id number]] Collected: 05/22/24 2104    Specimen: Blood from Hand, Left Updated: 05/22/24 2107    Blood Culture - Blood, Hand, Right [[id number]] Collected: 05/22/24 2104    Specimen: Blood from Hand, Right Updated: 05/22/24 2107    POC Glucose Q1H [[id number]]  (Abnormal) Collected: 05/22/24 2122    Specimen: Blood Updated: 05/22/24 2123     Glucose 376 mg/dL      Comment: Serial Number: [id number]:  [id number]       POC Glucose Q1H [[id number]]  (Abnormal) Collected: 05/22/24 2219    Specimen: Blood Updated: 05/22/24 2220     Glucose 212 mg/dL      Comment: Serial Number: [id number]:  [id number]                Imaging:    CT Head Without Contrast    Result Date: 5/22/2024  CT HEAD WO CONTRAST-  Date of Exam: 5/22/2024 7:44 PM  Indication: Recent fall and weakness.  Comparison: None available.  Technique: Axial CT images were obtained of the head without contrast administration.  Reconstructed coronal and sagittal images were also obtained. Automated exposure control and iterative construction methods were  used.   Findings: There is no acute infarct or hemorrhage. No abnormal extra-axial fluid collections are seen. No mass effect or hydrocephalus.  Visualized paranasal sinuses and mastoid air cells are clear. No acute skull fracture.  The globes and orbits are within normal limits.      Impression:  1. No acute intracranial abnormality.    Electronically Signed By-Evin Black MD On:5/22/2024 7:54 PM      XR Chest 1 View    Result Date: 5/22/2024  XR CHEST 1 VW-  Date of Exam: 5/22/2024 5:34 PM  Indication: Weak/Dizzy/AMS triage protocol  Comparison: 2/14/2024  Findings: Heart size and pulmonary vessels are within normal limits. Lungs are clear bilaterally. No pleural effusion. No pneumothorax. Bony structures unremarkable      Impression:  1. No acute cardiopulmonary disease.   Electronically Signed By-Evin Black MD On:5/22/2024 5:53 PM         Differential Diagnosis and Discussion:    Weakness: Based on the patient's history, signs, and symptoms, the diffential diagnosis includes but is not limited to meningitis, stroke, sepsis, subarachnoid hemorrhage, intracranial bleeding, encephalitis, acute uti, dehydration, MS, myasthenia gravis, Guillan [place], migraine variant, neuromuscular disorders vertigo, electrolyte imbalance, and metabolic disorders.    All labs were reviewed and interpreted by me.  EKG was interpreted by me.    MDM  Number of Diagnoses or Management Options  Acute febrile illness  Febrile illness  Hyperglycemia  Hypotension, unspecified hypotension type  Diagnosis management comments: The patient received IV fluids and IV antibiotics in the emergency department.  His blood pressure temporarily improved however went back down into the 90 systolic range.       Amount and/or Complexity of Data Reviewed  Clinical lab tests: reviewed  Tests in the radiology section of CPT®: reviewed  Tests in the medicine section of CPT®: reviewed                 Patient Care Considerations:    CT CHEST: I considered  ordering a CT scan of the chest, however the patient has no pulmonary symptoms.      Consultants/Shared Management Plan:    Hospitalist: I have discussed the case with hospitalist who agrees to accept the patient for admission.    Social Determinants of Health:    Patient is unable to carry out activities of daily life. Escalation of care is necessary.       Disposition and Care Coordination:    Admit:   Through independent evaluation of the patient's history, physical, and imperical data, the patient meets criteria for inpatient admission to the hospital.        Final diagnoses:   Hyperglycemia   Acute febrile illness   Hypotension, unspecified hypotension type   Febrile illness        ED Disposition       ED Disposition   Decision to Admit    Condition   --    Comment   Level of Care: Telemetry [5]   Diagnosis: Hypotension [201576]   Admitting Physician: JEYSON CORTEZ [[id number]]   Certification: I Certify That Inpatient Hospital Services Are Medically Necessary For Greater Than 2 Midnights                 This medical record created using voice recognition software.             Jf Weber,   05/22/24 3439

## 2024-05-22 NOTE — TELEPHONE ENCOUNTER
Caller: ENA CAROLINA    Relationship: Other    Best call back number: [phone]    What is the best time to reach you: ANY    Who are you requesting to speak with (clinical staff, provider,  specific staff member): ANY    Do you know the name of the person who called:     What was the call regarding:PATIENT WAS PRESCRIBED SOTALOL YESTERDAY BUT HE HAS ALREADY BEEN TAKING THIS AND CARVEDILOL.  IT IS NOT HELPING HIS BP TO COME UP.  THE PHARMACIST TOLD THEM IT DOES VERY LITTLE FOR BP BUT HELPS HIS HEART NOT WORK SO HARD.  THEY WOULD LIKE ADVISE ON HOW TO GET HIS BP UP.    Is it okay if the provider responds through MyChart:

## 2024-05-22 NOTE — TELEPHONE ENCOUNTER
I just discontinued his diltiazem yesterday.  Sotalol is for treatment of his A-fib, nothing to do with blood pressure.  Have him take half a tablet of carvedilol twice a day (6.25 mg dose each) and also half spironolactone dose to 12.5 mg daily.  Hold carvedilol and spironolactone if systolic blood pressure is less than 100 before taking meds.    Continue with these changes and follow-up as scheduled.

## 2024-05-23 PROBLEM — R53.1 GENERALIZED WEAKNESS: Status: ACTIVE | Noted: 2024-05-23

## 2024-05-23 PROBLEM — R29.6 FREQUENT FALLS: Status: ACTIVE | Noted: 2024-05-23

## 2024-05-23 PROBLEM — R73.9 HYPERGLYCEMIA: Status: ACTIVE | Noted: 2024-05-23

## 2024-05-23 PROBLEM — E11.65 UNCONTROLLED DIABETES MELLITUS WITH HYPERGLYCEMIA: Status: ACTIVE | Noted: 2024-05-23

## 2024-05-23 PROBLEM — R50.9 ACUTE FEBRILE ILLNESS: Status: ACTIVE | Noted: 2024-05-23

## 2024-05-23 LAB
ANION GAP SERPL CALCULATED.3IONS-SCNC: 9.3 MMOL/L (ref 5–15)
B PARAPERT DNA SPEC QL NAA+PROBE: NOT DETECTED
B PERT DNA SPEC QL NAA+PROBE: NOT DETECTED
BASOPHILS # BLD AUTO: 0.02 10*3/MM3 (ref 0–0.2)
BASOPHILS NFR BLD AUTO: 0.3 % (ref 0–1.5)
BUN SERPL-MCNC: 18 MG/DL (ref 6–20)
BUN/CREAT SERPL: 13 (ref 7–25)
C PNEUM DNA NPH QL NAA+NON-PROBE: NOT DETECTED
CALCIUM SPEC-SCNC: 8 MG/DL (ref 8.6–10.5)
CHLORIDE SERPL-SCNC: 97 MMOL/L (ref 98–107)
CO2 SERPL-SCNC: 24.7 MMOL/L (ref 22–29)
CREAT SERPL-MCNC: 1.38 MG/DL (ref 0.76–1.27)
DEPRECATED RDW RBC AUTO: 47.7 FL (ref 37–54)
EGFRCR SERPLBLD CKD-EPI 2021: 59.3 ML/MIN/1.73
EOSINOPHIL # BLD AUTO: 0.38 10*3/MM3 (ref 0–0.4)
EOSINOPHIL NFR BLD AUTO: 6.2 % (ref 0.3–6.2)
ERYTHROCYTE [DISTWIDTH] IN BLOOD BY AUTOMATED COUNT: 15.8 % (ref 12.3–15.4)
FLUAV SUBTYP SPEC NAA+PROBE: NOT DETECTED
FLUBV RNA ISLT QL NAA+PROBE: NOT DETECTED
GLUCOSE BLDC GLUCOMTR-MCNC: 127 MG/DL (ref 70–99)
GLUCOSE BLDC GLUCOMTR-MCNC: 152 MG/DL (ref 70–99)
GLUCOSE BLDC GLUCOMTR-MCNC: 231 MG/DL (ref 70–99)
GLUCOSE BLDC GLUCOMTR-MCNC: 241 MG/DL (ref 70–99)
GLUCOSE BLDC GLUCOMTR-MCNC: 310 MG/DL (ref 70–99)
GLUCOSE SERPL-MCNC: 310 MG/DL (ref 65–99)
HADV DNA SPEC NAA+PROBE: NOT DETECTED
HBA1C MFR BLD: 14.6 % (ref 4.8–5.6)
HCOV 229E RNA SPEC QL NAA+PROBE: NOT DETECTED
HCOV HKU1 RNA SPEC QL NAA+PROBE: NOT DETECTED
HCOV NL63 RNA SPEC QL NAA+PROBE: NOT DETECTED
HCOV OC43 RNA SPEC QL NAA+PROBE: NOT DETECTED
HCT VFR BLD AUTO: 37.1 % (ref 37.5–51)
HGB BLD-MCNC: 12.3 G/DL (ref 13–17.7)
HMPV RNA NPH QL NAA+NON-PROBE: NOT DETECTED
HPIV1 RNA ISLT QL NAA+PROBE: NOT DETECTED
HPIV2 RNA SPEC QL NAA+PROBE: NOT DETECTED
HPIV3 RNA NPH QL NAA+PROBE: NOT DETECTED
HPIV4 P GENE NPH QL NAA+PROBE: NOT DETECTED
IMM GRANULOCYTES # BLD AUTO: 0.01 10*3/MM3 (ref 0–0.05)
IMM GRANULOCYTES NFR BLD AUTO: 0.2 % (ref 0–0.5)
L PNEUMO1 AG UR QL IA: NEGATIVE
LYMPHOCYTES # BLD AUTO: 1.06 10*3/MM3 (ref 0.7–3.1)
LYMPHOCYTES NFR BLD AUTO: 17.2 % (ref 19.6–45.3)
M PNEUMO IGG SER IA-ACNC: NOT DETECTED
MCH RBC QN AUTO: 27.5 PG (ref 26.6–33)
MCHC RBC AUTO-ENTMCNC: 33.2 G/DL (ref 31.5–35.7)
MCV RBC AUTO: 83 FL (ref 79–97)
MONOCYTES # BLD AUTO: 0.62 10*3/MM3 (ref 0.1–0.9)
MONOCYTES NFR BLD AUTO: 10.1 % (ref 5–12)
NEUTROPHILS NFR BLD AUTO: 4.06 10*3/MM3 (ref 1.7–7)
NEUTROPHILS NFR BLD AUTO: 66 % (ref 42.7–76)
NRBC BLD AUTO-RTO: 0 /100 WBC (ref 0–0.2)
PLATELET # BLD AUTO: 126 10*3/MM3 (ref 140–450)
PMV BLD AUTO: 11.8 FL (ref 6–12)
POTASSIUM SERPL-SCNC: 4.5 MMOL/L (ref 3.5–5.2)
PROCALCITONIN SERPL-MCNC: 0.18 NG/ML (ref 0–0.25)
RBC # BLD AUTO: 4.47 10*6/MM3 (ref 4.14–5.8)
RHINOVIRUS RNA SPEC NAA+PROBE: NOT DETECTED
RSV RNA NPH QL NAA+NON-PROBE: NOT DETECTED
S PNEUM AG SPEC QL LA: NEGATIVE
SARS-COV-2 RNA RESP QL NAA+PROBE: NOT DETECTED
SODIUM SERPL-SCNC: 131 MMOL/L (ref 136–145)
WBC NRBC COR # BLD AUTO: 6.15 10*3/MM3 (ref 3.4–10.8)

## 2024-05-23 PROCEDURE — 99223 1ST HOSP IP/OBS HIGH 75: CPT | Performed by: FAMILY MEDICINE

## 2024-05-23 PROCEDURE — 97161 PT EVAL LOW COMPLEX 20 MIN: CPT

## 2024-05-23 PROCEDURE — 80048 BASIC METABOLIC PNL TOTAL CA: CPT | Performed by: FAMILY MEDICINE

## 2024-05-23 PROCEDURE — 82948 REAGENT STRIP/BLOOD GLUCOSE: CPT | Performed by: INTERNAL MEDICINE

## 2024-05-23 PROCEDURE — 93005 ELECTROCARDIOGRAM TRACING: CPT | Performed by: FAMILY MEDICINE

## 2024-05-23 PROCEDURE — 85025 COMPLETE CBC W/AUTO DIFF WBC: CPT | Performed by: FAMILY MEDICINE

## 2024-05-23 PROCEDURE — 87449 NOS EACH ORGANISM AG IA: CPT | Performed by: INTERNAL MEDICINE

## 2024-05-23 PROCEDURE — 63710000001 INSULIN GLARGINE PER 5 UNITS: Performed by: FAMILY MEDICINE

## 2024-05-23 PROCEDURE — 63710000001 INSULIN LISPRO (HUMAN) PER 5 UNITS: Performed by: INTERNAL MEDICINE

## 2024-05-23 PROCEDURE — 0202U NFCT DS 22 TRGT SARS-COV-2: CPT | Performed by: INTERNAL MEDICINE

## 2024-05-23 PROCEDURE — 93010 ELECTROCARDIOGRAM REPORT: CPT | Performed by: INTERNAL MEDICINE

## 2024-05-23 PROCEDURE — 82948 REAGENT STRIP/BLOOD GLUCOSE: CPT | Performed by: EMERGENCY MEDICINE

## 2024-05-23 PROCEDURE — 25810000003 SODIUM CHLORIDE 0.9 % SOLUTION: Performed by: FAMILY MEDICINE

## 2024-05-23 PROCEDURE — 87205 SMEAR GRAM STAIN: CPT | Performed by: INTERNAL MEDICINE

## 2024-05-23 PROCEDURE — 84145 PROCALCITONIN (PCT): CPT | Performed by: INTERNAL MEDICINE

## 2024-05-23 PROCEDURE — 87070 CULTURE OTHR SPECIMN AEROBIC: CPT | Performed by: INTERNAL MEDICINE

## 2024-05-23 PROCEDURE — 82948 REAGENT STRIP/BLOOD GLUCOSE: CPT

## 2024-05-23 PROCEDURE — 93005 ELECTROCARDIOGRAM TRACING: CPT | Performed by: INTERNAL MEDICINE

## 2024-05-23 PROCEDURE — 63710000001 INSULIN GLARGINE PER 5 UNITS: Performed by: INTERNAL MEDICINE

## 2024-05-23 PROCEDURE — 97165 OT EVAL LOW COMPLEX 30 MIN: CPT

## 2024-05-23 RX ORDER — NICOTINE POLACRILEX 4 MG
15 LOZENGE BUCCAL
Status: DISCONTINUED | OUTPATIENT
Start: 2024-05-23 | End: 2024-05-23 | Stop reason: SDUPTHER

## 2024-05-23 RX ORDER — SUB-Q INSULIN DEVICE, 40 UNIT
EACH MISCELLANEOUS
COMMUNITY

## 2024-05-23 RX ORDER — POLYETHYLENE GLYCOL 3350 17 G/17G
17 POWDER, FOR SOLUTION ORAL DAILY PRN
Status: DISCONTINUED | OUTPATIENT
Start: 2024-05-23 | End: 2024-05-25 | Stop reason: HOSPADM

## 2024-05-23 RX ORDER — BISACODYL 5 MG/1
5 TABLET, DELAYED RELEASE ORAL DAILY PRN
Status: DISCONTINUED | OUTPATIENT
Start: 2024-05-23 | End: 2024-05-25 | Stop reason: HOSPADM

## 2024-05-23 RX ORDER — LEVOTHYROXINE SODIUM 0.05 MG/1
50 TABLET ORAL EVERY MORNING
Status: DISCONTINUED | OUTPATIENT
Start: 2024-05-23 | End: 2024-05-25 | Stop reason: HOSPADM

## 2024-05-23 RX ORDER — ROSUVASTATIN CALCIUM 20 MG/1
20 TABLET, COATED ORAL NIGHTLY
Status: DISCONTINUED | OUTPATIENT
Start: 2024-05-23 | End: 2024-05-25 | Stop reason: HOSPADM

## 2024-05-23 RX ORDER — SODIUM CHLORIDE 0.9 % (FLUSH) 0.9 %
10 SYRINGE (ML) INJECTION EVERY 12 HOURS SCHEDULED
Status: DISCONTINUED | OUTPATIENT
Start: 2024-05-23 | End: 2024-05-25 | Stop reason: HOSPADM

## 2024-05-23 RX ORDER — INSULIN LISPRO 100 [IU]/ML
3-14 INJECTION, SOLUTION INTRAVENOUS; SUBCUTANEOUS
Status: DISCONTINUED | OUTPATIENT
Start: 2024-05-23 | End: 2024-05-23

## 2024-05-23 RX ORDER — ESCITALOPRAM OXALATE 10 MG/1
20 TABLET ORAL DAILY
Status: DISCONTINUED | OUTPATIENT
Start: 2024-05-23 | End: 2024-05-25 | Stop reason: HOSPADM

## 2024-05-23 RX ORDER — INSULIN LISPRO 100 [IU]/ML
4-24 INJECTION, SOLUTION INTRAVENOUS; SUBCUTANEOUS
Status: DISCONTINUED | OUTPATIENT
Start: 2024-05-23 | End: 2024-05-24

## 2024-05-23 RX ORDER — ONDANSETRON 2 MG/ML
4 INJECTION INTRAMUSCULAR; INTRAVENOUS EVERY 6 HOURS PRN
Status: DISCONTINUED | OUTPATIENT
Start: 2024-05-23 | End: 2024-05-25 | Stop reason: HOSPADM

## 2024-05-23 RX ORDER — NICOTINE POLACRILEX 4 MG
15 LOZENGE BUCCAL
Status: DISCONTINUED | OUTPATIENT
Start: 2024-05-23 | End: 2024-05-24

## 2024-05-23 RX ORDER — INSULIN ASPART INJECTION 100 [IU]/ML
INJECTION, SOLUTION SUBCUTANEOUS CONTINUOUS
COMMUNITY
End: 2024-05-29 | Stop reason: HOSPADM

## 2024-05-23 RX ORDER — BISACODYL 10 MG
10 SUPPOSITORY, RECTAL RECTAL DAILY PRN
Status: DISCONTINUED | OUTPATIENT
Start: 2024-05-23 | End: 2024-05-25 | Stop reason: HOSPADM

## 2024-05-23 RX ORDER — SOTALOL HYDROCHLORIDE 80 MG/1
80 TABLET ORAL EVERY 12 HOURS SCHEDULED
Status: DISCONTINUED | OUTPATIENT
Start: 2024-05-23 | End: 2024-05-25 | Stop reason: HOSPADM

## 2024-05-23 RX ORDER — SODIUM CHLORIDE 9 MG/ML
100 INJECTION, SOLUTION INTRAVENOUS CONTINUOUS
Status: ACTIVE | OUTPATIENT
Start: 2024-05-23 | End: 2024-05-23

## 2024-05-23 RX ORDER — SODIUM CHLORIDE 0.9 % (FLUSH) 0.9 %
10 SYRINGE (ML) INJECTION AS NEEDED
Status: DISCONTINUED | OUTPATIENT
Start: 2024-05-23 | End: 2024-05-25 | Stop reason: HOSPADM

## 2024-05-23 RX ORDER — IBUPROFEN 600 MG/1
1 TABLET ORAL
Status: DISCONTINUED | OUTPATIENT
Start: 2024-05-23 | End: 2024-05-23 | Stop reason: SDUPTHER

## 2024-05-23 RX ORDER — ASPIRIN 81 MG/1
81 TABLET ORAL DAILY
Status: DISCONTINUED | OUTPATIENT
Start: 2024-05-23 | End: 2024-05-25 | Stop reason: HOSPADM

## 2024-05-23 RX ORDER — IBUPROFEN 600 MG/1
1 TABLET ORAL
Status: DISCONTINUED | OUTPATIENT
Start: 2024-05-23 | End: 2024-05-24

## 2024-05-23 RX ORDER — SODIUM CHLORIDE 9 MG/ML
40 INJECTION, SOLUTION INTRAVENOUS AS NEEDED
Status: DISCONTINUED | OUTPATIENT
Start: 2024-05-23 | End: 2024-05-25 | Stop reason: HOSPADM

## 2024-05-23 RX ORDER — DEXTROSE MONOHYDRATE 25 G/50ML
25 INJECTION, SOLUTION INTRAVENOUS
Status: DISCONTINUED | OUTPATIENT
Start: 2024-05-23 | End: 2024-05-24

## 2024-05-23 RX ORDER — AMOXICILLIN 250 MG
2 CAPSULE ORAL 2 TIMES DAILY PRN
Status: DISCONTINUED | OUTPATIENT
Start: 2024-05-23 | End: 2024-05-25 | Stop reason: HOSPADM

## 2024-05-23 RX ADMIN — RIVAROXABAN 20 MG: 20 TABLET, FILM COATED ORAL at 09:17

## 2024-05-23 RX ADMIN — INSULIN LISPRO 16 UNITS: 100 INJECTION, SOLUTION INTRAVENOUS; SUBCUTANEOUS at 09:16

## 2024-05-23 RX ADMIN — INSULIN GLARGINE 30 UNITS: 100 INJECTION, SOLUTION SUBCUTANEOUS at 21:03

## 2024-05-23 RX ADMIN — INSULIN GLARGINE 20 UNITS: 100 INJECTION, SOLUTION SUBCUTANEOUS at 02:03

## 2024-05-23 RX ADMIN — SODIUM CHLORIDE 100 ML/HR: 9 INJECTION, SOLUTION INTRAVENOUS at 16:26

## 2024-05-23 RX ADMIN — ROSUVASTATIN 20 MG: 20 TABLET, FILM COATED ORAL at 21:03

## 2024-05-23 RX ADMIN — INSULIN LISPRO 4 UNITS: 100 INJECTION, SOLUTION INTRAVENOUS; SUBCUTANEOUS at 11:46

## 2024-05-23 RX ADMIN — ASPIRIN 81 MG: 81 TABLET, COATED ORAL at 09:17

## 2024-05-23 RX ADMIN — INSULIN LISPRO 8 UNITS: 100 INJECTION, SOLUTION INTRAVENOUS; SUBCUTANEOUS at 21:03

## 2024-05-23 RX ADMIN — SOTALOL HYDROCHLORIDE 80 MG: 80 TABLET ORAL at 21:03

## 2024-05-23 RX ADMIN — INSULIN GLARGINE 40 UNITS: 100 INJECTION, SOLUTION SUBCUTANEOUS at 09:17

## 2024-05-23 RX ADMIN — SODIUM CHLORIDE 100 ML/HR: 9 INJECTION, SOLUTION INTRAVENOUS at 02:03

## 2024-05-23 RX ADMIN — ESCITALOPRAM OXALATE 20 MG: 10 TABLET ORAL at 09:17

## 2024-05-23 NOTE — PLAN OF CARE
Goal Outcome Evaluation:  Plan of Care Reviewed With: patient, mother, father        Progress: no change (First session for evaluation)  Outcome Evaluation: Patient presents with limitations of balance, endurance/activity tolerance and cognition/safety awareness which impede his ability to perform ADL and transfers as prior.  The skills of a therapist will be required to safely and effectively implement treatment plan to restore maximal level of function.      Anticipated Discharge Disposition (OT): inpatient rehabilitation facility (Recent falls at home)

## 2024-05-23 NOTE — THERAPY EVALUATION
Acute Care - Physical Therapy Initial Evaluation   [place]     Patient Name: Fantasma Mcintosh  : 1965  MRN: [id number]  Today's Date: 2024      Visit Dx:     ICD-10-CM ICD-9-CM   1. Hyperglycemia  R73.9 790.29   2. Acute febrile illness  R50.9 780.60   3. Hypotension, unspecified hypotension type  I95.9 458.9   4. Febrile illness  R50.9 780.60   5. Decreased activities of daily living (ADL)  Z78.9 V49.89   6. Difficulty walking  R26.2 719.7     Patient Active Problem List   Diagnosis    Atrial fibrillation with RVR    Influenza A    Hypotension    Acute febrile illness    Hyperglycemia    Uncontrolled diabetes mellitus with hyperglycemia    Generalized weakness    Frequent falls     Past Medical History:   Diagnosis Date    Diabetes mellitus     Disease of thyroid gland      History reviewed. No pertinent surgical history.  PT Assessment (Last 12 Hours)       PT Evaluation and Treatment       Row Name 24 1258          Physical Therapy Time and Intention    Subjective Information no complaints  -DP     Document Type evaluation  -DP     Mode of Treatment individual therapy;physical therapy  -DP     Patient Effort good  -DP       Row Name 24 1258          General Information    Patient Profile Reviewed yes  -DP     Patient Observations alert;cooperative;agree to therapy  -DP     Prior Level of Function independent:;gait;transfer;bed mobility;ADL's  -DP     Equipment Currently Used at Home none  -DP     Existing Precautions/Restrictions no known precautions/restrictions  -DP     Barriers to Rehab none identified  -DP       Row Name 24 1258          Living Environment    Current Living Arrangements home  -DP     People in Home significant other  -DP     Primary Care Provided by self  -DP       Row Name 24 1258          Cognition    Orientation Status (Cognition) oriented x 3  -DP       Row Name 24 1258          Range of Motion Comprehensive    General Range of Motion bilateral lower  extremity ROM WFL  -DP       Row Name 05/23/24 1258          Strength (Manual Muscle Testing)    Strength (Manual Muscle Testing) bilateral lower extremities;strength is WFL  -DP       Row Name 05/23/24 1258          Bed Mobility    Bed Mobility supine-sit-supine  -DP     Supine-Sit-Supine [place] (Bed Mobility) standby assist  -DP       Row Name 05/23/24 1258          Transfers    Transfers sit-stand transfer  -DP       Row Name 05/23/24 1258          Sit-Stand Transfer    Sit-Stand [place] (Transfers) standby assist  -DP       Row Name 05/23/24 1258          Gait/Stairs (Locomotion)    Gait/Stairs Locomotion gait/ambulation independence  -DP     [place] Level (Gait) standby assist  -DP     Assistive Device (Gait) walker, front-wheeled  -DP     Patient was able to Ambulate yes  -DP     Distance in Feet (Gait) 200  -DP       Row Name 05/23/24 1258          Balance    Balance Assessment standing dynamic balance  -DP     Dynamic Standing Balance supervision  -DP       Row Name             Wound 05/23/24 0130 lower sternal    Wound - Properties Group Placement Date: 05/23/24  -KW Placement Time: 0130  -KW Present on Original Admission: Y  -KW Orientation: lower  -KW Location: sternal  -KW    Retired Wound - Properties Group Placement Date: 05/23/24  -KW Placement Time: 0130  -KW Present on Original Admission: Y  -KW Orientation: lower  -KW Location: sternal  -KW    Retired Wound - Properties Group Date first assessed: 05/23/24  -KW Time first assessed: 0130  -KW Present on Original Admission: Y  -KW Location: sternal  -KW      Row Name             Wound 05/23/24 0130 Left cervical spine    Wound - Properties Group Placement Date: 05/23/24  -KW Placement Time: 0130  -KW Present on Original Admission: Y  -KW Side: Left  -KW Location: cervical spine  -KW    Retired Wound - Properties Group Placement Date: 05/23/24  -KW Placement Time: 0130  -KW Present on Original Admission: Y  -KW Side: Left  -KW Location:  cervical spine  -KW    Retired Wound - Properties Group Date first assessed: 05/23/24  -KW Time first assessed: 0130  -KW Present on Original Admission: Y  -KW Side: Left  -KW Location: cervical spine  -KW      Row Name 05/23/24 1258          Plan of Care Review    Plan of Care Reviewed With patient  -DP     Outcome Evaluation Patient is able to complete all transfers and bed mobilities with standby assist in the room. Pt is able to ambulate ad charan in the room. Pt does not need inpatient PT services. Recommend DC home and use of a rolling walker upon return home.  -DP       Row Name 05/23/24 1258          Therapy Assessment/Plan (PT)    Criteria for Skilled Interventions Met (PT) no problems identified which require skilled intervention  -DP     Therapy Frequency (PT) evaluation only  -DP       Row Name 05/23/24 1258          PT Evaluation Complexity    History, PT Evaluation Complexity no personal factors and/or comorbidities  -DP     Examination of Body Systems (PT Eval Complexity) total of 4 or more elements  -DP     Clinical Presentation (PT Evaluation Complexity) stable  -DP     Clinical Decision Making (PT Evaluation Complexity) low complexity  -DP     Overall Complexity (PT Evaluation Complexity) low complexity  -DP       Row Name 05/23/24 1258          Physical Therapy Goals    Problem Specific Goal Selection (PT) problem specific goal 1, PT  -DP       Row Name 05/23/24 1252          Problem Specific Goal 1 (PT)    Problem Specific Goal 1 (PT) Complete PT evaluation.  -DP     Time Frame (Problem Specific Goal 1, PT) 1 day  -DP     Progress/Outcome (Problem Specific Goal 1, PT) goal met  -DP               User Key  (r) = Recorded By, (t) = Taken By, (c) = Cosigned By      Initials Name Provider Type    KW Daphnie Garcia RN Registered Nurse    Oliverio Nunes, PT Physical Therapist                      PT Recommendation and Plan  Anticipated Discharge Disposition (PT): home with home health  Therapy  Frequency (PT): evaluation only  Plan of Care Reviewed With: patient  Outcome Evaluation: Patient is able to complete all transfers and bed mobilities with standby assist in the room. Pt is able to ambulate ad charan in the room. Pt does not need inpatient PT services. Recommend DC home and use of a rolling walker upon return home.   Outcome Measures       Row Name 05/23/24 1300             How much help from another person do you currently need...    Turning from your back to your side while in flat bed without using bedrails? 4  -DP      Moving from lying on back to sitting on the side of a flat bed without bedrails? 4  -DP      Moving to and from a bed to a chair (including a wheelchair)? 4  -DP      Standing up from a chair using your arms (e.g., wheelchair, bedside chair)? 4  -DP      Climbing 3-5 steps with a railing? 3  -DP      To walk in hospital room? 4  -DP      AM-PAC 6 Clicks Score (PT) 23  -DP      Highest Level of Mobility Goal 7 --> Walk 25 feet or more  -DP         Functional Assessment    Outcome Measure Options AM-PAC 6 Clicks Basic Mobility (PT)  -DP                User Key  (r) = Recorded By, (t) = Taken By, (c) = Cosigned By      Initials Name Provider Type    Oliverio Nunes, DARY Physical Therapist                     Time Calculation:    PT Charges       Row Name 05/23/24 1303             Time Calculation    PT Received On 05/23/24  -DP         Untimed Charges    PT Eval/Re-eval Minutes 40  -DP         Total Minutes    Untimed Charges Total Minutes 40  -DP       Total Minutes 40  -DP                User Key  (r) = Recorded By, (t) = Taken By, (c) = Cosigned By      Initials Name Provider Type    Oliverio Nunes PT Physical Therapist                      PT G-Codes  Outcome Measure Options: AM-PAC 6 Clicks Basic Mobility (PT)  AM-PAC 6 Clicks Score (PT): 23  AM-PAC 6 Clicks Score (OT): 21    Oliverio Baez, PT  5/23/2024

## 2024-05-23 NOTE — THERAPY EVALUATION
Patient Name: Fantasma Mcintosh  : 1965    MRN: [id number]                              Today's Date: 2024       Admit Date: 2024    Visit Dx:     ICD-10-CM ICD-9-CM   1. Hyperglycemia  R73.9 790.29   2. Acute febrile illness  R50.9 780.60   3. Hypotension, unspecified hypotension type  I95.9 458.9   4. Febrile illness  R50.9 780.60   5. Decreased activities of daily living (ADL)  Z78.9 V49.89     Patient Active Problem List   Diagnosis    Atrial fibrillation with RVR    Influenza A    Hypotension    Acute febrile illness    Hyperglycemia    Uncontrolled diabetes mellitus with hyperglycemia    Generalized weakness    Frequent falls     Past Medical History:   Diagnosis Date    Diabetes mellitus     Disease of thyroid gland      History reviewed. No pertinent surgical history.   General Information       Row Name 24 1021          OT Time and Intention    Document Type evaluation  -AV     Mode of Treatment individual therapy;occupational therapy  -AV       Row Name 24 1021          General Information    Patient Profile Reviewed yes  -AV     Prior Level of Function independent:;ADL's;transfer;all household mobility  Stands to shower (tub).  Stands at sink to groom.  Standard commode.  Ambulates independently with occasional use of walker.  Supplemental oxygen at night-patient/father uncertain of amount.  -AV     Existing Precautions/Restrictions fall  Impaired skin integrity  -AV     Barriers to Rehab none identified  -AV       Row Name 24 1021          Occupational Profile    Reason for Services/Referral (Occupational Profile) Patient is a 59 year old male admitted to [facility] on 2024 with weakness and frequent falls at home.  He is currently on 3 W/room air.   OT consulted due to recent decline in ADL/transfer independence.  No previous OT services for current condition.  -AV       Row Name 24 1021          Living Environment    People in Home parent(s)  Mom and  dad  -AV       Row Name 05/23/24 1021          Home Main Entrance    Number of Stairs, Main Entrance three  -AV       Row Name 05/23/24 1021          Cognition    Orientation Status (Cognition) --  Alert, pleasant and cooperative.  Able to follow commands.  Decreased insight.  Questionable developmental delay per EMR.  -AV       Row Name 05/23/24 1021          Safety Issues, Functional Mobility    Impairments Affecting Function (Mobility) balance;cognition;endurance/activity tolerance  -AV               User Key  (r) = Recorded By, (t) = Taken By, (c) = Cosigned By      Initials Name Provider Type    Eliseo Hughes OT Occupational Therapist                     Mobility/ADL's       Row Name 05/23/24 1024          Bed Mobility    Bed Mobility bed mobility (all) activities  -AV     All Activities, [place] (Bed Mobility) independent  -AV       Row Name 05/23/24 1024          Transfers    Comment, (Transfers) Noted IV out of LUE upon arrival (nurse notified). transfer to long sitting independently to remove to tubing and bloody linens.  Further transfers deferred  -AV       Row Name 05/23/24 1024          Activities of Daily Living    BADL Assessment/Intervention --  Independent feeding and grooming with set up while seated.  CGA/min assist bathing and dressing.  CGA toilet hygiene.  -AV               User Key  (r) = Recorded By, (t) = Taken By, (c) = Cosigned By      Initials Name Provider Type    Eliseo Hughes OT Occupational Therapist                   Obj/Interventions       Row Name 05/23/24 1026          Sensory Assessment (Somatosensory)    Sensory Assessment (Somatosensory) UE sensation intact  -AV       Row Name 05/23/24 1026          Vision Assessment/Intervention    Visual Impairment/Limitations WFL  Noted right eye deviation  -AV       Row Name 05/23/24 1026          Range of Motion Comprehensive    General Range of Motion bilateral upper extremity ROM WFL  -AV     Comment, General Range of  Motion AROM  -AV       Row Name 05/23/24 1026          Strength Comprehensive (MMT)    Comment, General Manual Muscle Testing (MMT) Assessment 4+/5 bilateral biceps, triceps and   -AV       Row Name 05/23/24 1026          Motor Skills    Motor Skills coordination;functional endurance  -AV     Coordination WFL  Right dominant  -AV     Functional Endurance Fair minus  -AV       Row Name 05/23/24 1026          Balance    Comment, Balance Unable to assess at this time.  Balance impaired as evidenced by frequent falls at home recently.  -AV               User Key  (r) = Recorded By, (t) = Taken By, (c) = Cosigned By      Initials Name Provider Type    AV Eliseo Venegas OT Occupational Therapist                   Goals/Plan       Row Name 05/23/24 1028          Transfer Goal 1 (OT)    Activity/Assistive Device (Transfer Goal 1, OT) transfers, all;walker, rolling  -AV     [place] Level/Cues Needed (Transfer Goal 1, OT) supervision required;verbal cues required  -AV     Time Frame (Transfer Goal 1, OT) long term goal (LTG);10 days  -AV       Row Name 05/23/24 1028          Bathing Goal 1 (OT)    Activity/Device (Bathing Goal 1, OT) bathing skills, all;tub bench  -AV     [place] Level/Cues Needed (Bathing Goal 1, OT) supervision required;set-up required;verbal cues required  -AV     Time Frame (Bathing Goal 1, OT) long term goal (LTG);10 days  -AV       Row Name 05/23/24 1028          Dressing Goal 1 (OT)    Activity/Device (Dressing Goal 1, OT) dressing skills, all  -AV     [place]/Cues Needed (Dressing Goal 1, OT) supervision required;set-up required;verbal cues required  -AV     Time Frame (Dressing Goal 1, OT) long term goal (LTG);10 days  -AV       Row Name 05/23/24 1028          Toileting Goal 1 (OT)    Activity/Device (Toileting Goal 1, OT) toileting skills, all  -AV     [place] Level/Cues Needed (Toileting Goal 1, OT) supervision required;set-up required;verbal cues required  -AV     Time  Frame (Toileting Goal 1, OT) long term goal (LTG);10 days  -AV       Row Name 05/23/24 1028          Grooming Goal 1 (OT)    Activity/Device (Grooming Goal 1, OT) grooming skills, all  -AV     [place] (Grooming Goal 1, OT) supervision required;set-up required;verbal cues required  Standing at sink  -AV     Time Frame (Grooming Goal 1, OT) long term goal (LTG);10 days  -AV       Row Name 05/23/24 1028          Problem Specific Goal 1 (OT)    Problem Specific Goal 1 (OT) Patient will demonstrate fair endurance/activity tolerance needed to support ADLs.  -AV     Time Frame (Problem Specific Goal 1, OT) long term goal (LTG);10 days  -AV       Row Name 05/23/24 1028          Therapy Assessment/Plan (OT)    Planned Therapy Interventions (OT) activity tolerance training;BADL retraining;functional balance retraining;occupation/activity based interventions;patient/caregiver education/training;ROM/therapeutic exercise;transfer/mobility retraining  -AV               User Key  (r) = Recorded By, (t) = Taken By, (c) = Cosigned By      Initials Name Provider Type    AV Eliseo Venegas, OT Occupational Therapist                   Clinical Impression       Row Name 05/23/24 1027          Pain Assessment    Additional Documentation Pain Scale: FACES Pre/Post-Treatment (Group)  -AV       Row Name 05/23/24 1027          Pain Scale: FACES Pre/Post-Treatment    Pain: FACES Scale, Pretreatment 0-->no hurt  -AV     Posttreatment Pain Rating 0-->no hurt  -AV       Row Name 05/23/24 1027          Plan of Care Review    Plan of Care Reviewed With patient;mother;father  -AV     Progress no change  First session for evaluation  -AV     Outcome Evaluation Patient presents with limitations of balance, endurance/activity tolerance and cognition/safety awareness which impede his ability to perform ADL and transfers as prior.  The skills of a therapist will be required to safely and effectively implement treatment plan to restore maximal level  of function.  -AV       Row Name 05/23/24 1027          Therapy Assessment/Plan (OT)    Patient/Family Therapy Goal Statement (OT) Regain independence  -AV     Rehab Potential (OT) good, to achieve stated therapy goals  -AV     Criteria for Skilled Therapeutic Interventions Met (OT) yes;meets criteria;skilled treatment is necessary  -AV     Therapy Frequency (OT) 5 times/wk  -AV       Row Name 05/23/24 1027          Therapy Plan Review/Discharge Plan (OT)    Equipment Needs Upon Discharge (OT) walker, rolling;tub bench;commode chair  -AV     Anticipated Discharge Disposition (OT) inpatient rehabilitation facility  Recent falls at home  -AV       Row Name 05/23/24 1027          Vital Signs    O2 Delivery Pre Treatment room air  -AV     O2 Delivery Intra Treatment room air  -AV     O2 Delivery Post Treatment room air  -AV       Row Name 05/23/24 1027          Positioning and Restraints    Pre-Treatment Position in bed  -AV     Post Treatment Position bed  -AV     In Bed call light within reach;encouraged to call for assist;with family/caregiver;with nsg  -AV               User Key  (r) = Recorded By, (t) = Taken By, (c) = Cosigned By      Initials Name Provider Type    AV Eliseo Venegas, DICK Occupational Therapist                   Outcome Measures       Row Name 05/23/24 1029          How much help from another is currently needed...    Putting on and taking off regular lower body clothing? 3  -AV     Bathing (including washing, rinsing, and drying) 3  -AV     Toileting (which includes using toilet bed pan or urinal) 3  -AV     Putting on and taking off regular upper body clothing 4  -AV     Taking care of personal grooming (such as brushing teeth) 4  -AV     Eating meals 4  -AV     AM-PAC 6 Clicks Score (OT) 21  -AV       Row Name 05/23/24 0710 05/23/24 0112       How much help from another person do you currently need...    Turning from your back to your side while in flat bed without using bedrails? 4  -GB 4  -KW     Moving from lying on back to sitting on the side of a flat bed without bedrails? 4  -GB 4  -KW    Moving to and from a bed to a chair (including a wheelchair)? 3  -GB 3  -KW    Standing up from a chair using your arms (e.g., wheelchair, bedside chair)? 3  -GB 3  -KW    Climbing 3-5 steps with a railing? 3  -GB 3  -KW    To walk in hospital room? 3  -GB 3  -KW    AM-PAC 6 Clicks Score (PT) 20  -GB 20  -KW    Highest Level of Mobility Goal 6 --> Walk 10 steps or more  -GB 6 --> Walk 10 steps or more  -KW      Row Name 05/23/24 1029          Functional Assessment    Outcome Measure Options AM-PAC 6 Clicks Daily Activity (OT);Optimal Instrument  -AV       Row Name 05/23/24 1029          Optimal Instrument    Optimal Instrument Optimal - 3  -AV     Bending/Stooping 1  -AV     Standing 5  -AV     Reaching 1  -AV     From the list, choose the 3 activities you would most like to be able to do without any difficulty Bending/stooping;Standing;Reaching  -AV     Total Score Optimal - 3 7  -AV               User Key  (r) = Recorded By, (t) = Taken By, (c) = Cosigned By      Initials Name Provider Type    Jaylin Curry, RN Registered Nurse    Daphnie Escamilla, RN Registered Nurse    Eliseo Hughes OT Occupational Therapist                    Occupational Therapy Education       Title: PT OT SLP Therapies (Done)       Topic: Occupational Therapy (Done)       Point: ADL training (Done)       Description:   Instruct learner(s) on proper safety adaptation and remediation techniques during self care or transfers.   Instruct in proper use of assistive devices.                  Learning Progress Summary             Patient Acceptance, E, VU by PADMA at 5/23/2024 1030   Mother Acceptance, E, VU by AV at 5/23/2024 1030   Father Acceptance, E, VU by PADMA at 5/23/2024 1030                         Point: Home exercise program (Done)       Description:   Instruct learner(s) on appropriate technique for monitoring, assisting and/or  progressing therapeutic exercises/activities.                  Learning Progress Summary             Patient Acceptance, E, VU by AV at 5/23/2024 1030   Mother Acceptance, E, VU by AV at 5/23/2024 1030   Father Acceptance, E, VU by AV at 5/23/2024 1030                         Point: Precautions (Done)       Description:   Instruct learner(s) on prescribed precautions during self-care and functional transfers.                  Learning Progress Summary             Patient Acceptance, E, VU by AV at 5/23/2024 1030   Mother Acceptance, E, VU by AV at 5/23/2024 1030   Father Acceptance, E, VU by AV at 5/23/2024 1030                         Point: Body mechanics (Done)       Description:   Instruct learner(s) on proper positioning and spine alignment during self-care, functional mobility activities and/or exercises.                  Learning Progress Summary             Patient Acceptance, E, VU by AV at 5/23/2024 1030   Mother Acceptance, E, VU by AV at 5/23/2024 1030   Father Acceptance, E, VU by AV at 5/23/2024 1030                                         User Key       Initials Effective Dates Name Provider Type Discipline     06/16/21 -  Eliseo Venegas OT Occupational Therapist OT                  OT Recommendation and Plan  Planned Therapy Interventions (OT): activity tolerance training, BADL retraining, functional balance retraining, occupation/activity based interventions, patient/caregiver education/training, ROM/therapeutic exercise, transfer/mobility retraining  Therapy Frequency (OT): 5 times/wk  Plan of Care Review  Plan of Care Reviewed With: patient, mother, father  Progress: no change (First session for evaluation)  Outcome Evaluation: Patient presents with limitations of balance, endurance/activity tolerance and cognition/safety awareness which impede his ability to perform ADL and transfers as prior.  The skills of a therapist will be required to safely and effectively implement treatment plan to  restore maximal level of function.     Time Calculation:   Evaluation Complexity (OT)  Review Occupational Profile/Medical/Therapy History Complexity: expanded/moderate complexity  Assessment, Occupational Performance/Identification of Deficit Complexity: 1-3 performance deficits  Clinical Decision Making Complexity (OT): problem focused assessment/low complexity  Overall Complexity of Evaluation (OT): low complexity     Time Calculation- OT       Row Name 05/23/24 1031             Time Calculation- OT    OT Received On 05/23/24  -AV      OT Goal Re-Cert Due Date 06/01/24  -AV         Untimed Charges    OT Eval/Re-eval Minutes 32  -AV         Total Minutes    Untimed Charges Total Minutes 32  -AV       Total Minutes 32  -AV                User Key  (r) = Recorded By, (t) = Taken By, (c) = Cosigned By      Initials Name Provider Type    AV Eliseo Venegas OT Occupational Therapist                  Therapy Charges for Today       Code Description Service Date Service Provider Modifiers Qty    [id number] HC OT EVAL LOW COMPLEXITY 3 5/23/2024 Eliseo Venegas OT GO 1                 Eliseo Venegas OT  5/23/2024

## 2024-05-23 NOTE — H&P
[facility]   HISTORY AND PHYSICAL    Patient Name: Fantasma Mcintosh  : 1965  MRN: [id number]  Primary Care Physician:  Anish Alanis MD  Date of admission: 2024    Subjective   Subjective     Chief Complaint: Generalized weakness, frequent falls    HPI:    Fantasma Mcintosh is a 59 y.o. male with past medical history of uncontrolled diabetes, hypothyroidism, paroxysmal A-fib status post cardioversion, CHF, COPD, and possible developmental delay scented to the ED for evaluation of generalized weakness, frequent falls, low blood pressures.  History was somewhat difficult to obtain but family at bedside state that for the last few weeks or so he has been having dizziness upon standing resulting in multiple falls along with low blood pressures.  He was recently seen at the cardiologist office where his medications were adjusted by stopping his Cardizem and resuming his Coreg.  Patient also been having intermittent fevers over the last few days along with polyuria, polydipsia, and generalized weakness.  Due to his worsening symptoms patient came to the ED for further evaluation.  The ED patient was febrile and hypotensive with remaining vitals being within normal limits.  Labs showed severe hyperglycemia and lactic acidosis with remaining being relatively unremarkable including a normal WBC and urinalysis.  Chest x-ray was negative for any acute findings as well as his CT head.  When asked he denied any headaches, focal weakness, chest pain, palpitation, shortness of breath, cough, abdominal pain, nausea, vomiting, diarrhea, constipation, dysuria, hematuria, hematochezia, melena, or anxiety.  Patient admitted for further evaluation.          Review of Systems   All systems were reviewed and negative except for: As per HPI    Personal History     Past Medical History:   Diagnosis Date    Diabetes mellitus     Disease of thyroid gland        History reviewed. No pertinent surgical history.    Family  History: family history is not on file. Otherwise pertinent FHx was reviewed and not pertinent to current issue.    Social History:  reports that he has been smoking cigarettes. He has a 15 pack-year smoking history. He has never used smokeless tobacco. He reports that he does not currently use alcohol. He reports that he does not currently use drugs.    Home Medications:  Insulin Aspart (w/Niacinamide), V-Go 40, albuterol sulfate HFA, aspirin, budesonide, carvedilol, cholecalciferol, empagliflozin, escitalopram, fish oil, furosemide, gabapentin, levothyroxine, loratadine, rivaroxaban, rosuvastatin, sotalol, spironolactone, and sulfamethoxazole-trimethoprim      Allergies:  Allergies   Allergen Reactions    Penicillins Itching       Objective   Objective     Vitals:   Temp:  [99 °F (37.2 °C)-102.9 °F (39.4 °C)] 99 °F (37.2 °C)  Heart Rate:  [75-81] 78  Resp:  [18] 18  BP: ()/(59-73) 105/59  Physical Exam    Constitutional: Awake, alert   Eyes: PERRLA, sclerae anicteric, no conjunctival injection   HENT: NCAT, mucous membranes moist   Neck: Supple, no thyromegaly, no lymphadenopathy, trachea midline   Respiratory: Clear to auscultation bilaterally, nonlabored respirations    Cardiovascular: RRR, no murmurs, rubs, or gallops, palpable pedal pulses bilaterally   Gastrointestinal: Positive bowel sounds, soft, nontender, nondistended   Musculoskeletal: No bilateral ankle edema, no clubbing or cyanosis to extremities   Psychiatric: Appropriate affect, cooperative   Neurologic: Oriented x 3, lower extremity weakness, Cranial Nerves grossly intact to confrontation, speech clear   Skin: No rashes     Result Review    Result Review:  I have personally reviewed the results from the time of this admission to 5/23/2024 03:04 EDT and agree with these findings:  [x]  Laboratory list / accordion  []  Microbiology  [x]  Radiology  [x]  EKG/Telemetry   []  Cardiology/Vascular   []  Pathology  []  Old records  []  Other:  Most  notable findings include: Lactic acidosis, hyperglycemia, CT head negative, chest x-ray negative, urinalysis negative      Assessment & Plan   Assessment / Plan     Brief Patient Summary:  Fantasma Mcintosh is a 59 y.o. male with past medical history of uncontrolled diabetes, hypothyroidism, paroxysmal A-fib on Xarelto, CHF, COPD, and possible developmental delay scented to the ED for evaluation of generalized weakness, frequent falls, low blood pressures.    Active Hospital Problems:  Active Hospital Problems    Diagnosis     **Generalized weakness     Acute febrile illness     Hyperglycemia     Uncontrolled diabetes mellitus with hyperglycemia     Frequent falls     Hypotension     Atrial fibrillation with RVR      Plan:     Generalized weakness  -Admit to telemetry  -Patient with frequent falls, hypotension, and dizziness upon standing  -Severely hyperglycemic on arrival.  Likely poor glycemic control at home  -Febrile on arrival as well.  No clear etiology.  Antibiotics given  -Lactic acidosis noted  -Likely dehydration from uncontrolled diabetes  -IVF  -Orthostatic vital signs  -Echo reviewed from last week  -EKG reviewed  -Recurrent falls  -Fall precautions  -PT OT  -Supportive care    Uncontrolled diabetes  -Patient with severe hypoglycemia on arrival  -Patient uses disposable insulin pump, discontinue for now  -A1c  -Levemir at bedtime  -Sliding scale  -Titrate as needed  -Outpatient endocrinology follow-up    A-fib  -Resume Xarelto    COPD  -DuoNebs PRN    CHF    GI ppx  DVT ppx    CODE STATUS:    Level Of Support Discussed With: Patient  Code Status (Patient has no pulse and is not breathing): CPR (Attempt to Resuscitate)  Medical Interventions (Patient has pulse or is breathing): Full Support    Admission Status:  I believe this patient meets inpatient status.      Electronically signed by Brandon Ramirez MD, 05/23/24, 3:04 AM EDT.

## 2024-05-23 NOTE — PLAN OF CARE
Goal Outcome Evaluation:      Admit from ED on shift. A&O. VSS. No complaints from patient. All needs met at this time. Plan of care on going.

## 2024-05-23 NOTE — PLAN OF CARE
Goal Outcome Evaluation:  Plan of Care Reviewed With: patient, parent        Progress: improving

## 2024-05-23 NOTE — PLAN OF CARE
Goal Outcome Evaluation:  Plan of Care Reviewed With: patient           Outcome Evaluation: Patient is able to complete all transfers and bed mobilities with standby assist in the room. Pt is able to ambulate ad charan in the room. Pt does not need inpatient PT services. Recommend DC home and use of a rolling walker upon return home.      Anticipated Discharge Disposition (PT): home with home health

## 2024-05-24 LAB
ANION GAP SERPL CALCULATED.3IONS-SCNC: 8 MMOL/L (ref 5–15)
BASOPHILS # BLD AUTO: 0.02 10*3/MM3 (ref 0–0.2)
BASOPHILS NFR BLD AUTO: 0.4 % (ref 0–1.5)
BUN SERPL-MCNC: 20 MG/DL (ref 6–20)
BUN/CREAT SERPL: 19.6 (ref 7–25)
CALCIUM SPEC-SCNC: 8.2 MG/DL (ref 8.6–10.5)
CHLORIDE SERPL-SCNC: 100 MMOL/L (ref 98–107)
CO2 SERPL-SCNC: 26 MMOL/L (ref 22–29)
CREAT SERPL-MCNC: 1.02 MG/DL (ref 0.76–1.27)
DEPRECATED RDW RBC AUTO: 47.8 FL (ref 37–54)
EGFRCR SERPLBLD CKD-EPI 2021: 85.2 ML/MIN/1.73
EOSINOPHIL # BLD AUTO: 0.45 10*3/MM3 (ref 0–0.4)
EOSINOPHIL NFR BLD AUTO: 8 % (ref 0.3–6.2)
ERYTHROCYTE [DISTWIDTH] IN BLOOD BY AUTOMATED COUNT: 15.7 % (ref 12.3–15.4)
GLUCOSE BLDC GLUCOMTR-MCNC: 168 MG/DL (ref 70–99)
GLUCOSE BLDC GLUCOMTR-MCNC: 205 MG/DL (ref 70–99)
GLUCOSE BLDC GLUCOMTR-MCNC: 229 MG/DL (ref 70–99)
GLUCOSE BLDC GLUCOMTR-MCNC: 52 MG/DL (ref 70–99)
GLUCOSE BLDC GLUCOMTR-MCNC: 72 MG/DL (ref 70–99)
GLUCOSE BLDC GLUCOMTR-MCNC: 94 MG/DL (ref 70–99)
GLUCOSE SERPL-MCNC: 59 MG/DL (ref 65–99)
HCT VFR BLD AUTO: 36.4 % (ref 37.5–51)
HGB BLD-MCNC: 12.1 G/DL (ref 13–17.7)
IMM GRANULOCYTES # BLD AUTO: 0.01 10*3/MM3 (ref 0–0.05)
IMM GRANULOCYTES NFR BLD AUTO: 0.2 % (ref 0–0.5)
LYMPHOCYTES # BLD AUTO: 1.61 10*3/MM3 (ref 0.7–3.1)
LYMPHOCYTES NFR BLD AUTO: 28.5 % (ref 19.6–45.3)
MCH RBC QN AUTO: 27.7 PG (ref 26.6–33)
MCHC RBC AUTO-ENTMCNC: 33.2 G/DL (ref 31.5–35.7)
MCV RBC AUTO: 83.3 FL (ref 79–97)
MONOCYTES # BLD AUTO: 0.73 10*3/MM3 (ref 0.1–0.9)
MONOCYTES NFR BLD AUTO: 12.9 % (ref 5–12)
NEUTROPHILS NFR BLD AUTO: 2.83 10*3/MM3 (ref 1.7–7)
NEUTROPHILS NFR BLD AUTO: 50 % (ref 42.7–76)
NRBC BLD AUTO-RTO: 0 /100 WBC (ref 0–0.2)
PLATELET # BLD AUTO: 117 10*3/MM3 (ref 140–450)
PMV BLD AUTO: 11.6 FL (ref 6–12)
POTASSIUM SERPL-SCNC: 4 MMOL/L (ref 3.5–5.2)
RBC # BLD AUTO: 4.37 10*6/MM3 (ref 4.14–5.8)
RBC MORPH BLD: NORMAL
SMALL PLATELETS BLD QL SMEAR: NORMAL
SODIUM SERPL-SCNC: 134 MMOL/L (ref 136–145)
WBC MORPH BLD: NORMAL
WBC NRBC COR # BLD AUTO: 5.65 10*3/MM3 (ref 3.4–10.8)

## 2024-05-24 PROCEDURE — 97110 THERAPEUTIC EXERCISES: CPT

## 2024-05-24 PROCEDURE — 25810000003 LACTATED RINGERS SOLUTION: Performed by: INTERNAL MEDICINE

## 2024-05-24 PROCEDURE — 82948 REAGENT STRIP/BLOOD GLUCOSE: CPT

## 2024-05-24 PROCEDURE — 25010000002 AZITHROMYCIN PER 500 MG: Performed by: INTERNAL MEDICINE

## 2024-05-24 PROCEDURE — 93005 ELECTROCARDIOGRAM TRACING: CPT | Performed by: INTERNAL MEDICINE

## 2024-05-24 PROCEDURE — 85025 COMPLETE CBC W/AUTO DIFF WBC: CPT | Performed by: FAMILY MEDICINE

## 2024-05-24 PROCEDURE — 25010000002 CEFTRIAXONE PER 250 MG: Performed by: INTERNAL MEDICINE

## 2024-05-24 PROCEDURE — 63710000001 INSULIN GLARGINE PER 5 UNITS: Performed by: INTERNAL MEDICINE

## 2024-05-24 PROCEDURE — 93010 ELECTROCARDIOGRAM REPORT: CPT | Performed by: INTERNAL MEDICINE

## 2024-05-24 PROCEDURE — 80048 BASIC METABOLIC PNL TOTAL CA: CPT | Performed by: FAMILY MEDICINE

## 2024-05-24 PROCEDURE — 63710000001 INSULIN LISPRO (HUMAN) PER 5 UNITS: Performed by: INTERNAL MEDICINE

## 2024-05-24 PROCEDURE — 25810000003 SODIUM CHLORIDE 0.9 % SOLUTION: Performed by: INTERNAL MEDICINE

## 2024-05-24 PROCEDURE — 99233 SBSQ HOSP IP/OBS HIGH 50: CPT | Performed by: INTERNAL MEDICINE

## 2024-05-24 PROCEDURE — 85007 BL SMEAR W/DIFF WBC COUNT: CPT | Performed by: FAMILY MEDICINE

## 2024-05-24 PROCEDURE — 82948 REAGENT STRIP/BLOOD GLUCOSE: CPT | Performed by: INTERNAL MEDICINE

## 2024-05-24 RX ORDER — INSULIN LISPRO 100 [IU]/ML
3-14 INJECTION, SOLUTION INTRAVENOUS; SUBCUTANEOUS
Status: DISCONTINUED | OUTPATIENT
Start: 2024-05-24 | End: 2024-05-25 | Stop reason: HOSPADM

## 2024-05-24 RX ORDER — NICOTINE POLACRILEX 4 MG
15 LOZENGE BUCCAL
Status: DISCONTINUED | OUTPATIENT
Start: 2024-05-24 | End: 2024-05-25 | Stop reason: HOSPADM

## 2024-05-24 RX ORDER — IBUPROFEN 600 MG/1
1 TABLET ORAL
Status: DISCONTINUED | OUTPATIENT
Start: 2024-05-24 | End: 2024-05-25 | Stop reason: HOSPADM

## 2024-05-24 RX ORDER — MIDODRINE HYDROCHLORIDE 5 MG/1
5 TABLET ORAL
Status: DISCONTINUED | OUTPATIENT
Start: 2024-05-24 | End: 2024-05-25 | Stop reason: HOSPADM

## 2024-05-24 RX ORDER — DEXTROSE MONOHYDRATE 25 G/50ML
25 INJECTION, SOLUTION INTRAVENOUS
Status: DISCONTINUED | OUTPATIENT
Start: 2024-05-24 | End: 2024-05-25 | Stop reason: HOSPADM

## 2024-05-24 RX ADMIN — RIVAROXABAN 20 MG: 20 TABLET, FILM COATED ORAL at 09:11

## 2024-05-24 RX ADMIN — LEVOTHYROXINE SODIUM 50 MCG: 50 TABLET ORAL at 06:27

## 2024-05-24 RX ADMIN — INSULIN GLARGINE 20 UNITS: 100 INJECTION, SOLUTION SUBCUTANEOUS at 20:55

## 2024-05-24 RX ADMIN — SOTALOL HYDROCHLORIDE 80 MG: 80 TABLET ORAL at 09:12

## 2024-05-24 RX ADMIN — INSULIN LISPRO 5 UNITS: 100 INJECTION, SOLUTION INTRAVENOUS; SUBCUTANEOUS at 10:15

## 2024-05-24 RX ADMIN — INSULIN LISPRO 3 UNITS: 100 INJECTION, SOLUTION INTRAVENOUS; SUBCUTANEOUS at 12:04

## 2024-05-24 RX ADMIN — MIDODRINE HYDROCHLORIDE 5 MG: 5 TABLET ORAL at 16:38

## 2024-05-24 RX ADMIN — ESCITALOPRAM OXALATE 20 MG: 10 TABLET ORAL at 09:11

## 2024-05-24 RX ADMIN — SODIUM CHLORIDE, POTASSIUM CHLORIDE, SODIUM LACTATE AND CALCIUM CHLORIDE 500 ML: 600; 310; 30; 20 INJECTION, SOLUTION INTRAVENOUS at 10:15

## 2024-05-24 RX ADMIN — INSULIN GLARGINE 20 UNITS: 100 INJECTION, SOLUTION SUBCUTANEOUS at 10:15

## 2024-05-24 RX ADMIN — ASPIRIN 81 MG: 81 TABLET, COATED ORAL at 09:12

## 2024-05-24 RX ADMIN — ROSUVASTATIN 20 MG: 20 TABLET, FILM COATED ORAL at 20:56

## 2024-05-24 RX ADMIN — AZITHROMYCIN MONOHYDRATE 500 MG: 500 INJECTION, POWDER, LYOPHILIZED, FOR SOLUTION INTRAVENOUS at 11:01

## 2024-05-24 RX ADMIN — CEFTRIAXONE SODIUM 1000 MG: 1 INJECTION, POWDER, FOR SOLUTION INTRAMUSCULAR; INTRAVENOUS at 09:11

## 2024-05-24 RX ADMIN — INSULIN LISPRO 5 UNITS: 100 INJECTION, SOLUTION INTRAVENOUS; SUBCUTANEOUS at 20:55

## 2024-05-24 RX ADMIN — SOTALOL HYDROCHLORIDE 80 MG: 80 TABLET ORAL at 20:56

## 2024-05-24 RX ADMIN — Medication 10 ML: at 09:12

## 2024-05-24 NOTE — CONSULTS
Met with patient and family at bedside. Discussed current A1c of 14.6% with an estimated blood glucose of 372 mg/dl. Educated patient on long term effects of elevated blood sugars. Patient enjoys drinking regular soda at home, encouraged him to drink Diet or Zero sugar beverages instead. Patient seems uninterested in making changes at home to lower blood sugars. Patient has a follow up with his endocrinologist in June. Has no issues wearing V-Go insulin pump at home and states that he changes it daily, as prescribed. No questions or needs at this time.

## 2024-05-24 NOTE — PLAN OF CARE
Goal Outcome Evaluation:                      Patient is alert and oriented x4.  BP dropped with standing during orthostatic vital signs.  No complaints of pain.  IV antibiotics per MAR.  Fluid bolus per MAR.  Continuing plan of care.

## 2024-05-24 NOTE — PLAN OF CARE
Goal Outcome Evaluation:              Outcome Evaluation: Patient VSS this shift. A&Ox4. Administered scheduled meds and continuous IV fluids per MAR. Performed wound care to sternal wound. Replaced dislodged IV. Patient states no needs at this time. Plan of care to continue.

## 2024-05-24 NOTE — THERAPY TREATMENT NOTE
Patient Name: Fantasma Mcintosh  : 1965    MRN: [id number]                              Today's Date: 2024       Admit Date: 2024    Visit Dx:     ICD-10-CM ICD-9-CM   1. Hyperglycemia  R73.9 790.29   2. Acute febrile illness  R50.9 780.60   3. Hypotension, unspecified hypotension type  I95.9 458.9   4. Febrile illness  R50.9 780.60   5. Decreased activities of daily living (ADL)  Z78.9 V49.89   6. Difficulty walking  R26.2 719.7     Patient Active Problem List   Diagnosis    Atrial fibrillation with RVR    Influenza A    Hypotension    Acute febrile illness    Hyperglycemia    Uncontrolled diabetes mellitus with hyperglycemia    Generalized weakness    Frequent falls     Past Medical History:   Diagnosis Date    Diabetes mellitus     Disease of thyroid gland      History reviewed. No pertinent surgical history.   General Information       Row Name 24 1127          OT Time and Intention    Document Type therapy note (daily note)  -AV     Mode of Treatment individual therapy;occupational therapy  -AV       Row Name 24 1127          General Information    Existing Precautions/Restrictions no known precautions/restrictions  -AV       Row Name 24 1127          Cognition    Orientation Status (Cognition) --  Alert, pleasant cooperative.  Able to perform therapeutic exercises with minimal cues/demonstration.  -AV       Row Name 24 1127          Safety Issues, Functional Mobility    Impairments Affecting Function (Mobility) balance;cognition;endurance/activity tolerance  -AV               User Key  (r) = Recorded By, (t) = Taken By, (c) = Cosigned By      Initials Name Provider Type    AV Eliseo Venegas OT Occupational Therapist                     Mobility/ADL's    No documentation.                  Obj/Interventions       Row Name 24 1127          Shoulder (Therapeutic Exercise)    Shoulder (Therapeutic Exercise) strengthening exercise  -AV     Shoulder Strengthening  (Therapeutic Exercise) bilateral;flexion;horizontal aBduction/aDduction;resistance band;green;15 repititions  -AV       Row Name 05/24/24 1127          Elbow/Forearm (Therapeutic Exercise)    Elbow/Forearm (Therapeutic Exercise) strengthening exercise  -AV     Elbow/Forearm Strengthening (Therapeutic Exercise) bilateral;flexion;extension;resistance band;green;15 repititions  -AV       Row Name 05/24/24 1127          Motor Skills    Therapeutic Exercise shoulder;elbow/forearm  Performed in Semi-Estrada's/on room air  -AV               User Key  (r) = Recorded By, (t) = Taken By, (c) = Cosigned By      Initials Name Provider Type    Eliseo Hughes OT Occupational Therapist                   Goals/Plan    No documentation.                  Clinical Impression       Row Name 05/24/24 1128          Pain Scale: FACES Pre/Post-Treatment    Pain: FACES Scale, Pretreatment 0-->no hurt  -AV     Posttreatment Pain Rating 0-->no hurt  -AV       Row Name 05/24/24 1128          Plan of Care Review    Progress no change  -AV     Outcome Evaluation Patient performed upper extremity therapeutic exercises with moderate resistance Thera-Band to improve endurance/activity tolerance needed to support ADLs.  -AV       Row Name 05/24/24 1128          Vital Signs    O2 Delivery Pre Treatment room air  -AV     O2 Delivery Intra Treatment room air  -AV     O2 Delivery Post Treatment room air  -AV       Row Name 05/24/24 1128          Positioning and Restraints    Pre-Treatment Position in bed  -AV     Post Treatment Position bed  -AV     In Bed call light within reach;encouraged to call for assist;with family/caregiver  -AV               User Key  (r) = Recorded By, (t) = Taken By, (c) = Cosigned By      Initials Name Provider Type    Eliseo Hughes OT Occupational Therapist                   Outcome Measures       Row Name 05/24/24 1128          How much help from another is currently needed...    Putting on and taking off regular  lower body clothing? 3  -AV     Bathing (including washing, rinsing, and drying) 3  -AV     Toileting (which includes using toilet bed pan or urinal) 3  -AV     Putting on and taking off regular upper body clothing 4  -AV     Taking care of personal grooming (such as brushing teeth) 4  -AV     Eating meals 4  -AV     AM-PAC 6 Clicks Score (OT) 21  -AV       Row Name 05/24/24 0728          How much help from another person do you currently need...    Turning from your back to your side while in flat bed without using bedrails? 4  -MF     Moving from lying on back to sitting on the side of a flat bed without bedrails? 4  -MF     Moving to and from a bed to a chair (including a wheelchair)? 4  -MF     Standing up from a chair using your arms (e.g., wheelchair, bedside chair)? 4  -MF     Climbing 3-5 steps with a railing? 3  -MF     To walk in hospital room? 4  -MF     AM-PAC 6 Clicks Score (PT) 23  -MF     Highest Level of Mobility Goal 7 --> Walk 25 feet or more  -       Row Name 05/24/24 1128          Optimal Instrument    Bending/Stooping 1  -AV     Standing 2  -AV     Reaching 1  -AV               User Key  (r) = Recorded By, (t) = Taken By, (c) = Cosigned By      Initials Name Provider Type    Hannah Young, RN Registered Nurse    Eliseo Hughes OT Occupational Therapist                    Occupational Therapy Education       Title: PT OT SLP Therapies (Done)       Topic: Occupational Therapy (Done)       Point: ADL training (Done)       Description:   Instruct learner(s) on proper safety adaptation and remediation techniques during self care or transfers.   Instruct in proper use of assistive devices.                  Learning Progress Summary             Patient Acceptance, E, VU by PADMA at 5/23/2024 1030   Mother Acceptance, E, VU by AV at 5/23/2024 1030   Father Acceptance, E, VU by PADMA at 5/23/2024 1030                         Point: Home exercise program (Done)       Description:   Instruct learner(s)  on appropriate technique for monitoring, assisting and/or progressing therapeutic exercises/activities.                  Learning Progress Summary             Patient Acceptance, E, VU by AV at 5/23/2024 1030   Mother Acceptance, E, VU by AV at 5/23/2024 1030   Father Acceptance, E, VU by AV at 5/23/2024 1030                         Point: Precautions (Done)       Description:   Instruct learner(s) on prescribed precautions during self-care and functional transfers.                  Learning Progress Summary             Patient Acceptance, E, VU by AV at 5/23/2024 1030   Mother Acceptance, E, VU by AV at 5/23/2024 1030   Father Acceptance, E, VU by AV at 5/23/2024 1030                         Point: Body mechanics (Done)       Description:   Instruct learner(s) on proper positioning and spine alignment during self-care, functional mobility activities and/or exercises.                  Learning Progress Summary             Patient Acceptance, E, VU by AV at 5/23/2024 1030   Mother Acceptance, E, VU by AV at 5/23/2024 1030   Father Acceptance, E, VU by AV at 5/23/2024 1030                                         User Key       Initials Effective Dates Name Provider Type Discipline    AV 06/16/21 -  Eliseo Venegas OT Occupational Therapist OT                  OT Recommendation and Plan  Planned Therapy Interventions (OT): activity tolerance training, BADL retraining, functional balance retraining, occupation/activity based interventions, patient/caregiver education/training, ROM/therapeutic exercise, transfer/mobility retraining  Therapy Frequency (OT): 5 times/wk  Plan of Care Review  Plan of Care Reviewed With: patient, mother, father  Progress: no change  Outcome Evaluation: Patient performed upper extremity therapeutic exercises with moderate resistance Thera-Band to improve endurance/activity tolerance needed to support ADLs.     Time Calculation:   Evaluation Complexity (OT)  Review Occupational  Profile/Medical/Therapy History Complexity: expanded/moderate complexity  Assessment, Occupational Performance/Identification of Deficit Complexity: 1-3 performance deficits  Clinical Decision Making Complexity (OT): problem focused assessment/low complexity  Overall Complexity of Evaluation (OT): low complexity     Time Calculation- OT       Row Name 05/24/24 1129             Time Calculation- OT    OT Received On 05/24/24  -AV      OT Goal Re-Cert Due Date 06/01/24  -AV         Timed Charges    90821 - OT Therapeutic Exercise Minutes 10  -AV         Total Minutes    Timed Charges Total Minutes 10  -AV       Total Minutes 10  -AV                User Key  (r) = Recorded By, (t) = Taken By, (c) = Cosigned By      Initials Name Provider Type    AV Eliseo Venegas OT Occupational Therapist                  Therapy Charges for Today       Code Description Service Date Service Provider Modifiers Qty    73067772651  OT EVAL LOW COMPLEXITY 3 5/23/2024 Eliseo Venegas OT GO 1    [id number]  OT THER PROC EA 15 MIN 5/24/2024 Eliseo Venegas OT GO 1                 Eliseo Venegas OT  5/24/2024

## 2024-05-24 NOTE — SIGNIFICANT NOTE
Wound Eval / Progress Noted    TYLOR Dowell     Patient Name: Fantasma Mcintosh  : 1965  MRN: [id number]  Today's Date: 2024                 Admit Date: 2024    Visit Dx:    ICD-10-CM ICD-9-CM   1. Hyperglycemia  R73.9 790.29   2. Acute febrile illness  R50.9 780.60   3. Hypotension, unspecified hypotension type  I95.9 458.9   4. Febrile illness  R50.9 780.60   5. Decreased activities of daily living (ADL)  Z78.9 V49.89   6. Difficulty walking  R26.2 719.7         Generalized weakness    Atrial fibrillation with RVR    Hypotension    Acute febrile illness    Hyperglycemia    Uncontrolled diabetes mellitus with hyperglycemia    Frequent falls        Past Medical History:   Diagnosis Date    Diabetes mellitus     Disease of thyroid gland         History reviewed. No pertinent surgical history.      Physical Assessment:  Wound 24 0130 lower sternal (Active)   Wound Image   24 1055   Dressing Appearance intact;moist drainage 24 1055   Closure None 24 1055   Base moist;red;white 24 1055   Periwound redness;warm;indurated 24 1055   Periwound Temperature warm 24 1055   Periwound Skin Turgor soft 24 1055   Edges open 24 1055   Wound Length (cm) 1.1 cm 24 1055   Wound Width (cm) 0.2 cm 24 1055   Wound Depth (cm) 0.6 cm 24 1055   Wound Surface Area (cm^2) 0.22 cm^2 24 1055   Wound Volume (cm^3) 0.132 cm^3 24 1055   Drainage Characteristics/Odor purulent;yellow;serosanguineous;other (see comments) 24 1055   Drainage Amount moderate 24 1055   Care, Wound cleansed with;irrigated with;sterile normal saline 24 1055   Dressing Care dressing applied;hydrofiber;silver impregnated;silicone;border dressing 24 1055   Periwound Care absorptive dressing applied 24 1055       Wound 24 0130 Left cervical spine (Active)   Wound Image   24 1055   Dressing Appearance open to air 24 105   Closure None  05/24/24 1055   Base red;dry;scab 05/24/24 1055   Periwound swelling;indurated 05/24/24 1055   Periwound Temperature warm 05/24/24 1055   Periwound Skin Turgor firm 05/24/24 1055   Edges rolled/closed 05/24/24 1055   Wound Length (cm) 2.8 cm 05/24/24 1055   Wound Width (cm) 3.5 cm 05/24/24 1055   Wound Surface Area (cm^2) 9.8 cm^2 05/24/24 1055   Drainage Amount none 05/24/24 1055   Care, Wound cleansed with;sterile normal saline 05/24/24 1055   Dressing Care open to air 05/24/24 1055      Bilateral Gluteal aspects    Right Foot, Soft tissue necrosis 4th toe 0.4cm x 0.3cm     Left foot      Wound Check / Follow-up: Patient seen today for wound consult.  Patient is currently lying in bed awake and alert.  Family is at bedside.  Explained purpose of visit and patient is agreeable to assessment.  Patient does state that he is diabetic and his family states that his blood sugars were excessively high, in the 400s upon arrival.    Patient presents with previously excised area to medial chest.  He states that it was excised at his regular doctor's office.  But has failed to close and heal.  The wound opening reveals red moist tissue but also reveals thick yellow debris.  Induration is palpated around the opening.  With gentle palpation moderate amount of thick yellow debris and purulent drainage expressed.  Serosanguineous drainage noted upon completion.  Cleansed with normal saline and gauze recommending daily dressing change with silver impregnated Hydrofiber and silicone border dressing.    Patient also has an area of induration noted to his left cervical spine.  Patient states he has had this area excised in the past but needs to have it done again.  His plan is to return to a physician to have that completed.  There is no active drainage from the site there are crusted areas noted tissue is soft with fluctuance.  No dressing changes recommended at present time.    Patient does have scattered crusting noted to  bilateral feet.  See photos above.  He does have 1 area of tissue necrosis noted along the right fourth toe at the base of the toenail.  This area measures as stated above.  There is no active drainage or open wounds.  Recommending skin care hygiene and protection.    Bilateral gluteal aspects with blanchable discoloration.  Moisture within gluteal crease.  Skin protection recommended.    Impression: Previously excised wound to medial chest.  Induration to left cervical spine.  Scattered crusted areas to bilateral feet.  Soft tissue necrosis to right fourth toe.  Blanchable discoloration to bilateral gluteal aspects along with moisture.    Short term goals: Skin care/hygiene, skin protection, moisture prevention, pressure reduction.  Daily dressing changes.  Topical treatments    Leelee Hurst RN    5/24/2024    16:36 EDT

## 2024-05-24 NOTE — PROGRESS NOTES
[facility]   Hospitalist Progress Note  Date: 2024  Patient Name: Fantasma Mcintosh  : 1965  MRN: [id number]  Date of admission: 2024      Subjective   Subjective     Chief Complaint: Generalized weakness, falls    Summary:  59 y.o. male with past medical history of uncontrolled diabetes, hypothyroidism, paroxysmal A-fib status post cardioversion, CHF, COPD, and possible developmental delay scented to the ED for evaluation of generalized weakness, frequent falls, low blood pressures.  He was recently seen in his cardiologist office where medications were adjusted by stopping his Cardizem and reducing Coreg.  He has also been having fevers over the last few days.  He was admitted for further care, found to have orthostatic hypotension.  Was fluid resuscitated, started on midodrine with lower extremity compression stockings.    Interval Followup: No acute events overnight.  Still drops his blood pressure when he stands up.  Was hypoglycemic this morning in the 50s.  A1c notably elevated at 14.6.    Objective   Objective     Vitals:   Temp:  [97.4 °F (36.3 °C)-98.8 °F (37.1 °C)] 97.5 °F (36.4 °C)  Heart Rate:  [61-76] 66  Resp:  [16-24] 20  BP: ()/(54-73) 110/73  Physical Exam    Constitutional: Awake, alert, no acute distress, pleasant   Respiratory: Clear to auscultation bilaterally, nonlabored respirations    Cardiovascular: RRR, no MRG   Gastrointestinal: Positive bowel sounds, soft, nontender, nondistended   Neurologic: Oriented x 3, strength symmetric in all extremities, Cranial Nerves grossly intact to confrontation, speech clear    Result Review    I have personally reviewed the results below:  [x]  Laboratory personally reviewed BMP, CBC, blood sugars, procalcitonin  []  Microbiology  []  Radiology  []  EKG/Telemetry   []  Cardiology/Vascular   []  Pathology  []  Old records  []  Other:  CBC          2024    17:37 2024    05:45 2024    04:50   CBC   WBC 6.16  6.15  5.65     RBC 4.72  4.47  4.37    Hemoglobin 13.0  12.3  12.1    Hematocrit 39.1  37.1  36.4    MCV 82.8  83.0  83.3    MCH 27.5  27.5  27.7    MCHC 33.2  33.2  33.2    RDW 15.3  15.8  15.7    Platelets 124  126  117      CMP          5/22/2024    17:37 5/23/2024    05:45 5/24/2024    04:50   CMP   Glucose 538  310  59    BUN 20  18  20    Creatinine 1.74  1.38  1.02    EGFR 44.9  59.3  85.2    Sodium 129  131  134    Potassium 4.6  4.5  4.0    Chloride 90  97  100    Calcium 8.6  8.0  8.2    Total Protein 8.0      Albumin 4.0      Globulin 4.0      Total Bilirubin 0.4      Alkaline Phosphatase 120      AST (SGOT) 33      ALT (SGPT) 55      Albumin/Globulin Ratio 1.0      BUN/Creatinine Ratio 11.5  13.0  19.6    Anion Gap 11.8  9.3  8.0        Assessment & Plan   Assessment / Plan   Fever, questionable community-acquired pneumonia due to unknown bacterial source, presumed gram-negative  Orthostatic hypotension  Hypoglycemia  Poorly controlled type 2 diabetes mellitus  Atrial fibrillation on Xarelto  Chronic diastolic congestive heart failure, not in acute exacerbation  COPD  Possible developmental delay    Continue to monitor in the hospital for workup and management of the above  Patient still orthostatic this morning  Apply GUILLERMO hose bilaterally  Start midodrine 5 mg 3 times daily  Continue to hold Coreg and diltiazem  Bolus 500 cc of LR and monitor  Patient still had fever overnight, will start Rocephin azithromycin to complete a 5-day course of antibiotics for presumed pneumonia  Blood sugars improved this morning after dextrose, decrease Lantus to 20 units daily, continue sliding scale insulin with [facility]  Diabetic educator consulted, appreciate assistance  PT/OT consulted, PT recommending home with home health.  Patient is not interested in rehab at this time  Continue Xarelto  Continue scheduled breathing treatments  Trend renal function and electrolytes with a.m. BMP, magnesium   Trend Hgb and WBC with a.m.  CBC     Discussed plan with RN.    DVT prophylaxis:  Medical DVT prophylaxis orders are present.        CODE STATUS:   Level Of Support Discussed With: Patient  Code Status (Patient has no pulse and is not breathing): CPR (Attempt to Resuscitate)  Medical Interventions (Patient has pulse or is breathing): Full Support        Electronically signed by Erasmo Mcmahan MD, 05/24/24, 2:19 PM EDT.

## 2024-05-25 ENCOUNTER — READMISSION MANAGEMENT (OUTPATIENT)
Dept: CALL CENTER | Facility: HOSPITAL | Age: 59
End: 2024-05-25
Payer: MEDICARE

## 2024-05-25 VITALS
DIASTOLIC BLOOD PRESSURE: 65 MMHG | OXYGEN SATURATION: 98 % | WEIGHT: 199.74 LBS | SYSTOLIC BLOOD PRESSURE: 101 MMHG | RESPIRATION RATE: 18 BRPM | TEMPERATURE: 97.9 F | HEIGHT: 76 IN | BODY MASS INDEX: 24.32 KG/M2 | HEART RATE: 60 BPM

## 2024-05-25 LAB
ANION GAP SERPL CALCULATED.3IONS-SCNC: 9.2 MMOL/L (ref 5–15)
ANISOCYTOSIS BLD QL: ABNORMAL
BACTERIA SPEC RESP CULT: NORMAL
BUN SERPL-MCNC: 22 MG/DL (ref 6–20)
BUN/CREAT SERPL: 25.3 (ref 7–25)
BURR CELLS BLD QL SMEAR: ABNORMAL
CALCIUM SPEC-SCNC: 8.3 MG/DL (ref 8.6–10.5)
CHLORIDE SERPL-SCNC: 100 MMOL/L (ref 98–107)
CO2 SERPL-SCNC: 26.8 MMOL/L (ref 22–29)
CREAT SERPL-MCNC: 0.87 MG/DL (ref 0.76–1.27)
DACRYOCYTES BLD QL SMEAR: ABNORMAL
DEPRECATED RDW RBC AUTO: 47.6 FL (ref 37–54)
EGFRCR SERPLBLD CKD-EPI 2021: 100 ML/MIN/1.73
EOSINOPHIL # BLD MANUAL: 0.05 10*3/MM3 (ref 0–0.4)
EOSINOPHIL NFR BLD MANUAL: 1 % (ref 0.3–6.2)
ERYTHROCYTE [DISTWIDTH] IN BLOOD BY AUTOMATED COUNT: 15.8 % (ref 12.3–15.4)
GLUCOSE BLDC GLUCOMTR-MCNC: 152 MG/DL (ref 70–99)
GLUCOSE BLDC GLUCOMTR-MCNC: 69 MG/DL (ref 70–99)
GLUCOSE SERPL-MCNC: 54 MG/DL (ref 65–99)
GRAM STN SPEC: NORMAL
HCT VFR BLD AUTO: 35.4 % (ref 37.5–51)
HGB BLD-MCNC: 11.9 G/DL (ref 13–17.7)
LARGE PLATELETS: ABNORMAL
LYMPHOCYTES # BLD MANUAL: 2.45 10*3/MM3 (ref 0.7–3.1)
LYMPHOCYTES NFR BLD MANUAL: 4 % (ref 5–12)
MCH RBC QN AUTO: 27.9 PG (ref 26.6–33)
MCHC RBC AUTO-ENTMCNC: 33.6 G/DL (ref 31.5–35.7)
MCV RBC AUTO: 82.9 FL (ref 79–97)
METAMYELOCYTES NFR BLD MANUAL: 1 % (ref 0–0)
MONOCYTES # BLD: 0.21 10*3/MM3 (ref 0.1–0.9)
NEUTROPHILS # BLD AUTO: 2.45 10*3/MM3 (ref 1.7–7)
NEUTROPHILS NFR BLD MANUAL: 43 % (ref 42.7–76)
NEUTS BAND NFR BLD MANUAL: 3 % (ref 0–5)
OVALOCYTES BLD QL SMEAR: ABNORMAL
PLATELET # BLD AUTO: 131 10*3/MM3 (ref 140–450)
PMV BLD AUTO: 12.2 FL (ref 6–12)
POTASSIUM SERPL-SCNC: 4.2 MMOL/L (ref 3.5–5.2)
PROMYELOCYTES NFR BLD MANUAL: 2 % (ref 0–0)
QT INTERVAL: 424 MS
QT INTERVAL: 434 MS
QT INTERVAL: 446 MS
QTC INTERVAL: 437 MS
QTC INTERVAL: 446 MS
QTC INTERVAL: 448 MS
RBC # BLD AUTO: 4.27 10*6/MM3 (ref 4.14–5.8)
SMALL PLATELETS BLD QL SMEAR: ABNORMAL
SODIUM SERPL-SCNC: 136 MMOL/L (ref 136–145)
VARIANT LYMPHS NFR BLD MANUAL: 40 % (ref 19.6–45.3)
VARIANT LYMPHS NFR BLD MANUAL: 6 % (ref 0–5)
WBC MORPH BLD: NORMAL
WBC NRBC COR # BLD AUTO: 5.32 10*3/MM3 (ref 3.4–10.8)

## 2024-05-25 PROCEDURE — 82948 REAGENT STRIP/BLOOD GLUCOSE: CPT | Performed by: INTERNAL MEDICINE

## 2024-05-25 PROCEDURE — 85025 COMPLETE CBC W/AUTO DIFF WBC: CPT | Performed by: FAMILY MEDICINE

## 2024-05-25 PROCEDURE — 25010000002 AZITHROMYCIN PER 500 MG: Performed by: INTERNAL MEDICINE

## 2024-05-25 PROCEDURE — 25010000002 CEFTRIAXONE PER 250 MG: Performed by: INTERNAL MEDICINE

## 2024-05-25 PROCEDURE — 63710000001 INSULIN GLARGINE PER 5 UNITS: Performed by: INTERNAL MEDICINE

## 2024-05-25 PROCEDURE — 85007 BL SMEAR W/DIFF WBC COUNT: CPT | Performed by: FAMILY MEDICINE

## 2024-05-25 PROCEDURE — 99239 HOSP IP/OBS DSCHRG MGMT >30: CPT | Performed by: INTERNAL MEDICINE

## 2024-05-25 PROCEDURE — 80048 BASIC METABOLIC PNL TOTAL CA: CPT | Performed by: FAMILY MEDICINE

## 2024-05-25 PROCEDURE — 25810000003 SODIUM CHLORIDE 0.9 % SOLUTION: Performed by: INTERNAL MEDICINE

## 2024-05-25 PROCEDURE — 82948 REAGENT STRIP/BLOOD GLUCOSE: CPT

## 2024-05-25 RX ORDER — FLUDROCORTISONE ACETATE 0.1 MG/1
50 TABLET ORAL DAILY
Status: DISCONTINUED | OUTPATIENT
Start: 2024-05-25 | End: 2024-05-25 | Stop reason: HOSPADM

## 2024-05-25 RX ORDER — CEFDINIR 300 MG/1
300 CAPSULE ORAL 2 TIMES DAILY
Qty: 6 CAPSULE | Refills: 0 | Status: SHIPPED | OUTPATIENT
Start: 2024-05-25 | End: 2024-05-29 | Stop reason: HOSPADM

## 2024-05-25 RX ORDER — MIDODRINE HYDROCHLORIDE 5 MG/1
5 TABLET ORAL
Qty: 90 TABLET | Refills: 0 | Status: SHIPPED | OUTPATIENT
Start: 2024-05-25 | End: 2024-06-24

## 2024-05-25 RX ADMIN — SOTALOL HYDROCHLORIDE 80 MG: 80 TABLET ORAL at 08:43

## 2024-05-25 RX ADMIN — RIVAROXABAN 20 MG: 20 TABLET, FILM COATED ORAL at 08:43

## 2024-05-25 RX ADMIN — AZITHROMYCIN MONOHYDRATE 500 MG: 500 INJECTION, POWDER, LYOPHILIZED, FOR SOLUTION INTRAVENOUS at 08:44

## 2024-05-25 RX ADMIN — LEVOTHYROXINE SODIUM 50 MCG: 50 TABLET ORAL at 06:40

## 2024-05-25 RX ADMIN — ASPIRIN 81 MG: 81 TABLET, COATED ORAL at 08:43

## 2024-05-25 RX ADMIN — CEFTRIAXONE SODIUM 1000 MG: 1 INJECTION, POWDER, FOR SOLUTION INTRAMUSCULAR; INTRAVENOUS at 08:46

## 2024-05-25 RX ADMIN — ESCITALOPRAM OXALATE 20 MG: 10 TABLET ORAL at 08:43

## 2024-05-25 RX ADMIN — FLUDROCORTISONE ACETATE 50 MCG: 0.1 TABLET ORAL at 11:44

## 2024-05-25 RX ADMIN — Medication 10 ML: at 08:46

## 2024-05-25 RX ADMIN — MIDODRINE HYDROCHLORIDE 5 MG: 5 TABLET ORAL at 11:44

## 2024-05-25 RX ADMIN — MIDODRINE HYDROCHLORIDE 5 MG: 5 TABLET ORAL at 08:43

## 2024-05-25 RX ADMIN — INSULIN GLARGINE 20 UNITS: 100 INJECTION, SOLUTION SUBCUTANEOUS at 08:44

## 2024-05-25 NOTE — DISCHARGE SUMMARY
[facility]         HOSPITALIST  DISCHARGE SUMMARY    Patient Name: Fantasma Mcintosh  : 1965  MRN: [id number]    Date of Admission: 2024  Date of Discharge: 2024  Primary Care Physician: Anish Alanis MD    Consults       Date and Time Order Name Status Description    2024  9:50 PM Hospitalist (on-call MD unless specified)              Active and Resolved Hospital Problems:  Active Hospital Problems    Diagnosis POA    **Generalized weakness [R53.1] Unknown    Acute febrile illness [R50.9] Unknown    Hyperglycemia [R73.9] Unknown    Uncontrolled diabetes mellitus with hyperglycemia [E11.65] Unknown    Frequent falls [R29.6] Not Applicable    Hypotension [I95.9] Yes    Atrial fibrillation with RVR [I48.91] Yes      Resolved Hospital Problems   No resolved problems to display.   Fever, questionable community-acquired pneumonia due to unknown bacterial source, presumed gram-negative  Orthostatic hypotension  Hypoglycemia  Poorly controlled type 2 diabetes mellitus  Atrial fibrillation on Xarelto  Chronic diastolic congestive heart failure, not in acute exacerbation  COPD  Possible developmental delay    Hospital Course     Hospital Course:  Fantasma Mcintosh is a 59 y.o. male with past medical history of uncontrolled diabetes, hypothyroidism, paroxysmal A-fib status post cardioversion, CHF, and COPD presented to the ED for evaluation of generalized weakness, frequent falls, low blood pressures.  He was recently seen in his cardiologist office where medications were adjusted by stopping his Cardizem and reducing Coreg.  He has also been having fevers over the last few days.  He was admitted for further care, found to have orthostatic hypotension.  Was fluid resuscitated, started on midodrine with lower extremity compression stockings with improvement of his orthostasis.  Coreg discontinued at discharge.  Was found to have fevers, workup revealed possible pneumonia, fevers  resolved with antibiotics.  He is transition oral cefdinir to complete a 5-day course of antibiotics.  He was discharged home in stable condition on 5/25/2024.  Recommend follow-up with PCP within 1 week, cardiology within 2 weeks.    DISCHARGE Follow Up Recommendations for labs and diagnostics: Recommend recheck orthostatics within 1-2 weeks    Day of Discharge     Vital Signs:  Temp:  [97.3 °F (36.3 °C)-98.6 °F (37 °C)] 97.9 °F (36.6 °C)  Heart Rate:  [54-64] 60  Resp:  [18-22] 18  BP: ()/(53-82) 101/65  Physical Exam:   Gen: NAD, WDWN  ENT: PERRL, EOMI   CV: RRR no MRG  Pulm: CTAB, no w/r/r  GI: Abd soft, NTND, +bs  Neuro: Moving all extremities spontaneously, CN II-XII grossly intact   Psych: A&O*3, normal mood and affect  Skin: No lesions or rashes noted    Discharge Details        Discharge Medications        New Medications        Instructions Start Date   cefdinir 300 MG capsule  Commonly known as: OMNICEF   300 mg, Oral, 2 Times Daily      midodrine 5 MG tablet  Commonly known as: PROAMATINE   5 mg, Oral, 3 Times Daily Before Meals             Continue These Medications        Instructions Start Date   albuterol sulfate  (90 Base) MCG/ACT inhaler  Commonly known as: PROVENTIL HFA;VENTOLIN HFA;PROAIR HFA   2 puffs, Inhalation, Every 4 Hours PRN      aspirin 81 MG EC tablet   81 mg, Oral, Daily      budesonide 0.5 MG/2ML nebulizer solution  Commonly known as: PULMICORT   0.5 mg, Nebulization, Daily - RT      cholecalciferol 25 MCG (1000 UT) tablet  Commonly known as: VITAMIN D3   Take 2 tablets by mouth Daily.      escitalopram 20 MG tablet  Commonly known as: LEXAPRO   20 mg, Oral, Daily      Fiasp 100 UNIT/ML solution  Generic drug: Insulin Aspart (w/Niacinamide)   Injection, Continuous, Per V-Go 40 disposable pump      fish oil 1200 MG capsule capsule   1,200 mg, Oral, 2 Times Daily      gabapentin 600 MG tablet  Commonly known as: NEURONTIN   600 mg, Oral, 2 Times Daily      Jardiance 25 MG  tablet tablet  Generic drug: empagliflozin   1 tablet, Oral, Daily      levothyroxine 50 MCG tablet  Commonly known as: SYNTHROID, LEVOTHROID   50 mcg, Oral, Daily, 50 mcg daily except on Sunday take 100 mcg      levothyroxine 50 MCG tablet  Commonly known as: SYNTHROID, LEVOTHROID   100 mcg, Oral, Every 7 Days, Take 50 mcg daily except on Sunday take 100 mcg      loratadine 10 MG tablet  Commonly known as: CLARITIN   1 tablet, Oral, Daily      rosuvastatin 20 MG tablet  Commonly known as: CRESTOR   1 tablet, Oral, Every Night at Bedtime      sotalol 80 MG tablet  Commonly known as: BETAPACE   80 mg, Oral, Every 12 Hours Scheduled      V-Go 40 40 UNIT/24HR kit   Does not apply, Type of Insulin: Fiasp 100u/ml Type of Pump:Other: V-GO 40 DISPOSABLE Bolus amount: up to 36 units in 2-unit increments  ( 1 click=2 units) Basal amount : 40 units/24hr (1.67U/hr) basal rate and up to 36 units of on-demand bolus in 2-unit increments Correction factor: ? Insulin to carbohydrate ratio: ?  Date of last site change: ? Location of catheter:Right upper arm  Frequency of refill: every 24 hours per patient Last refill date: 05/21/2024 PM changes every PM per patient  Prescriber :  Leslie Delgado Phone number [phone]                Xarelto 20 MG tablet  Generic drug: rivaroxaban   1 tablet, Oral, Daily             Stop These Medications      carvedilol 12.5 MG tablet  Commonly known as: COREG     furosemide 40 MG tablet  Commonly known as: LASIX     spironolactone 25 MG tablet  Commonly known as: ALDACTONE     sulfamethoxazole-trimethoprim 800-160 MG per tablet  Commonly known as: BACTRIM DS,SEPTRA DS              Allergies   Allergen Reactions    Penicillins Itching       Discharge Disposition:  Home or Self Care    Diet:  Hospital:  Diet Order   Procedures    Diet: Cardiac, Diabetic; Healthy Heart (2-3 Na+); Consistent Carbohydrate; Fluid Consistency: Thin (IDDSI 0)       Discharge Activity:   Activity Instructions        Activity as Tolerated              CODE STATUS:  Code Status and Medical Interventions:   Ordered at: 05/22/24 2324     Level Of Support Discussed With:    Patient     Code Status (Patient has no pulse and is not breathing):    CPR (Attempt to Resuscitate)     Medical Interventions (Patient has pulse or is breathing):    Full Support         Future Appointments   Date Time Provider Department Center   6/18/2024 11:45 AM Nayeli Rivera APRN [facility]       Additional Instructions for the Follow-ups that You Need to Schedule       Discharge Follow-up with PCP   As directed       Currently Documented PCP:    Anish Alanis MD    PCP Phone Number:    [phone]     Follow Up Details: 3-5 days                Pertinent  and/or Most Recent Results     PROCEDURES:   None    LAB RESULTS:      Lab 05/25/24  0509 05/24/24 0450 05/23/24  0545 05/22/24 2104 05/22/24 1738 05/22/24 1737   WBC 5.32 5.65 6.15  --   --  6.16   HEMOGLOBIN 11.9* 12.1* 12.3*  --   --  13.0   HEMATOCRIT 35.4* 36.4* 37.1*  --   --  39.1   PLATELETS 131* 117* 126*  --   --  124*   NEUTROS ABS 2.45 2.83 4.06  --   --  4.62   IMMATURE GRANS (ABS)  --  0.01 0.01  --   --  0.01   LYMPHS ABS  --  1.61 1.06  --   --  0.64*   MONOS ABS  --  0.73 0.62  --   --  0.59   EOS ABS 0.05 0.45* 0.38  --   --  0.29   MCV 82.9 83.3 83.0  --   --  82.8   PROCALCITONIN  --   --  0.18  --   --   --    LACTATE  --   --   --  1.0 2.3*  --          Lab 05/25/24  0509 05/24/24 0450 05/23/24  0545 05/22/24 1737   SODIUM 136 134* 131* 129*   POTASSIUM 4.2 4.0 4.5 4.6   CHLORIDE 100 100 97* 90*   CO2 26.8 26.0 24.7 27.2   ANION GAP 9.2 8.0 9.3 11.8   BUN 22* 20 18 20   CREATININE 0.87 1.02 1.38* 1.74*   EGFR 100.0 85.2 59.3* 44.9*   GLUCOSE 54* 59* 310* 538*   CALCIUM 8.3* 8.2* 8.0* 8.6   MAGNESIUM  --   --   --  2.0   HEMOGLOBIN A1C  --   --   --  14.60*         Lab 05/22/24  1737   TOTAL PROTEIN 8.0   ALBUMIN 4.0   GLOBULIN 4.0   ALT (SGPT) 55*    AST (SGOT) 33   BILIRUBIN 0.4   ALK PHOS 120*         Lab 05/22/24  1737   HSTROP T 27*                 Brief Urine Lab Results  (Last result in the past 365 days)        Color   Clarity   Blood   Leuk Est   Nitrite   Protein   CREAT   Urine HCG        05/22/24 2004 Yellow   Clear   Trace   Negative   Negative   Negative                 Microbiology Results (last 10 days)       Procedure Component Value - Date/Time    Respiratory Culture - Sputum, Cough [[id number]] Collected: 05/23/24 1148    Lab Status: Final result Specimen: Sputum from Cough Updated: 05/25/24 1034     Respiratory Culture Scant growth (1+) Normal respiratory apple. No S. aureus or Pseudomonas aeruginosa detected. Final report.     Gram Stain Moderate (3+) WBCs seen      Few (2+) Gram positive cocci in pairs, chains and clusters      Rare (1+) Epithelial cells seen    S. Pneumo Ag Urine or CSF - Urine, Urine, Clean Catch [[id number]]  (Normal) Collected: 05/23/24 1146    Lab Status: Final result Specimen: Urine, Clean Catch Updated: 05/23/24 1212     Strep Pneumo Ag Negative    Legionella Antigen, Urine - Urine, Urine, Clean Catch [[id number]]  (Normal) Collected: 05/23/24 1146    Lab Status: Final result Specimen: Urine, Clean Catch Updated: 05/23/24 1212     LEGIONELLA ANTIGEN, URINE Negative    Respiratory Panel PCR w/COVID-19(SARS-CoV-2) RICHARD/HEIDE/MAY/PAD/COR/DIONISIO In-House, NP Swab in UTM/VTM, 2 HR TAT - Swab, Nasopharynx [[id number]]  (Normal) Collected: 05/23/24 1110    Lab Status: Final result Specimen: Swab from Nasopharynx Updated: 05/23/24 1217     ADENOVIRUS, PCR Not Detected     Coronavirus 229E Not Detected     Coronavirus HKU1 Not Detected     Coronavirus NL63 Not Detected     Coronavirus OC43 Not Detected     COVID19 Not Detected     Human Metapneumovirus Not Detected     Human Rhinovirus/Enterovirus Not Detected     Influenza A PCR Not Detected     Influenza B PCR Not Detected     Parainfluenza Virus 1 Not Detected     Parainfluenza  Virus 2 Not Detected     Parainfluenza Virus 3 Not Detected     Parainfluenza Virus 4 Not Detected     RSV, PCR Not Detected     Bordetella pertussis pcr Not Detected     Bordetella parapertussis PCR Not Detected     Chlamydophila pneumoniae PCR Not Detected     Mycoplasma pneumo by PCR Not Detected    Narrative:      In the setting of a positive respiratory panel with a viral infection PLUS a negative procalcitonin without other underlying concern for bacterial infection, consider observing off antibiotics or discontinuation of antibiotics and continue supportive care. If the respiratory panel is positive for atypical bacterial infection (Bordetella pertussis, Chlamydophila pneumoniae, or Mycoplasma pneumoniae), consider antibiotic de-escalation to target atypical bacterial infection.    Blood Culture - Blood, Hand, Left [[id number]]  (Normal) Collected: 05/22/24 2104    Lab Status: Preliminary result Specimen: Blood from Hand, Left Updated: 05/24/24 2116     Blood Culture No growth at 2 days    Blood Culture - Blood, Hand, Right [[id number]]  (Normal) Collected: 05/22/24 2104    Lab Status: Preliminary result Specimen: Blood from Hand, Right Updated: 05/24/24 2116     Blood Culture No growth at 2 days    COVID-19, FLU A/B, RSV PCR 1 HR TAT - Swab, Nasopharynx [[id number]]  (Normal) Collected: 05/22/24 2006    Lab Status: Final result Specimen: Swab from Nasopharynx Updated: 05/22/24 2048     COVID19 Not Detected     Influenza A PCR Not Detected     Influenza B PCR Not Detected     RSV, PCR Not Detected    Narrative:      Fact sheet for providers: https://www.fda.gov/media/686093/download    Fact sheet for patients: https://www.fda.gov/media/627257/download    Test performed by PCR.            CT Head Without Contrast    Result Date: 5/22/2024  Impression:  1. No acute intracranial abnormality.    Electronically Signed By-Evin Black MD On:5/22/2024 7:54 PM      XR Chest 1 View    Result Date:  5/22/2024  Impression:  1. No acute cardiopulmonary disease.   Electronically Signed By-Evin Black MD On:5/22/2024 5:53 PM                Results for orders placed during the hospital encounter of 05/14/24    Adult Transthoracic Echo Complete W/ Cont if Necessary Per Protocol    Interpretation Summary    Left ventricular systolic function is low normal. Left ventricular ejection fraction appears to be 51 - 55%.    The left ventricular cavity is mildly dilated.    Left ventricular diastolic function was normal.    Estimated right ventricular systolic pressure from tricuspid regurgitation is normal (<35 mmHg).    Mild fibrocalcific changes of the mitral and aortic valve    Mild mitral regurgitation      Labs Pending at Discharge:  Pending Labs       Order Current Status    Blood Culture - Blood, Hand, Left Preliminary result    Blood Culture - Blood, Hand, Right Preliminary result              Time spent on Discharge including face to face service: 34 minutes    Electronically signed by Erasmo Mcmahan MD, 05/25/24, 2:27 PM EDT.

## 2024-05-26 NOTE — OUTREACH NOTE
Prep Survey      Flowsheet Row Responses   [facility] patient discharged from? [facility]   Is LACE score < 7 ? No   Eligibility Readm Mgmt   Discharge diagnosis *Generalized weakness (   Does the patient have one of the following disease processes/diagnoses(primary or secondary)? Other   Does the patient have Home health ordered? No   Is there a DME ordered? Yes   Prep survey completed? Yes            JASON MARES - Registered Nurse

## 2024-05-27 ENCOUNTER — READMISSION MANAGEMENT (OUTPATIENT)
Dept: CALL CENTER | Facility: HOSPITAL | Age: 59
End: 2024-05-27
Payer: MEDICARE

## 2024-05-27 ENCOUNTER — HOSPITAL ENCOUNTER (INPATIENT)
Facility: HOSPITAL | Age: 59
LOS: 2 days | Discharge: HOME HEALTH | End: 2024-05-29
Attending: EMERGENCY MEDICINE | Admitting: STUDENT IN AN ORGANIZED HEALTH CARE EDUCATION/TRAINING PROGRAM
Payer: MEDICARE

## 2024-05-27 DIAGNOSIS — T38.3X5A HYPOGLYCEMIA DUE TO INSULIN: Primary | ICD-10-CM

## 2024-05-27 DIAGNOSIS — R26.2 DIFFICULTY WALKING: ICD-10-CM

## 2024-05-27 DIAGNOSIS — E16.0 HYPOGLYCEMIA DUE TO INSULIN: Primary | ICD-10-CM

## 2024-05-27 PROBLEM — E16.2 HYPOGLYCEMIA: Status: ACTIVE | Noted: 2024-05-27

## 2024-05-27 LAB
ALBUMIN SERPL-MCNC: 4.1 G/DL (ref 3.5–5.2)
ALBUMIN/GLOB SERPL: 1.1 G/DL
ALP SERPL-CCNC: 93 U/L (ref 39–117)
ALT SERPL W P-5'-P-CCNC: 48 U/L (ref 1–41)
ANION GAP SERPL CALCULATED.3IONS-SCNC: 10.2 MMOL/L (ref 5–15)
AST SERPL-CCNC: 34 U/L (ref 1–40)
BACTERIA SPEC AEROBE CULT: NORMAL
BACTERIA SPEC AEROBE CULT: NORMAL
BACTERIA UR QL AUTO: NORMAL /HPF
BASOPHILS # BLD AUTO: 0.04 10*3/MM3 (ref 0–0.2)
BASOPHILS NFR BLD AUTO: 0.5 % (ref 0–1.5)
BILIRUB SERPL-MCNC: 0.5 MG/DL (ref 0–1.2)
BILIRUB UR QL STRIP: NEGATIVE
BUN SERPL-MCNC: 22 MG/DL (ref 6–20)
BUN/CREAT SERPL: 26.5 (ref 7–25)
CALCIUM SPEC-SCNC: 9.4 MG/DL (ref 8.6–10.5)
CHLORIDE SERPL-SCNC: 100 MMOL/L (ref 98–107)
CLARITY UR: CLEAR
CO2 SERPL-SCNC: 30.8 MMOL/L (ref 22–29)
COLOR UR: YELLOW
CREAT SERPL-MCNC: 0.83 MG/DL (ref 0.76–1.27)
D-LACTATE SERPL-SCNC: 0.8 MMOL/L (ref 0.5–2)
DEPRECATED RDW RBC AUTO: 48.8 FL (ref 37–54)
EGFRCR SERPLBLD CKD-EPI 2021: 101.4 ML/MIN/1.73
EOSINOPHIL # BLD AUTO: 0.05 10*3/MM3 (ref 0–0.4)
EOSINOPHIL NFR BLD AUTO: 0.6 % (ref 0.3–6.2)
ERYTHROCYTE [DISTWIDTH] IN BLOOD BY AUTOMATED COUNT: 15.7 % (ref 12.3–15.4)
GLOBULIN UR ELPH-MCNC: 3.8 GM/DL
GLUCOSE BLDC GLUCOMTR-MCNC: 121 MG/DL (ref 70–99)
GLUCOSE BLDC GLUCOMTR-MCNC: 130 MG/DL (ref 70–99)
GLUCOSE BLDC GLUCOMTR-MCNC: 169 MG/DL (ref 70–99)
GLUCOSE BLDC GLUCOMTR-MCNC: 17 MG/DL (ref 70–99)
GLUCOSE BLDC GLUCOMTR-MCNC: 181 MG/DL (ref 70–99)
GLUCOSE BLDC GLUCOMTR-MCNC: 195 MG/DL (ref 70–99)
GLUCOSE BLDC GLUCOMTR-MCNC: 228 MG/DL (ref 70–99)
GLUCOSE BLDC GLUCOMTR-MCNC: 23 MG/DL (ref 70–99)
GLUCOSE BLDC GLUCOMTR-MCNC: 62 MG/DL (ref 70–99)
GLUCOSE SERPL-MCNC: 90 MG/DL (ref 65–99)
GLUCOSE UR STRIP-MCNC: ABNORMAL MG/DL
HCT VFR BLD AUTO: 45.9 % (ref 37.5–51)
HGB BLD-MCNC: 14.9 G/DL (ref 13–17.7)
HGB UR QL STRIP.AUTO: NEGATIVE
HOLD SPECIMEN: NORMAL
HOLD SPECIMEN: NORMAL
HYALINE CASTS UR QL AUTO: NORMAL /LPF
IMM GRANULOCYTES # BLD AUTO: 0.02 10*3/MM3 (ref 0–0.05)
IMM GRANULOCYTES NFR BLD AUTO: 0.3 % (ref 0–0.5)
KETONES UR QL STRIP: NEGATIVE
LEUKOCYTE ESTERASE UR QL STRIP.AUTO: NEGATIVE
LIPASE SERPL-CCNC: 12 U/L (ref 13–60)
LYMPHOCYTES # BLD AUTO: 1.68 10*3/MM3 (ref 0.7–3.1)
LYMPHOCYTES NFR BLD AUTO: 21.7 % (ref 19.6–45.3)
MCH RBC QN AUTO: 27.4 PG (ref 26.6–33)
MCHC RBC AUTO-ENTMCNC: 32.5 G/DL (ref 31.5–35.7)
MCV RBC AUTO: 84.5 FL (ref 79–97)
MONOCYTES # BLD AUTO: 0.17 10*3/MM3 (ref 0.1–0.9)
MONOCYTES NFR BLD AUTO: 2.2 % (ref 5–12)
NEUTROPHILS NFR BLD AUTO: 5.79 10*3/MM3 (ref 1.7–7)
NEUTROPHILS NFR BLD AUTO: 74.7 % (ref 42.7–76)
NITRITE UR QL STRIP: NEGATIVE
NRBC BLD AUTO-RTO: 0 /100 WBC (ref 0–0.2)
PH UR STRIP.AUTO: 5.5 [PH] (ref 5–8)
PLATELET # BLD AUTO: 245 10*3/MM3 (ref 140–450)
PMV BLD AUTO: 10.5 FL (ref 6–12)
POTASSIUM SERPL-SCNC: 3.6 MMOL/L (ref 3.5–5.2)
PROT SERPL-MCNC: 7.9 G/DL (ref 6–8.5)
PROT UR QL STRIP: ABNORMAL
QT INTERVAL: 386 MS
QT INTERVAL: 386 MS
QTC INTERVAL: 430 MS
QTC INTERVAL: 433 MS
RBC # BLD AUTO: 5.43 10*6/MM3 (ref 4.14–5.8)
RBC # UR STRIP: NORMAL /HPF
REF LAB TEST METHOD: NORMAL
SODIUM SERPL-SCNC: 141 MMOL/L (ref 136–145)
SP GR UR STRIP: >=1.03 (ref 1–1.03)
SQUAMOUS #/AREA URNS HPF: NORMAL /HPF
UROBILINOGEN UR QL STRIP: ABNORMAL
WBC # UR STRIP: NORMAL /HPF
WBC NRBC COR # BLD AUTO: 7.75 10*3/MM3 (ref 3.4–10.8)
WHOLE BLOOD HOLD COAG: NORMAL
WHOLE BLOOD HOLD SPECIMEN: NORMAL

## 2024-05-27 PROCEDURE — 82948 REAGENT STRIP/BLOOD GLUCOSE: CPT

## 2024-05-27 PROCEDURE — 99222 1ST HOSP IP/OBS MODERATE 55: CPT | Performed by: STUDENT IN AN ORGANIZED HEALTH CARE EDUCATION/TRAINING PROGRAM

## 2024-05-27 PROCEDURE — 99291 CRITICAL CARE FIRST HOUR: CPT

## 2024-05-27 PROCEDURE — 85025 COMPLETE CBC W/AUTO DIFF WBC: CPT

## 2024-05-27 PROCEDURE — 36415 COLL VENOUS BLD VENIPUNCTURE: CPT

## 2024-05-27 PROCEDURE — 80053 COMPREHEN METABOLIC PANEL: CPT | Performed by: EMERGENCY MEDICINE

## 2024-05-27 PROCEDURE — 83690 ASSAY OF LIPASE: CPT | Performed by: EMERGENCY MEDICINE

## 2024-05-27 PROCEDURE — 93005 ELECTROCARDIOGRAM TRACING: CPT | Performed by: STUDENT IN AN ORGANIZED HEALTH CARE EDUCATION/TRAINING PROGRAM

## 2024-05-27 PROCEDURE — 81001 URINALYSIS AUTO W/SCOPE: CPT | Performed by: STUDENT IN AN ORGANIZED HEALTH CARE EDUCATION/TRAINING PROGRAM

## 2024-05-27 PROCEDURE — 83605 ASSAY OF LACTIC ACID: CPT | Performed by: EMERGENCY MEDICINE

## 2024-05-27 PROCEDURE — 83036 HEMOGLOBIN GLYCOSYLATED A1C: CPT | Performed by: STUDENT IN AN ORGANIZED HEALTH CARE EDUCATION/TRAINING PROGRAM

## 2024-05-27 RX ORDER — SODIUM CHLORIDE 0.9 % (FLUSH) 0.9 %
10 SYRINGE (ML) INJECTION EVERY 12 HOURS SCHEDULED
Status: DISCONTINUED | OUTPATIENT
Start: 2024-05-27 | End: 2024-05-29 | Stop reason: HOSPADM

## 2024-05-27 RX ORDER — BISACODYL 5 MG/1
5 TABLET, DELAYED RELEASE ORAL DAILY PRN
Status: DISCONTINUED | OUTPATIENT
Start: 2024-05-27 | End: 2024-05-29 | Stop reason: HOSPADM

## 2024-05-27 RX ORDER — SODIUM CHLORIDE 9 MG/ML
40 INJECTION, SOLUTION INTRAVENOUS AS NEEDED
Status: DISCONTINUED | OUTPATIENT
Start: 2024-05-27 | End: 2024-05-29 | Stop reason: HOSPADM

## 2024-05-27 RX ORDER — AMOXICILLIN 250 MG
2 CAPSULE ORAL 2 TIMES DAILY PRN
Status: DISCONTINUED | OUTPATIENT
Start: 2024-05-27 | End: 2024-05-29 | Stop reason: HOSPADM

## 2024-05-27 RX ORDER — BUDESONIDE 0.5 MG/2ML
0.5 INHALANT ORAL
Status: DISCONTINUED | OUTPATIENT
Start: 2024-05-28 | End: 2024-05-29 | Stop reason: HOSPADM

## 2024-05-27 RX ORDER — NITROGLYCERIN 0.4 MG/1
0.4 TABLET SUBLINGUAL
Status: DISCONTINUED | OUTPATIENT
Start: 2024-05-27 | End: 2024-05-29 | Stop reason: HOSPADM

## 2024-05-27 RX ORDER — CEFDINIR 300 MG/1
300 CAPSULE ORAL 2 TIMES DAILY
Status: COMPLETED | OUTPATIENT
Start: 2024-05-27 | End: 2024-05-28

## 2024-05-27 RX ORDER — DEXTROSE MONOHYDRATE 100 MG/ML
100 INJECTION, SOLUTION INTRAVENOUS CONTINUOUS
Status: DISCONTINUED | OUTPATIENT
Start: 2024-05-27 | End: 2024-05-28

## 2024-05-27 RX ORDER — POLYETHYLENE GLYCOL 3350 17 G/17G
17 POWDER, FOR SOLUTION ORAL DAILY PRN
Status: DISCONTINUED | OUTPATIENT
Start: 2024-05-27 | End: 2024-05-29 | Stop reason: HOSPADM

## 2024-05-27 RX ORDER — SODIUM CHLORIDE 0.9 % (FLUSH) 0.9 %
10 SYRINGE (ML) INJECTION AS NEEDED
Status: DISCONTINUED | OUTPATIENT
Start: 2024-05-27 | End: 2024-05-29 | Stop reason: HOSPADM

## 2024-05-27 RX ORDER — MIDODRINE HYDROCHLORIDE 5 MG/1
5 TABLET ORAL
Status: DISCONTINUED | OUTPATIENT
Start: 2024-05-27 | End: 2024-05-29 | Stop reason: HOSPADM

## 2024-05-27 RX ORDER — BISACODYL 10 MG
10 SUPPOSITORY, RECTAL RECTAL DAILY PRN
Status: DISCONTINUED | OUTPATIENT
Start: 2024-05-27 | End: 2024-05-29 | Stop reason: HOSPADM

## 2024-05-27 RX ORDER — ROSUVASTATIN CALCIUM 20 MG/1
20 TABLET, COATED ORAL NIGHTLY
Status: DISCONTINUED | OUTPATIENT
Start: 2024-05-27 | End: 2024-05-29 | Stop reason: HOSPADM

## 2024-05-27 RX ORDER — ARFORMOTEROL TARTRATE 15 UG/2ML
15 SOLUTION RESPIRATORY (INHALATION)
Status: DISCONTINUED | OUTPATIENT
Start: 2024-05-27 | End: 2024-05-29 | Stop reason: HOSPADM

## 2024-05-27 RX ORDER — DEXTROSE MONOHYDRATE 25 G/50ML
25 INJECTION, SOLUTION INTRAVENOUS ONCE
Status: COMPLETED | OUTPATIENT
Start: 2024-05-27 | End: 2024-05-27

## 2024-05-27 RX ORDER — SOTALOL HYDROCHLORIDE 80 MG/1
80 TABLET ORAL EVERY 12 HOURS SCHEDULED
Status: DISCONTINUED | OUTPATIENT
Start: 2024-05-27 | End: 2024-05-29 | Stop reason: HOSPADM

## 2024-05-27 RX ORDER — IPRATROPIUM BROMIDE AND ALBUTEROL SULFATE 2.5; .5 MG/3ML; MG/3ML
3 SOLUTION RESPIRATORY (INHALATION) EVERY 6 HOURS PRN
Status: DISCONTINUED | OUTPATIENT
Start: 2024-05-27 | End: 2024-05-29 | Stop reason: HOSPADM

## 2024-05-27 RX ADMIN — SOTALOL HYDROCHLORIDE 80 MG: 80 TABLET ORAL at 22:08

## 2024-05-27 RX ADMIN — DEXTROSE MONOHYDRATE 25 G: 25 INJECTION, SOLUTION INTRAVENOUS at 20:10

## 2024-05-27 RX ADMIN — DEXTROSE MONOHYDRATE 25 G: 25 INJECTION, SOLUTION INTRAVENOUS at 18:25

## 2024-05-27 RX ADMIN — DEXTROSE MONOHYDRATE 100 ML/HR: 100 INJECTION, SOLUTION INTRAVENOUS at 20:22

## 2024-05-27 RX ADMIN — MIDODRINE HYDROCHLORIDE 5 MG: 5 TABLET ORAL at 22:08

## 2024-05-27 RX ADMIN — CEFDINIR 300 MG: 300 CAPSULE ORAL at 22:08

## 2024-05-27 RX ADMIN — ROSUVASTATIN 20 MG: 20 TABLET, FILM COATED ORAL at 22:08

## 2024-05-27 RX ADMIN — Medication 10 ML: at 22:08

## 2024-05-27 NOTE — ED PROVIDER NOTES
Time: 7:58 PM EDT  Date of encounter:  5/27/2024  Independent Historian/Clinical History and Information was obtained by:   Patient, Family, and EMS  Chief Complaint: Low blood sugar    History is limited by:  Patient is a poor history provider    History of Present Illness:  Patient is a 59 y.o. year old male who presents to the emergency department for evaluation of low blood sugar.  Patient presents with family.  Patient comes from home.  Patient reportedly been sleeping all day and was difficult to arouse.  EMS was called and found the patient had a blood sugar 17.  Patient was treated with D10.  Family states patient has not eaten since last night.  They also state that he does not check his blood sugars.  The patient cannot tell me what medications he is taking.  Patient was found to have a type of insulin pump on his right arm that after further investigation it appears to have a continuous infusion or a button on it that he can push to administer a bolus.  Thus it is unknown how much insulin the patient has been given.    HPI    Patient Care Team  Primary Care Provider: Anish Alanis MD    Past Medical History:     Allergies   Allergen Reactions    Penicillins Itching     Past Medical History:   Diagnosis Date    Diabetes mellitus     Disease of thyroid gland      History reviewed. No pertinent surgical history.  History reviewed. No pertinent family history.    Home Medications:  Prior to Admission medications    Medication Sig Start Date End Date Taking? Authorizing Provider   albuterol sulfate  (90 Base) MCG/ACT inhaler Inhale 2 puffs Every 4 (Four) Hours As Needed. 2/8/24   Keli Hernandez MD   aspirin 81 MG EC tablet Take 1 tablet by mouth Daily.    Keli Hernandez MD   budesonide (PULMICORT) 0.5 MG/2ML nebulizer solution Take 2 mL by nebulization Daily. 2/22/24   Keli Hernandez MD   cefdinir (OMNICEF) 300 MG capsule Take 1 capsule by mouth 2 (Two) Times a Day for 3  days. 5/25/24 5/28/24  Erasmo Schwab MD   Cholecalciferol 25 MCG (1000 UT) tablet Take 2 tablets by mouth Daily.    Keli Hernandez MD   escitalopram (LEXAPRO) 20 MG tablet Take 1 tablet by mouth Daily.    Keli Hernandez MD   gabapentin (NEURONTIN) 600 MG tablet Take 1 tablet by mouth 2 (Two) Times a Day. 4/25/24   Keli Hernandez MD   Insulin Aspart, w/Niacinamide, (Fiasp) 100 UNIT/ML solution Inject  as directed Continuous. Per V-Go 40 disposable pump    Keli Hernandez MD   Insulin Disposable Pump (V-Go 40) 40 UNIT/24HR kit Use. Type of Insulin: Fiasp 100u/ml  Type of Pump:Other: V-GO 40 DISPOSABLE  Bolus amount: up to 36 units in 2-unit increments  ( 1 click=2 units)  Basal amount : 40 units/24hr (1.67U/hr) basal rate and up to 36 units of on-demand bolus in 2-unit increments  Correction factor: ?  Insulin to carbohydrate ratio: ?    Date of last site change: ?  Location of catheter:Right upper arm    Frequency of refill: every 24 hours per patient  Last refill date: 05/21/2024 PM changes every PM per patient    Prescriber :  Leslie Delgado  Phone number [phone]    Keli Hernandez MD   Jardiance 25 MG tablet tablet Take 1 tablet by mouth Daily.    Keli Hernandez MD   levothyroxine (SYNTHROID, LEVOTHROID) 50 MCG tablet Take 1 tablet by mouth Daily. 50 mcg daily except on Sunday take 100 mcg    Keli Hernandez MD   levothyroxine (SYNTHROID, LEVOTHROID) 50 MCG tablet Take 2 tablets by mouth Every 7 (Seven) Days. Take 50 mcg daily except on Sunday take 100 mcg    Keli Hernandez MD   loratadine (CLARITIN) 10 MG tablet Take 1 tablet by mouth Daily. 2/8/24   Keli Hernandez MD   midodrine (PROAMATINE) 5 MG tablet Take 1 tablet by mouth 3 (Three) Times a Day Before Meals for 30 days. 5/25/24 6/24/24  Erasmo Schwab MD   Omega-3 Fatty Acids (fish oil) 1200 MG capsule capsule Take 1 capsule by mouth 2 (Two) Times a Day.    Keli Hernandez  "MD   rosuvastatin (CRESTOR) 20 MG tablet Take 1 tablet by mouth every night at bedtime.    Provider, Historical, MD   sotalol (BETAPACE) 80 MG tablet Take 1 tablet by mouth Every 12 (Twelve) Hours. 5/21/24   Nayeli Rivera APRN   Xarelto 20 MG tablet Take 1 tablet by mouth Daily.    Provider, Historical, MD        Social History:   Social History     Tobacco Use    Smoking status: Every Day     Current packs/day: 1.00     Average packs/day: 1 pack/day for 15.0 years (15.0 ttl pk-yrs)     Types: Cigarettes    Smokeless tobacco: Never   Vaping Use    Vaping status: Never Used   Substance Use Topics    Alcohol use: Not Currently    Drug use: Not Currently         Review of Systems:  Review of Systems   Constitutional:  Positive for fatigue. Negative for chills and fever.   HENT:  Negative for congestion, ear pain and sore throat.    Eyes:  Negative for pain.   Respiratory:  Negative for cough, chest tightness and shortness of breath.    Cardiovascular:  Negative for chest pain.   Gastrointestinal:  Negative for abdominal pain, diarrhea, nausea and vomiting.   Genitourinary:  Negative for flank pain and hematuria.   Musculoskeletal:  Negative for joint swelling.   Skin:  Negative for pallor.   Neurological:  Negative for seizures and headaches.   Psychiatric/Behavioral:  Positive for confusion.    All other systems reviewed and are negative.       Physical Exam:  /93   Pulse 60   Temp 94.4 °F (34.7 °C) (Oral)   Resp 22   Ht 193 cm (76\")   Wt 89.9 kg (198 lb 3.1 oz)   SpO2 97%   BMI 24.12 kg/m²     Physical Exam    Vital signs were reviewed under triage note.  General appearance - Patient appears well-developed and well-nourished.  Patient is in no acute distress.  Head - Normocephalic, atraumatic.  Pupils - Equal, round, reactive to light.  Extraocular muscles are intact.  Conjunctiva is clear.  Nasal - Normal inspection.  No evidence of trauma or epistaxis.  Tympanic membranes - Gray, intact " without erythema or retractions.  Oral mucosa - Pink and moist without lesions or erythema.  Uvula is midline.  Chest wall - Atraumatic.  Chest wall is nontender.  There are no vesicular rashes noted.  Neck - Supple.  Trachea was midline.  There is no palpable lymphadenopathy or thyromegaly.  There are no meningeal signs  Lungs - Clear to auscultation and percussion bilaterally.  Heart - Regular rate and rhythm without any murmurs, clicks, or gallops.  Abdomen - Soft.  Bowel sounds are present.  There is no palpable tenderness.  There is no rebound, guarding, or rigidity.  There are no palpable masses.  There are no pulsatile masses.  Back - Spine is straight and midline.  There is no CVA tenderness.  Extremities - Intact x4 with full range of motion.  There is no palpable edema.  Pulses are intact x4 and equal.  Neurologic - Patient is awake and alert.  Patient will answer some questions but other times seems to have some confusion.  Cranial nerves II through XII are grossly intact.  Motor and sensory functions grossly intact.  Cerebellar function was normal.  Integument - There are no rashes.  There are no petechia or purpura lesions noted.  There are no vesicular lesions noted.           Procedures:  Procedures      Medical Decision Making:      Comorbidities that affect care:    Diabetes, hypothyroidism, hyperlipidemia, hypertension .  The patient is also on Xarelto however the patient does not know why.    External Notes reviewed:    Hospital Discharge Summary: Hospital discharge summary from 5/25/2024 was reviewed by me.      The following orders were placed and all results were independently analyzed by me:  Orders Placed This Encounter   Procedures    Hector Draw    Comprehensive Metabolic Panel    Lipase    Urinalysis With Microscopic If Indicated (No Culture) - Urine, Clean Catch    Lactic Acid, Plasma    CBC Auto Differential    NPO Diet NPO Type: Strict NPO    Undress & Gown    Hospitalist (on-call MD  unless specified)    POC Glucose Once    POC Glucose Once    POC Glucose Once    POC Glucose Once    POC Glucose Once    Insert Peripheral IV    Inpatient Admission    CBC & Differential    Green Top (Gel)    Lavender Top    Gold Top - SST    Light Blue Top       Medications Given in the Emergency Department:  Medications   sodium chloride 0.9 % flush 10 mL (has no administration in time range)   dextrose 10 % infusion (has no administration in time range)   dextrose (D50W) (25 g/50 mL) IV injection 25 g (25 g Intravenous Given 5/27/24 1825)        ED Course:     The patient was seen and evaluated in the ED by me.  The above history and physical examination was performed as documented.  Diagnostic data was obtained.  Results reviewed.  During the ED course the patient's blood sugar again bottomed out 17.  Patient was also started on continuous dextrose infusion.  It was found during the ED exam and visit the patient had a insulin pump that was still providing insulin and this was taken off by nursing staff.  Is clear the patient is not clearly able to manage his blood sugar well.  Due to the profound hypoglycemia and unknown amount of insulin the patient will be admitted for close monitoring and continuous treatment of his hyperglycemia.  Hospital service was consulted for admission.    Labs:    Lab Results (last 24 hours)       Procedure Component Value Units Date/Time    POC Glucose Once [[id number]]  (Abnormal) Collected: 05/27/24 1553    Specimen: Blood Updated: 05/27/24 1555     Glucose 130 mg/dL      Comment: Serial Number: [id number]:  [id number]       CBC & Differential [[id number]]  (Abnormal) Collected: 05/27/24 1559    Specimen: Blood Updated: 05/27/24 1610    Narrative:      The following orders were created for panel order CBC & Differential.  Procedure                               Abnormality         Status                     ---------                               -----------         ------                      CBC Auto Differential[[id number]]        Abnormal            Final result                 Please view results for these tests on the individual orders.    Comprehensive Metabolic Panel [[id number]]  (Abnormal) Collected: 05/27/24 1559    Specimen: Blood Updated: 05/27/24 1631     Glucose 90 mg/dL      BUN 22 mg/dL      Creatinine 0.83 mg/dL      Sodium 141 mmol/L      Potassium 3.6 mmol/L      Chloride 100 mmol/L      CO2 30.8 mmol/L      Calcium 9.4 mg/dL      Total Protein 7.9 g/dL      Albumin 4.1 g/dL      ALT (SGPT) 48 U/L      AST (SGOT) 34 U/L      Alkaline Phosphatase 93 U/L      Total Bilirubin 0.5 mg/dL      Globulin 3.8 gm/dL      A/G Ratio 1.1 g/dL      BUN/Creatinine Ratio 26.5     Anion Gap 10.2 mmol/L      eGFR 101.4 mL/min/1.73     Narrative:      GFR Normal >60  Chronic Kidney Disease <60  Kidney Failure <15      Lipase [[id number]]  (Abnormal) Collected: 05/27/24 1559    Specimen: Blood Updated: 05/27/24 1631     Lipase 12 U/L     Lactic Acid, Plasma [[id number]]  (Normal) Collected: 05/27/24 1559    Specimen: Blood Updated: 05/27/24 1631     Lactate 0.8 mmol/L     CBC Auto Differential [[id number]]  (Abnormal) Collected: 05/27/24 1559    Specimen: Blood Updated: 05/27/24 1610     WBC 7.75 10*3/mm3      RBC 5.43 10*6/mm3      Hemoglobin 14.9 g/dL      Hematocrit 45.9 %      MCV 84.5 fL      MCH 27.4 pg      MCHC 32.5 g/dL      RDW 15.7 %      RDW-SD 48.8 fl      MPV 10.5 fL      Platelets 245 10*3/mm3      Neutrophil % 74.7 %      Lymphocyte % 21.7 %      Monocyte % 2.2 %      Eosinophil % 0.6 %      Basophil % 0.5 %      Immature Grans % 0.3 %      Neutrophils, Absolute 5.79 10*3/mm3      Lymphocytes, Absolute 1.68 10*3/mm3      Monocytes, Absolute 0.17 10*3/mm3      Eosinophils, Absolute 0.05 10*3/mm3      Basophils, Absolute 0.04 10*3/mm3      Immature Grans, Absolute 0.02 10*3/mm3      nRBC 0.0 /100 WBC     POC Glucose Once [[id number]]  (Abnormal) Collected: 05/27/24 3393    Specimen:  Blood Updated: 05/27/24 1804     Glucose 17 mg/dL      Comment: Serial Number: [id number]:  [id number]       POC Glucose Once [[id number]]  (Abnormal) Collected: 05/27/24 1805    Specimen: Blood Updated: 05/27/24 1808     Glucose 23 mg/dL      Comment: Serial Number: [id number]:  [id number]       POC Glucose Once [[id number]]  (Abnormal) Collected: 05/27/24 1845    Specimen: Blood Updated: 05/27/24 1846     Glucose 121 mg/dL      Comment: Serial Number: [id number]:  [id number]       POC Glucose Once [[id number]]  (Abnormal) Collected: 05/27/24 1944    Specimen: Blood Updated: 05/27/24 1946     Glucose 62 mg/dL      Comment: Serial Number: [id number]:  [id number]                Imaging:    No Radiology Exams Resulted Within Past 24 Hours      Differential Diagnosis and Discussion:    Metabolic: Differential diagnosis includes but is not limited to hypertension, hyperglycemia, hyperkalemia, hypocalcemia, metabolic acidosis, hypokalemia, hypoglycemia, malnutrition, hypothyroidism, hyperthyroidism, and adrenal insufficiency.     All labs were reviewed and interpreted by me.    MDM     Amount and/or Complexity of Data Reviewed  Decide to obtain previous medical records or to obtain history from someone other than the patient: yes         Critical Care Note: Total Critical Care time of 35 minutes. Total critical care time documented does not include time spent on separately billed procedures for services of nurses or physician assistants. I personally saw and examined the patient. I have reviewed all diagnostic interpretations and treatment plans as written. I was present for the key portions of any procedures performed and the inclusive time noted in any critical care statement. Critical care time includes patient management by me, time spent at the patients bedside,  time to review lab and imaging results, discussing patient care, documentation in the medical record, and time spent with family or  caregiver.    Patient Care Considerations:    SEPSIS was considered but is NOT present in the emergency department as SIRS criteria is not present.      Consultants/Shared Management Plan:    Hospitalist: I have discussed the case with Dr. Mak who agrees to accept the patient for admission.    Social Determinants of Health:    Patient has presented with family members who are responsible, reliable and will ensure follow up care.      Disposition and Care Coordination:    Admit:   Through independent evaluation of the patient's history, physical, and imperical data, the patient meets criteria for inpatient admission to the hospital.        Final diagnoses:   Hypoglycemia due to insulin        ED Disposition       ED Disposition   Decision to Admit    Condition   --    Comment   Level of Care: Critical Care [6]   Diagnosis: Hypoglycemia [835681]   Certification: I Certify That Inpatient Hospital Services Are Medically Necessary For Greater Than 2 Midnights                 This medical record created using voice recognition software.             Rubin Grullon DO  05/29/24 0926

## 2024-05-27 NOTE — H&P
[facility] HISTORY AND PHYSICAL  Date: 2024   Patient Name: Fantasma Mcintosh  : 1965  MRN: [id number]  Primary Care Physician:  Anish Alanis MD  Date of admission: 2024    Subjective   Subjective     Chief Complaint: Drowsy, hypoglycemia    HPI:    Fantasma Mcintosh is a 59 y.o. male with a past medical history of paroxysmal A-fib s/p cardioversion on Xarelto, hypothyroidism, insulin-dependent diabetes mellitus on insulin pump, COPD presented to the ED for evaluation of hypoglycemia.  Patient has a continuous insulin pump that he changes every other day, changes in insulin pump last night, and patient did not eat this morning.  In the afternoon, patient's family members noted that he is very drowsy and difficulty arousing, called EMS.  Blood glucose level was 17 with EMS, received D10 infusion, blood glucose was 245 after that.  Patient had an episode of vomiting.  Denies any other complaints.  Denies any chest pain, shortness of breath, nausea, diarrhea, headache, blurring of vision, focal weakness, numbness, tingling.    His insulin pump delivers insulin aspart basal amount of 40 units over 24 hours, with a bolus of 36 units with 2 unit increments.    In the ED, vital signs temperature 94.4, pulse 60, respiratory 22, blood pressure 130/93 on room air saturating around 94%.  CMP with no significant findings, normal lipase, normal lactic acid, CBC with no significant findings.  Patient became hypoglycemic after coming to the ED and received amp of D50.  Blood glucose levels has improved to 121.  Started on D10 infusion at 100 cc/h.  Patient has been admitted for further evaluation and management of hypoglycemia.    Personal History     Past Medical History:   Diagnosis Date    Diabetes mellitus     Disease of thyroid gland          History reviewed. No pertinent surgical history.      History reviewed. No pertinent family history.      Social History     Socioeconomic History     Marital status:    Tobacco Use    Smoking status: Every Day     Current packs/day: 1.00     Average packs/day: 1 pack/day for 15.0 years (15.0 ttl pk-yrs)     Types: Cigarettes    Smokeless tobacco: Never   Vaping Use    Vaping status: Never Used   Substance and Sexual Activity    Alcohol use: Not Currently    Drug use: Not Currently         Home Medications:  Insulin Aspart (w/Niacinamide), V-Go 40, albuterol sulfate HFA, aspirin, budesonide, cefdinir, cholecalciferol, empagliflozin, escitalopram, fish oil, gabapentin, levothyroxine, loratadine, midodrine, rivaroxaban, rosuvastatin, and sotalol    Allergies:  Allergies   Allergen Reactions    Penicillins Itching       Review of Systems   All other systems reviewed and negative except as mentioned above in the HPI    Objective   Objective     Vitals:   Temp:  [94.4 °F (34.7 °C)] 94.4 °F (34.7 °C)  Heart Rate:  [60] 60  Resp:  [22] 22  BP: (130-162)/() 130/93    Physical Exam    Constitutional: Awake, alert, no acute distress   Eyes: Pupils equal, sclerae anicteric, no conjunctival injection   HENT: NCAT, mucous membranes moist   Respiratory: Clear to auscultation bilaterally, nonlabored respirations    Cardiovascular: RRR, no murmurs, rubs, or gallops, palpable pedal pulses bilaterally   Gastrointestinal: Positive bowel sounds, soft, nontender, nondistended   Musculoskeletal: No bilateral ankle edema, no clubbing or cyanosis to extremities   Neurologic: Oriented x 3, speech clear   Skin: No rashes     Result Review    Result Review:  I have personally reviewed the results from the time of this admission to 5/27/2024 20:28 EDT and agree with these findings:  [x]  Laboratory  []  Microbiology  []  Radiology  [x]  EKG/Telemetry   []  Cardiology/Vascular   []  Pathology  []  Old records  []  Other:        Imaging Results (Last 24 Hours)       ** No results found for the last 24 hours. **                   Assessment & Plan   Assessment / Plan      Assessment/Plan:   Hypoglycemia from poor oral intake and being on insulin pump  Recently diagnosed pneumonia, currently on cefdinir  Hypothyroidism  A-fib on Xarelto  Insulin-dependent diabetes mellitus  COPD  HFpEF, not in exacerbation    Plan  Admit to inpatient, critical care  Continue D10 half NS at 100 cc/h  Check blood glucose levels every hour, with a blood glucose level sustains greater than 200, will decrease the rate to 50 cc/h and monitor closely  Continue Xarelto, sotalol,  midodrine  Continue ceftriaxone 300 mg twice daily for 1 day  Brovana, Pulmicort  DuoNebs every 6 hours as needed  Bronchodilator protocol  Resume other appropriate home medications once reconciled    DVT prophylaxis:  Medical DVT prophylaxis orders are signed and held.      CODE STATUS:    Level Of Support Discussed With: Patient  Code Status (Patient has no pulse and is not breathing): CPR (Attempt to Resuscitate)  Medical Interventions (Patient has pulse or is breathing): Full Support      Admission Status:  I believe this patient meets inpatient status.    Part of this note may be an electronic transcription/translation of spoken language to printed text using the Dragon Dictation System    Nadine Mak MD

## 2024-05-27 NOTE — ED NOTES
Pt to ed from home with c/o hypoglycemia. BG 17 with EMS. 20 Right hand. 500 D10 with ems. 245 after bag given. 250 D10 KVO. Pt has insulin pump d/c. Pt was admitted recently for hypertensive crisis and has not been eating since d/c. Vomiting present with ems. Family thinks he may has aspirated.

## 2024-05-28 LAB
ALBUMIN SERPL-MCNC: 3.5 G/DL (ref 3.5–5.2)
ALBUMIN/GLOB SERPL: 1.1 G/DL
ALP SERPL-CCNC: 73 U/L (ref 39–117)
ALT SERPL W P-5'-P-CCNC: 35 U/L (ref 1–41)
ANION GAP SERPL CALCULATED.3IONS-SCNC: 10 MMOL/L (ref 5–15)
AST SERPL-CCNC: 24 U/L (ref 1–40)
BASOPHILS # BLD AUTO: 0.04 10*3/MM3 (ref 0–0.2)
BASOPHILS NFR BLD AUTO: 0.5 % (ref 0–1.5)
BILIRUB SERPL-MCNC: 0.4 MG/DL (ref 0–1.2)
BUN SERPL-MCNC: 24 MG/DL (ref 6–20)
BUN/CREAT SERPL: 31.2 (ref 7–25)
CALCIUM SPEC-SCNC: 8.4 MG/DL (ref 8.6–10.5)
CHLORIDE SERPL-SCNC: 99 MMOL/L (ref 98–107)
CO2 SERPL-SCNC: 27 MMOL/L (ref 22–29)
CREAT SERPL-MCNC: 0.77 MG/DL (ref 0.76–1.27)
DEPRECATED RDW RBC AUTO: 46.6 FL (ref 37–54)
EGFRCR SERPLBLD CKD-EPI 2021: 103.8 ML/MIN/1.73
EOSINOPHIL # BLD AUTO: 0.04 10*3/MM3 (ref 0–0.4)
EOSINOPHIL NFR BLD AUTO: 0.5 % (ref 0.3–6.2)
ERYTHROCYTE [DISTWIDTH] IN BLOOD BY AUTOMATED COUNT: 15.5 % (ref 12.3–15.4)
GLOBULIN UR ELPH-MCNC: 3.2 GM/DL
GLUCOSE BLDC GLUCOMTR-MCNC: 113 MG/DL (ref 70–99)
GLUCOSE BLDC GLUCOMTR-MCNC: 150 MG/DL (ref 70–99)
GLUCOSE BLDC GLUCOMTR-MCNC: 166 MG/DL (ref 70–99)
GLUCOSE BLDC GLUCOMTR-MCNC: 169 MG/DL (ref 70–99)
GLUCOSE BLDC GLUCOMTR-MCNC: 187 MG/DL (ref 70–99)
GLUCOSE BLDC GLUCOMTR-MCNC: 189 MG/DL (ref 70–99)
GLUCOSE BLDC GLUCOMTR-MCNC: 192 MG/DL (ref 70–99)
GLUCOSE BLDC GLUCOMTR-MCNC: 193 MG/DL (ref 70–99)
GLUCOSE BLDC GLUCOMTR-MCNC: 193 MG/DL (ref 70–99)
GLUCOSE BLDC GLUCOMTR-MCNC: 254 MG/DL (ref 70–99)
GLUCOSE BLDC GLUCOMTR-MCNC: 297 MG/DL (ref 70–99)
GLUCOSE BLDC GLUCOMTR-MCNC: 340 MG/DL (ref 70–99)
GLUCOSE SERPL-MCNC: 135 MG/DL (ref 65–99)
HBA1C MFR BLD: 15 % (ref 4.8–5.6)
HCT VFR BLD AUTO: 36.5 % (ref 37.5–51)
HGB BLD-MCNC: 12.3 G/DL (ref 13–17.7)
IMM GRANULOCYTES # BLD AUTO: 0.01 10*3/MM3 (ref 0–0.05)
IMM GRANULOCYTES NFR BLD AUTO: 0.1 % (ref 0–0.5)
LYMPHOCYTES # BLD AUTO: 2.24 10*3/MM3 (ref 0.7–3.1)
LYMPHOCYTES NFR BLD AUTO: 27.8 % (ref 19.6–45.3)
MAGNESIUM SERPL-MCNC: 1.8 MG/DL (ref 1.6–2.6)
MCH RBC QN AUTO: 27.8 PG (ref 26.6–33)
MCHC RBC AUTO-ENTMCNC: 33.7 G/DL (ref 31.5–35.7)
MCV RBC AUTO: 82.4 FL (ref 79–97)
MONOCYTES # BLD AUTO: 0.62 10*3/MM3 (ref 0.1–0.9)
MONOCYTES NFR BLD AUTO: 7.7 % (ref 5–12)
NEUTROPHILS NFR BLD AUTO: 5.11 10*3/MM3 (ref 1.7–7)
NEUTROPHILS NFR BLD AUTO: 63.4 % (ref 42.7–76)
NRBC BLD AUTO-RTO: 0 /100 WBC (ref 0–0.2)
PHOSPHATE SERPL-MCNC: 3.6 MG/DL (ref 2.5–4.5)
PLATELET # BLD AUTO: 216 10*3/MM3 (ref 140–450)
PMV BLD AUTO: 10.2 FL (ref 6–12)
POTASSIUM SERPL-SCNC: 3.7 MMOL/L (ref 3.5–5.2)
PROT SERPL-MCNC: 6.7 G/DL (ref 6–8.5)
QT INTERVAL: 428 MS
QT INTERVAL: 440 MS
QTC INTERVAL: 464 MS
QTC INTERVAL: 479 MS
RBC # BLD AUTO: 4.43 10*6/MM3 (ref 4.14–5.8)
SODIUM SERPL-SCNC: 136 MMOL/L (ref 136–145)
WBC NRBC COR # BLD AUTO: 8.06 10*3/MM3 (ref 3.4–10.8)

## 2024-05-28 PROCEDURE — 94799 UNLISTED PULMONARY SVC/PX: CPT

## 2024-05-28 PROCEDURE — 94761 N-INVAS EAR/PLS OXIMETRY MLT: CPT

## 2024-05-28 PROCEDURE — 82948 REAGENT STRIP/BLOOD GLUCOSE: CPT

## 2024-05-28 PROCEDURE — 82948 REAGENT STRIP/BLOOD GLUCOSE: CPT | Performed by: FAMILY MEDICINE

## 2024-05-28 PROCEDURE — 82948 REAGENT STRIP/BLOOD GLUCOSE: CPT | Performed by: STUDENT IN AN ORGANIZED HEALTH CARE EDUCATION/TRAINING PROGRAM

## 2024-05-28 PROCEDURE — 94760 N-INVAS EAR/PLS OXIMETRY 1: CPT

## 2024-05-28 PROCEDURE — 85025 COMPLETE CBC W/AUTO DIFF WBC: CPT | Performed by: STUDENT IN AN ORGANIZED HEALTH CARE EDUCATION/TRAINING PROGRAM

## 2024-05-28 PROCEDURE — 84100 ASSAY OF PHOSPHORUS: CPT | Performed by: STUDENT IN AN ORGANIZED HEALTH CARE EDUCATION/TRAINING PROGRAM

## 2024-05-28 PROCEDURE — 83735 ASSAY OF MAGNESIUM: CPT | Performed by: STUDENT IN AN ORGANIZED HEALTH CARE EDUCATION/TRAINING PROGRAM

## 2024-05-28 PROCEDURE — 80053 COMPREHEN METABOLIC PANEL: CPT | Performed by: STUDENT IN AN ORGANIZED HEALTH CARE EDUCATION/TRAINING PROGRAM

## 2024-05-28 PROCEDURE — 93005 ELECTROCARDIOGRAM TRACING: CPT | Performed by: STUDENT IN AN ORGANIZED HEALTH CARE EDUCATION/TRAINING PROGRAM

## 2024-05-28 PROCEDURE — 94640 AIRWAY INHALATION TREATMENT: CPT

## 2024-05-28 PROCEDURE — 99233 SBSQ HOSP IP/OBS HIGH 50: CPT | Performed by: FAMILY MEDICINE

## 2024-05-28 PROCEDURE — 63710000001 INSULIN LISPRO (HUMAN) PER 5 UNITS: Performed by: FAMILY MEDICINE

## 2024-05-28 RX ORDER — NICOTINE POLACRILEX 4 MG
15 LOZENGE BUCCAL
Status: DISCONTINUED | OUTPATIENT
Start: 2024-05-28 | End: 2024-05-29 | Stop reason: HOSPADM

## 2024-05-28 RX ORDER — GABAPENTIN 300 MG/1
600 CAPSULE ORAL EVERY 12 HOURS SCHEDULED
Status: DISCONTINUED | OUTPATIENT
Start: 2024-05-28 | End: 2024-05-29 | Stop reason: HOSPADM

## 2024-05-28 RX ORDER — INSULIN LISPRO 100 [IU]/ML
2-9 INJECTION, SOLUTION INTRAVENOUS; SUBCUTANEOUS
Status: DISCONTINUED | OUTPATIENT
Start: 2024-05-28 | End: 2024-05-29 | Stop reason: HOSPADM

## 2024-05-28 RX ORDER — DEXTROSE MONOHYDRATE 25 G/50ML
25 INJECTION, SOLUTION INTRAVENOUS
Status: DISCONTINUED | OUTPATIENT
Start: 2024-05-28 | End: 2024-05-29 | Stop reason: HOSPADM

## 2024-05-28 RX ORDER — INSULIN LISPRO 100 [IU]/ML
10 INJECTION, SOLUTION INTRAVENOUS; SUBCUTANEOUS ONCE
Status: COMPLETED | OUTPATIENT
Start: 2024-05-28 | End: 2024-05-28

## 2024-05-28 RX ORDER — IBUPROFEN 600 MG/1
1 TABLET ORAL
Status: DISCONTINUED | OUTPATIENT
Start: 2024-05-28 | End: 2024-05-29 | Stop reason: HOSPADM

## 2024-05-28 RX ORDER — ESCITALOPRAM OXALATE 10 MG/1
20 TABLET ORAL DAILY
Status: DISCONTINUED | OUTPATIENT
Start: 2024-05-28 | End: 2024-05-29 | Stop reason: HOSPADM

## 2024-05-28 RX ORDER — DEXTROSE MONOHYDRATE 25 G/50ML
50 INJECTION, SOLUTION INTRAVENOUS
Status: DISCONTINUED | OUTPATIENT
Start: 2024-05-28 | End: 2024-05-29 | Stop reason: HOSPADM

## 2024-05-28 RX ORDER — ASPIRIN 81 MG/1
81 TABLET ORAL DAILY
Status: DISCONTINUED | OUTPATIENT
Start: 2024-05-28 | End: 2024-05-29 | Stop reason: HOSPADM

## 2024-05-28 RX ADMIN — RIVAROXABAN 20 MG: 20 TABLET, FILM COATED ORAL at 08:22

## 2024-05-28 RX ADMIN — ARFORMOTEROL TARTRATE 15 MCG: 15 SOLUTION RESPIRATORY (INHALATION) at 22:19

## 2024-05-28 RX ADMIN — Medication 10 ML: at 08:23

## 2024-05-28 RX ADMIN — SOTALOL HYDROCHLORIDE 80 MG: 80 TABLET ORAL at 21:20

## 2024-05-28 RX ADMIN — BUDESONIDE 0.5 MG: 0.5 SUSPENSION RESPIRATORY (INHALATION) at 07:02

## 2024-05-28 RX ADMIN — ASPIRIN 81 MG: 81 TABLET, COATED ORAL at 08:22

## 2024-05-28 RX ADMIN — EMPAGLIFLOZIN 25 MG: 10 TABLET, FILM COATED ORAL at 08:22

## 2024-05-28 RX ADMIN — ARFORMOTEROL TARTRATE 15 MCG: 15 SOLUTION RESPIRATORY (INHALATION) at 07:02

## 2024-05-28 RX ADMIN — INSULIN LISPRO 10 UNITS: 100 INJECTION, SOLUTION INTRAVENOUS; SUBCUTANEOUS at 08:23

## 2024-05-28 RX ADMIN — Medication 10 ML: at 21:21

## 2024-05-28 RX ADMIN — MIDODRINE HYDROCHLORIDE 5 MG: 5 TABLET ORAL at 08:22

## 2024-05-28 RX ADMIN — INSULIN LISPRO 2 UNITS: 100 INJECTION, SOLUTION INTRAVENOUS; SUBCUTANEOUS at 17:27

## 2024-05-28 RX ADMIN — CEFDINIR 300 MG: 300 CAPSULE ORAL at 08:22

## 2024-05-28 RX ADMIN — GABAPENTIN 600 MG: 300 CAPSULE ORAL at 21:20

## 2024-05-28 RX ADMIN — ROSUVASTATIN 20 MG: 20 TABLET, FILM COATED ORAL at 21:20

## 2024-05-28 RX ADMIN — INSULIN LISPRO 2 UNITS: 100 INJECTION, SOLUTION INTRAVENOUS; SUBCUTANEOUS at 12:06

## 2024-05-28 RX ADMIN — INSULIN LISPRO 2 UNITS: 100 INJECTION, SOLUTION INTRAVENOUS; SUBCUTANEOUS at 21:20

## 2024-05-28 RX ADMIN — SOTALOL HYDROCHLORIDE 80 MG: 80 TABLET ORAL at 08:22

## 2024-05-28 RX ADMIN — DEXTROSE MONOHYDRATE 50 ML: 25 INJECTION, SOLUTION INTRAVENOUS at 04:15

## 2024-05-28 RX ADMIN — GABAPENTIN 600 MG: 300 CAPSULE ORAL at 08:22

## 2024-05-28 RX ADMIN — MIDODRINE HYDROCHLORIDE 5 MG: 5 TABLET ORAL at 17:27

## 2024-05-28 RX ADMIN — DEXTROSE MONOHYDRATE 100 ML/HR: 100 INJECTION, SOLUTION INTRAVENOUS at 05:18

## 2024-05-28 RX ADMIN — ESCITALOPRAM OXALATE 20 MG: 10 TABLET ORAL at 08:22

## 2024-05-28 RX ADMIN — MIDODRINE HYDROCHLORIDE 5 MG: 5 TABLET ORAL at 12:06

## 2024-05-28 NOTE — SIGNIFICANT NOTE
Wound Eval / Progress Noted    [facility]     Patient Name: Fantasma Mcintosh  : 1965  MRN: [id number]  Today's Date: 2024                 Admit Date: 2024    Visit Dx:    ICD-10-CM ICD-9-CM   1. Hypoglycemia due to insulin  E16.0 251.1    T38.3X5A          Hypoglycemia        Past Medical History:   Diagnosis Date    Diabetes mellitus     Disease of thyroid gland         History reviewed. No pertinent surgical history.      Physical Assessment:  Wound 02/10/24 0900 Right anterior hand (Active)   Wound Image   24   Dressing Appearance intact;dry 24 1100   Closure None 24 1100   Base yellow;moist 24 1100   Yellow (%), Wound Tissue Color 100 24 1100   Periwound intact;dry;redness 24 1100   Periwound Temperature warm 24 1100   Periwound Skin Turgor soft 24 1100   Edges open 24 1100   Wound Length (cm) 1.1 cm 24 1100   Wound Width (cm) 0.8 cm 24 1100   Wound Depth (cm) 0.1 cm 24 1100   Wound Surface Area (cm^2) 0.88 cm^2 24 1100   Wound Volume (cm^3) 0.088 cm^3 24 1100   Drainage Amount none 24 1100   Care, Wound cleansed with;sterile normal saline 24 1100   Dressing Care dressing applied;silver impregnated;hydrofiber;elastic bandage 24 1100   Periwound Care absorptive dressing applied 24 1100       Wound 24 0130 lower sternal (Active)   Wound Image   24   Pressure Injury Stage MMPI 24 2200   Dressing Appearance intact;dry 24 1100   Closure None 24 1100   Base red;moist 24 1100   Red (%), Wound Tissue Color 100 24 1100   Periwound intact;dry;pink 24 1100   Periwound Temperature warm 24 1100   Periwound Skin Turgor soft 24 1100   Edges open 24 1100   Wound Length (cm) 0.9 cm 05/28/24 1100   Wound Width (cm) 0.3 cm 24   Wound Depth (cm) 0.3 cm 24   Wound Surface Area (cm^2) 0.27 cm^2 24   Wound  Volume (cm^3) 0.081 cm^3 05/28/24 1100   Drainage Characteristics/Odor serosanguineous 05/28/24 1100   Drainage Amount scant 05/28/24 1100   Care, Wound cleansed with;sterile normal saline 05/28/24 1100   Dressing Care dressing applied;packed with;silver impregnated;hydrofiber;silicone;border dressing 05/28/24 1100   Periwound Care absorptive dressing applied 05/28/24 1100       Wound 05/23/24 0130 Left cervical spine (Active)   Wound Image   05/27/24 2118   Dressing Appearance open to air 05/28/24 1100   Closure None 05/28/24 1100   Base red;dry 05/28/24 1100   Periwound intact;dry;indurated;redness 05/28/24 1100   Periwound Temperature warm 05/28/24 1100   Periwound Skin Turgor firm 05/28/24 1100   Edges rolled/closed 05/28/24 1100   Drainage Amount none 05/28/24 1100   Care, Wound cleansed with;sterile normal saline 05/28/24 1100   Dressing Care open to air 05/28/24 1100       Wound 05/27/24 2122 coccyx (Active)   Wound Image   05/27/24 2122   Pressure Injury Stage O 05/28/24 1100   Dressing Appearance open to air 05/28/24 1100   Closure None 05/28/24 1100   Base blanchable;pink;moist 05/28/24 1100   Periwound intact;pink;dry 05/28/24 1100   Periwound Temperature warm 05/28/24 1100   Periwound Skin Turgor soft 05/28/24 1100   Edges rolled/closed 05/28/24 1100   Drainage Amount none 05/28/24 1100   Care, Wound cleansed with;sterile normal saline 05/28/24 1100   Dressing Care open to air 05/28/24 1100   Periwound Care barrier ointment applied 05/28/24 1100      Wound Check / Follow-up: Patient seen today for wound consult.  Patient is awake alert and oriented at time of assessment.      Patient reports having blackhead removal to the anterior chest wall approximately 2 weeks ago; patient reports applying topical treatment to the wound base, unsure name of topical treatment.  Linear incision present with red moist wound base.  Minimal depth measured.  Periwound is pink and soft.  Cleansed with normal saline and  gauze.  Recommend to fill the wound with a strip of silver impregnated Hydrofiber daily and secured with a silicone border dressing.    Patient with traumatic injury to the right index and middle finger; patient reports burning fingers.  Wound bases are yellow and moist with minimal depth noted.  Periwound is reddened.  Cleansed with normal saline.  Recommend daily application of silver impregnated Hydrofiber to the wound bases and secured with Band-Aids.    Scattered scabs noted to the bilateral legs and feet, no visible wound base is present at this time.  Will recommend to provide quality skin care and hygiene with application of purple top moisturizer.    Tear to gluteal crease present with pink moist blanchable tissue, superficial loss noted.  No pressure indicated.  Periwound is intact and dry.  Cleanse with normal saline.  Recommend implement every 2 hours turns and offload at all times.  Apply blue top moisture barrier to the wound base and leave open to air.    Posterior neck, left side with blackhead.  Periwound is indurated and reddened.  No open or draining tissue present at this time.  Patient reports that he is to be seen with his primary caregiver within the next couple weeks to have removed.    Impression: incision to chest wall, scattered scabs, traumatic injury to right pointer and middle finger, tear to gluteal crease    Short term goals:  regain skin integrity, skin protection, topical treatment, pressure reduction, moisture prevention, daily dressing changes, skin care / hygiene    Sammi Gamble RN    5/28/2024    17:19 EDT

## 2024-05-28 NOTE — PLAN OF CARE
Goal Outcome Evaluation:         Pt has been resting in the bed throughout the shift with family at the bedside off and on. Pt has had no complaints of pain. VSS. Pt just got a bed on 4MT[facility] room 420. No new orders at this time. No signs of acute distress noted. Call light within reach. Plan of care ongoing.        Staci Best RN

## 2024-05-28 NOTE — PROGRESS NOTES
[facility]   Hospitalist Progress Note  Date: 2024  Patient Name: Fantasma Mcintosh  : 1965  MRN: [id number]  Date of admission: 2024      Subjective   Subjective     Chief complaint: Drowsiness, hyperglycemia    Summary:  59-year-old male with history of paroxysmal atrial fibrillation status post cardioversion, long-term anticoagulation with Xarelto, hypothyroidism, insulin-dependent diabetes mellitus on an insulin pump, COPD, hospitalized on 2024 for chief complaint of drowsiness and hypoglycemia, blood sugars down to the 17 range, required continuous D10 infusion, associated vomiting, was hypothermic, placed in the critical care unit for monitoring, blood sugar stabilized, transitioned out of critical care unit    Interval follow-up: Seen and examined this morning, no acute distress, no acute major night events, blood sugars are stable, off D10, appetite improving, notes he follows with endocrinology that manages his insulin pump.  No fevers, chills, sweats.  Blood pressures are adequately stable, some soft blood pressures but overall better.  90s over 60s, telemetry reviewed, no acute major events    Review of systems:  All systems reviewed and negative except for weakness and fatigue    Objective   Objective     Vitals:   Temp:  [94.4 °F (34.7 °C)-98.8 °F (37.1 °C)] 97.7 °F (36.5 °C)  Heart Rate:  [60-78] 66  Resp:  [11-22] 11  BP: ()/() 98/65  Physical Exam               Constitutional: Awake, alert, no acute distress, pleasant mood              Eyes: Pupils equal, sclerae anicteric, no conjunctival injection              HENT: NCAT, mucous membranes moist              Respiratory: Clear to auscultation bilaterally, nonlabored respirations               Cardiovascular: RRR, no murmurs, rubs, or gallops, palpable pedal pulses bilaterally              Gastrointestinal: Positive bowel sounds, soft, nontender, nondistended              Musculoskeletal: No bilateral ankle edema, no  clubbing or cyanosis to extremities              Neurologic: Oriented x 3, speech clear, full strength              Skin: No rashes       Result Review    Result Review:  I have personally reviewed the pertinent results from the past 24 hours to 5/28/2024 12:40 EDT and agree with these findings:  [x]  Laboratory   CBC          5/25/2024    05:09 5/27/2024    15:59 5/28/2024    02:51   CBC   WBC 5.32  7.75  8.06    RBC 4.27  5.43  4.43    Hemoglobin 11.9  14.9  12.3    Hematocrit 35.4  45.9  36.5    MCV 82.9  84.5  82.4    MCH 27.9  27.4  27.8    MCHC 33.6  32.5  33.7    RDW 15.8  15.7  15.5    Platelets 131  245  216      BMP          5/25/2024    05:09 5/27/2024    15:59 5/28/2024    02:51   BMP   BUN 22  22  24    Creatinine 0.87  0.83  0.77    Sodium 136  141  136    Potassium 4.2  3.6  3.7    Chloride 100  100  99    CO2 26.8  30.8  27.0    Calcium 8.3  9.4  8.4      LIVER FUNCTION TESTS:      Lab 05/28/24  0251 05/27/24  1559 05/22/24  1737   TOTAL PROTEIN 6.7 7.9 8.0   ALBUMIN 3.5 4.1 4.0   GLOBULIN 3.2 3.8 4.0   ALT (SGPT) 35 48* 55*   AST (SGOT) 24 34 33   BILIRUBIN 0.4 0.5 0.4   ALK PHOS 73 93 120*   LIPASE  --  12*  --        [x]  Microbiology   Microbiology Results (last 10 days)       Procedure Component Value - Date/Time    Respiratory Culture - Sputum, Cough [[id number]] Collected: 05/23/24 1148    Lab Status: Final result Specimen: Sputum from Cough Updated: 05/25/24 1034     Respiratory Culture Scant growth (1+) Normal respiratory apple. No S. aureus or Pseudomonas aeruginosa detected. Final report.     Gram Stain Moderate (3+) WBCs seen      Few (2+) Gram positive cocci in pairs, chains and clusters      Rare (1+) Epithelial cells seen    S. Pneumo Ag Urine or CSF - Urine, Urine, Clean Catch [[id number]]  (Normal) Collected: 05/23/24 1146    Lab Status: Final result Specimen: Urine, Clean Catch Updated: 05/23/24 1212     Strep Pneumo Ag Negative    Legionella Antigen, Urine - Urine, Urine, Clean Catch  [[id number]]  (Normal) Collected: 05/23/24 1146    Lab Status: Final result Specimen: Urine, Clean Catch Updated: 05/23/24 1212     LEGIONELLA ANTIGEN, URINE Negative    Respiratory Panel PCR w/COVID-19(SARS-CoV-2) RICHARD/HEIDE/MAY/PAD/COR/DIONISIO In-House, NP Swab in UTM/VTM, 2 HR TAT - Swab, Nasopharynx [[id number]]  (Normal) Collected: 05/23/24 1110    Lab Status: Final result Specimen: Swab from Nasopharynx Updated: 05/23/24 1217     ADENOVIRUS, PCR Not Detected     Coronavirus 229E Not Detected     Coronavirus HKU1 Not Detected     Coronavirus NL63 Not Detected     Coronavirus OC43 Not Detected     COVID19 Not Detected     Human Metapneumovirus Not Detected     Human Rhinovirus/Enterovirus Not Detected     Influenza A PCR Not Detected     Influenza B PCR Not Detected     Parainfluenza Virus 1 Not Detected     Parainfluenza Virus 2 Not Detected     Parainfluenza Virus 3 Not Detected     Parainfluenza Virus 4 Not Detected     RSV, PCR Not Detected     Bordetella pertussis pcr Not Detected     Bordetella parapertussis PCR Not Detected     Chlamydophila pneumoniae PCR Not Detected     Mycoplasma pneumo by PCR Not Detected    Narrative:      In the setting of a positive respiratory panel with a viral infection PLUS a negative procalcitonin without other underlying concern for bacterial infection, consider observing off antibiotics or discontinuation of antibiotics and continue supportive care. If the respiratory panel is positive for atypical bacterial infection (Bordetella pertussis, Chlamydophila pneumoniae, or Mycoplasma pneumoniae), consider antibiotic de-escalation to target atypical bacterial infection.    Blood Culture - Blood, Hand, Left [[id number]]  (Normal) Collected: 05/22/24 2104    Lab Status: Final result Specimen: Blood from Hand, Left Updated: 05/27/24 2116     Blood Culture No growth at 5 days    Blood Culture - Blood, Hand, Right [[id number]]  (Normal) Collected: 05/22/24 2104    Lab Status: Final result  Specimen: Blood from Hand, Right Updated: 05/27/24 2116     Blood Culture No growth at 5 days    COVID-19, FLU A/B, RSV PCR 1 HR TAT - Swab, Nasopharynx [[id number]]  (Normal) Collected: 05/22/24 2006    Lab Status: Final result Specimen: Swab from Nasopharynx Updated: 05/22/24 2048     COVID19 Not Detected     Influenza A PCR Not Detected     Influenza B PCR Not Detected     RSV, PCR Not Detected    Narrative:      Fact sheet for providers: https://www.fda.gov/media/655383/download    Fact sheet for patients: https://www.fda.gov/media/309006/download    Test performed by PCR.              [x]  Radiology CT Head Without Contrast    Result Date: 5/22/2024  Impression:  1. No acute intracranial abnormality.    Electronically Signed By-Evin Black MD On:5/22/2024 7:54 PM      XR Chest 1 View    Result Date: 5/22/2024  Impression:  1. No acute cardiopulmonary disease.   Electronically Signed By-Evin Black MD On:5/22/2024 5:53 PM         [x]  EKG/Telemetry   ECG 12 Lead QT Measurement   Preliminary Result   HEART RATE= 70  bpm   RR Interval= 852  ms   NM Interval= 151  ms   P Horizontal Axis=   deg   P Front Axis= 34  deg   QRSD Interval= 109  ms   QT Interval= 428  ms   QTcB= 464  ms   QRS Axis= -55  deg   T Wave Axis= 50  deg   - ABNORMAL ECG -   Sinus rhythm   Incomplete left bundle branch block   Low voltage, precordial leads   Consider anterior infarct   Electronically Signed By:    Date and Time of Study: 2024-05-28 00:28:12      ECG 12 Lead QT Measurement   Preliminary Result   HEART RATE= 73  bpm   RR Interval= 844  ms   NM Interval= 173  ms   P Horizontal Axis= -18  deg   P Front Axis= 72  deg   QRSD Interval= 111  ms   QT Interval= 440  ms   QTcB= 479  ms   QRS Axis= -57  deg   T Wave Axis= 95  deg   - ABNORMAL ECG -   Sinus rhythm   Ventricular premature complex   Inferior infarct, old   Consider anterior infarct   Nonspecific T abnormalities, lateral leads   Electronically Signed By:    Date and Time of  Study: 2024-05-27 21:59:56          []  Cardiology/Vascular   []  Pathology  [x]  Old records  []  Other:    Assessment & Plan   Assessment / Plan     Assessment/Plan:    Assessment:  Hypoglycemia from poor oral intake and being on insulin pump  Recently diagnosed pneumonia, currently on cefdinir  Hypothyroidism  A-fib on Xarelto  Insulin-dependent diabetes mellitus  COPD  HFpEF, not in exacerbation    Plan:  Labs and imaging reviewed  Start insulin sliding scale coverage  Resume cardiac diabetic heart healthy diet with carb consistency  Transfer out of ICU to monitored floor  Hold insulin pump use  Continue ProAmatine 5 mg 3 times daily  Continue Brovana and Pulmicort nebs twice daily  Continue Jardiance 25 mg daily  Continue Lexapro 20 mg daily  Continue Neurontin 600 mg twice a day  Continue Xarelto 20 mg daily  Continue Crestor 20 mg daily  Continue sotalol 80 mg twice a day  A.m. labs  Full code  DVT prophylaxis with Xarelto  Clinical course to dictate further management  Discussed with nurse at the bedside    DVT prophylaxis:  Medical DVT prophylaxis orders are present.        CODE STATUS:   Level Of Support Discussed With: Patient  Code Status (Patient has no pulse and is not breathing): CPR (Attempt to Resuscitate)  Medical Interventions (Patient has pulse or is breathing): Full Support        Electronically signed by Cody Lopez MD, 5/28/2024, 12:40 EDT.    Portions of this documentation were transcribed electronically from a voice recognition software.  I confirm all data accurately represents the service(s) I performed at today's visit.

## 2024-05-28 NOTE — OUTREACH NOTE
Medical Week 1 Survey      Flowsheet Row Responses   [facility] patient discharged from? [facility]   Does the patient have one of the following disease processes/diagnoses(primary or secondary)? Other   Week 1 attempt successful? No   Unsuccessful attempts Attempt 1   Revoke Readmitted            Leonila WOLFE - Registered Nurse

## 2024-05-29 ENCOUNTER — READMISSION MANAGEMENT (OUTPATIENT)
Dept: CALL CENTER | Facility: HOSPITAL | Age: 59
End: 2024-05-29
Payer: MEDICARE

## 2024-05-29 VITALS
HEIGHT: 76 IN | BODY MASS INDEX: 23.71 KG/M2 | OXYGEN SATURATION: 95 % | HEART RATE: 58 BPM | TEMPERATURE: 97.9 F | DIASTOLIC BLOOD PRESSURE: 67 MMHG | SYSTOLIC BLOOD PRESSURE: 99 MMHG | RESPIRATION RATE: 18 BRPM | WEIGHT: 194.67 LBS

## 2024-05-29 PROBLEM — E11.42 DIABETIC PERIPHERAL NEUROPATHY: Status: ACTIVE | Noted: 2019-08-08

## 2024-05-29 PROBLEM — E16.2 HYPOGLYCEMIA: Status: RESOLVED | Noted: 2024-05-27 | Resolved: 2024-05-29

## 2024-05-29 LAB
ALBUMIN SERPL-MCNC: 3.5 G/DL (ref 3.5–5.2)
ALP SERPL-CCNC: 83 U/L (ref 39–117)
ALT SERPL W P-5'-P-CCNC: 57 U/L (ref 1–41)
ANION GAP SERPL CALCULATED.3IONS-SCNC: 9.6 MMOL/L (ref 5–15)
AST SERPL-CCNC: 59 U/L (ref 1–40)
BASOPHILS # BLD AUTO: 0.12 10*3/MM3 (ref 0–0.2)
BASOPHILS NFR BLD AUTO: 1.3 % (ref 0–1.5)
BILIRUB CONJ SERPL-MCNC: <0.2 MG/DL (ref 0–0.3)
BILIRUB INDIRECT SERPL-MCNC: ABNORMAL MG/DL
BILIRUB SERPL-MCNC: 0.4 MG/DL (ref 0–1.2)
BUN SERPL-MCNC: 19 MG/DL (ref 6–20)
BUN/CREAT SERPL: 21.8 (ref 7–25)
CALCIUM SPEC-SCNC: 8.8 MG/DL (ref 8.6–10.5)
CHLORIDE SERPL-SCNC: 99 MMOL/L (ref 98–107)
CO2 SERPL-SCNC: 26.4 MMOL/L (ref 22–29)
CREAT SERPL-MCNC: 0.87 MG/DL (ref 0.76–1.27)
DEPRECATED RDW RBC AUTO: 48.1 FL (ref 37–54)
EGFRCR SERPLBLD CKD-EPI 2021: 100 ML/MIN/1.73
EOSINOPHIL # BLD AUTO: 0.24 10*3/MM3 (ref 0–0.4)
EOSINOPHIL NFR BLD AUTO: 2.5 % (ref 0.3–6.2)
ERYTHROCYTE [DISTWIDTH] IN BLOOD BY AUTOMATED COUNT: 15.6 % (ref 12.3–15.4)
GLUCOSE BLDC GLUCOMTR-MCNC: 122 MG/DL (ref 70–99)
GLUCOSE BLDC GLUCOMTR-MCNC: 148 MG/DL (ref 70–99)
GLUCOSE BLDC GLUCOMTR-MCNC: 159 MG/DL (ref 70–99)
GLUCOSE BLDC GLUCOMTR-MCNC: 168 MG/DL (ref 70–99)
GLUCOSE SERPL-MCNC: 152 MG/DL (ref 65–99)
HCT VFR BLD AUTO: 37.6 % (ref 37.5–51)
HGB BLD-MCNC: 12.2 G/DL (ref 13–17.7)
IMM GRANULOCYTES # BLD AUTO: 0.04 10*3/MM3 (ref 0–0.05)
IMM GRANULOCYTES NFR BLD AUTO: 0.4 % (ref 0–0.5)
LYMPHOCYTES # BLD AUTO: 2.99 10*3/MM3 (ref 0.7–3.1)
LYMPHOCYTES NFR BLD AUTO: 31.1 % (ref 19.6–45.3)
MAGNESIUM SERPL-MCNC: 2.3 MG/DL (ref 1.6–2.6)
MCH RBC QN AUTO: 27.4 PG (ref 26.6–33)
MCHC RBC AUTO-ENTMCNC: 32.4 G/DL (ref 31.5–35.7)
MCV RBC AUTO: 84.5 FL (ref 79–97)
MONOCYTES # BLD AUTO: 0.73 10*3/MM3 (ref 0.1–0.9)
MONOCYTES NFR BLD AUTO: 7.6 % (ref 5–12)
NEUTROPHILS NFR BLD AUTO: 5.48 10*3/MM3 (ref 1.7–7)
NEUTROPHILS NFR BLD AUTO: 57.1 % (ref 42.7–76)
NRBC BLD AUTO-RTO: 0 /100 WBC (ref 0–0.2)
PHOSPHATE SERPL-MCNC: 3.3 MG/DL (ref 2.5–4.5)
PLATELET # BLD AUTO: 222 10*3/MM3 (ref 140–450)
PMV BLD AUTO: 9.9 FL (ref 6–12)
POTASSIUM SERPL-SCNC: 4.3 MMOL/L (ref 3.5–5.2)
PROT SERPL-MCNC: 6.4 G/DL (ref 6–8.5)
QT INTERVAL: 452 MS
QTC INTERVAL: 441 MS
RBC # BLD AUTO: 4.45 10*6/MM3 (ref 4.14–5.8)
SODIUM SERPL-SCNC: 135 MMOL/L (ref 136–145)
WBC NRBC COR # BLD AUTO: 9.6 10*3/MM3 (ref 3.4–10.8)

## 2024-05-29 PROCEDURE — 94799 UNLISTED PULMONARY SVC/PX: CPT

## 2024-05-29 PROCEDURE — 94664 DEMO&/EVAL PT USE INHALER: CPT

## 2024-05-29 PROCEDURE — 82948 REAGENT STRIP/BLOOD GLUCOSE: CPT

## 2024-05-29 PROCEDURE — 83735 ASSAY OF MAGNESIUM: CPT | Performed by: FAMILY MEDICINE

## 2024-05-29 PROCEDURE — 82948 REAGENT STRIP/BLOOD GLUCOSE: CPT | Performed by: FAMILY MEDICINE

## 2024-05-29 PROCEDURE — 84100 ASSAY OF PHOSPHORUS: CPT | Performed by: FAMILY MEDICINE

## 2024-05-29 PROCEDURE — 80048 BASIC METABOLIC PNL TOTAL CA: CPT | Performed by: FAMILY MEDICINE

## 2024-05-29 PROCEDURE — 99239 HOSP IP/OBS DSCHRG MGMT >30: CPT | Performed by: FAMILY MEDICINE

## 2024-05-29 PROCEDURE — 93005 ELECTROCARDIOGRAM TRACING: CPT | Performed by: FAMILY MEDICINE

## 2024-05-29 PROCEDURE — 63710000001 INSULIN LISPRO (HUMAN) PER 5 UNITS: Performed by: FAMILY MEDICINE

## 2024-05-29 PROCEDURE — 85025 COMPLETE CBC W/AUTO DIFF WBC: CPT | Performed by: FAMILY MEDICINE

## 2024-05-29 PROCEDURE — 97161 PT EVAL LOW COMPLEX 20 MIN: CPT

## 2024-05-29 PROCEDURE — 80076 HEPATIC FUNCTION PANEL: CPT | Performed by: FAMILY MEDICINE

## 2024-05-29 RX ORDER — CALCIUM CARB/VITAMIN D3/VIT K1 500-100-40
1 TABLET,CHEWABLE ORAL 3 TIMES DAILY
Qty: 100 EACH | Refills: 0 | Status: SHIPPED | OUTPATIENT
Start: 2024-05-29

## 2024-05-29 RX ORDER — INSULIN LISPRO 100 [IU]/ML
4 INJECTION, SOLUTION INTRAVENOUS; SUBCUTANEOUS
Qty: 3 ML | Refills: 0 | Status: SHIPPED | OUTPATIENT
Start: 2024-05-29 | End: 2024-06-23

## 2024-05-29 RX ADMIN — Medication 10 ML: at 08:58

## 2024-05-29 RX ADMIN — INSULIN LISPRO 2 UNITS: 100 INJECTION, SOLUTION INTRAVENOUS; SUBCUTANEOUS at 12:11

## 2024-05-29 RX ADMIN — ARFORMOTEROL TARTRATE 15 MCG: 15 SOLUTION RESPIRATORY (INHALATION) at 09:47

## 2024-05-29 RX ADMIN — MIDODRINE HYDROCHLORIDE 5 MG: 5 TABLET ORAL at 12:11

## 2024-05-29 RX ADMIN — RIVAROXABAN 20 MG: 20 TABLET, FILM COATED ORAL at 08:57

## 2024-05-29 RX ADMIN — GABAPENTIN 600 MG: 300 CAPSULE ORAL at 08:57

## 2024-05-29 RX ADMIN — SOTALOL HYDROCHLORIDE 80 MG: 80 TABLET ORAL at 08:55

## 2024-05-29 RX ADMIN — MIDODRINE HYDROCHLORIDE 5 MG: 5 TABLET ORAL at 07:56

## 2024-05-29 RX ADMIN — EMPAGLIFLOZIN 25 MG: 10 TABLET, FILM COATED ORAL at 08:57

## 2024-05-29 RX ADMIN — ASPIRIN 81 MG: 81 TABLET, COATED ORAL at 08:57

## 2024-05-29 RX ADMIN — ESCITALOPRAM OXALATE 20 MG: 10 TABLET ORAL at 08:57

## 2024-05-29 RX ADMIN — INSULIN LISPRO 2 UNITS: 100 INJECTION, SOLUTION INTRAVENOUS; SUBCUTANEOUS at 08:55

## 2024-05-29 RX ADMIN — BUDESONIDE 0.5 MG: 0.5 SUSPENSION RESPIRATORY (INHALATION) at 09:47

## 2024-05-29 NOTE — DISCHARGE INSTR - APPOINTMENTS
[facility] contact you for home visit. Their number is [phone] if you have questions.

## 2024-05-29 NOTE — PLAN OF CARE
Goal Outcome Evaluation:  Plan of Care Reviewed With: patient           Outcome Evaluation: Patient is able to complete all transfers and bed mobilities indepedently in the room. Pt is able to ambulate ad charan in the room. Pt does not need inpatient PT services. Recommend DC home.      Anticipated Discharge Disposition (PT): home

## 2024-05-29 NOTE — CONSULTS
"Met with patient at bedside. He is unsure what caused his low blood sugar before he was admitted to the hospital. Patient treats his low blood sugars at home with orange juice or \"little cakes.\" He states he picked up all his diabetes supplies when he was discharged last week. He also states he has no issues using his insulin pump. No questions or needs at this time.   "

## 2024-05-29 NOTE — THERAPY EVALUATION
Acute Care - Physical Therapy Initial Evaluation   [facility]     Patient Name: Fantasma Mcintosh  : 1965  MRN: [id number]  Today's Date: 2024      Visit Dx:     ICD-10-CM ICD-9-CM   1. Hypoglycemia due to insulin  E16.0 251.1    T38.3X5A    2. Difficulty walking  R26.2 719.7     Patient Active Problem List   Diagnosis    Atrial fibrillation with RVR    Influenza A    Hypotension    Acute febrile illness    Hyperglycemia    Uncontrolled diabetes mellitus with hyperglycemia    Generalized weakness    Frequent falls    Diabetic peripheral neuropathy    Hyperglycemia due to type 2 diabetes mellitus    Hyperlipidemia     Past Medical History:   Diagnosis Date    Diabetes mellitus     Disease of thyroid gland      History reviewed. No pertinent surgical history.  PT Assessment (Last 12 Hours)       PT Evaluation and Treatment       Row Name 24 0900          Physical Therapy Time and Intention    Subjective Information no complaints  -DP     Document Type evaluation  -DP     Mode of Treatment individual therapy;physical therapy  -DP     Patient Effort good  -DP       Row Name 24 0983          General Information    Patient Profile Reviewed yes  -DP     Patient Observations alert;cooperative;agree to therapy  -DP     Prior Level of Function independent:;gait;transfer;bed mobility;ADL's  -DP     Equipment Currently Used at Home none  -DP     Existing Precautions/Restrictions no known precautions/restrictions  -DP     Barriers to Rehab none identified  -DP       Row Name 24 0959          Living Environment    Current Living Arrangements home  -DP     People in Home significant other  -DP     Primary Care Provided by self  -DP       Row Name 24 0995          Cognition    Orientation Status (Cognition) oriented x 3  -DP       Row Name 24 0967          Range of Motion Comprehensive    General Range of Motion bilateral lower extremity ROM WFL  -DP       Row Name 24 0928          Strength  (Manual Muscle Testing)    Strength (Manual Muscle Testing) bilateral lower extremities;strength is WFL  -DP       Row Name 05/29/24 0959          Bed Mobility    Bed Mobility supine-sit-supine  -DP     Supine-Sit-Supine [place] (Bed Mobility) supervision  -DP       Row Name 05/29/24 0959          Transfers    Transfers sit-stand transfer  -DP       Row Name 05/29/24 0959          Sit-Stand Transfer    Sit-Stand [place] (Transfers) supervision  -DP       Row Name 05/29/24 0959          Gait/Stairs (Locomotion)    Gait/Stairs Locomotion gait/ambulation independence  -DP     [place] Level (Gait) standby assist  -DP     Assistive Device (Gait) other (see comments)  none  -DP     Patient was able to Ambulate yes  -DP     Distance in Feet (Gait) 200  -DP       Row Name 05/29/24 0959          Balance    Balance Assessment standing dynamic balance  -DP     Dynamic Standing Balance supervision  -DP       Row Name             Wound 05/23/24 0130 lower sternal    Wound - Properties Group Placement Date: 05/23/24  -KW Placement Time: 0130  -KW Present on Original Admission: Y  -KW Orientation: lower  -KW Location: sternal  -KW    Retired Wound - Properties Group Placement Date: 05/23/24  -KW Placement Time: 0130  -KW Present on Original Admission: Y  -KW Orientation: lower  -KW Location: sternal  -KW    Retired Wound - Properties Group Date first assessed: 05/23/24  -KW Time first assessed: 0130  -KW Present on Original Admission: Y  -KW Location: sternal  -KW      Row Name             Wound 02/10/24 0900 Right anterior hand    Wound - Properties Group Placement Date: 02/10/24  -DB Placement Time: 0900  -DB Side: Right  -DB Orientation: anterior  -DB, figer tips  Location: hand  -DB    Retired Wound - Properties Group Placement Date: 02/10/24  -DB Placement Time: 0900  -DB Side: Right  -DB Orientation: anterior  -DB, figer tips  Location: hand  -DB    Retired Wound - Properties Group Date first assessed:  02/10/24  -DB Time first assessed: 0900  -DB Side: Right  -DB Location: hand  -DB      Row Name             Wound 05/23/24 0130 Left cervical spine    Wound - Properties Group Placement Date: 05/23/24  -KW Placement Time: 0130  -KW Present on Original Admission: Y  -KW Side: Left  -KW Location: cervical spine  -KW    Retired Wound - Properties Group Placement Date: 05/23/24  -KW Placement Time: 0130  -KW Present on Original Admission: Y  -KW Side: Left  -KW Location: cervical spine  -KW    Retired Wound - Properties Group Date first assessed: 05/23/24  -KW Time first assessed: 0130  -KW Present on Original Admission: Y  -KW Side: Left  -KW Location: cervical spine  -KW      Row Name             Wound 05/27/24 2122 coccyx    Wound - Properties Group Placement Date: 05/27/24  -HP Placement Time: 2122 -HP Location: coccyx  -HP    Retired Wound - Properties Group Placement Date: 05/27/24  -HP Placement Time: 2122 -HP Location: coccyx  -HP    Retired Wound - Properties Group Date first assessed: 05/27/24  -HP Time first assessed: 2122 -HP Location: coccyx  -HP      Row Name 05/29/24 0959          Plan of Care Review    Plan of Care Reviewed With patient  -DP     Outcome Evaluation Patient is able to complete all transfers and bed mobilities indepedently in the room. Pt is able to ambulate ad charan in the room. Pt does not need inpatient PT services. Recommend DC home.  -DP       Row Name 05/29/24 0959          Therapy Assessment/Plan (PT)    Criteria for Skilled Interventions Met (PT) no problems identified which require skilled intervention  -DP     Therapy Frequency (PT) evaluation only  -DP       Row Name 05/29/24 0959          PT Evaluation Complexity    History, PT Evaluation Complexity no personal factors and/or comorbidities  -DP     Examination of Body Systems (PT Eval Complexity) total of 4 or more elements  -DP     Clinical Presentation (PT Evaluation Complexity) stable  -DP     Clinical Decision Making (PT  Evaluation Complexity) low complexity  -DP     Overall Complexity (PT Evaluation Complexity) low complexity  -DP       Row Name 05/29/24 0959          Physical Therapy Goals    Problem Specific Goal Selection (PT) problem specific goal 1, PT  -DP       Row Name 05/29/24 0959          Problem Specific Goal 1 (PT)    Problem Specific Goal 1 (PT) Complete PT evaluation.  -DP     Time Frame (Problem Specific Goal 1, PT) 1 day  -DP     Progress/Outcome (Problem Specific Goal 1, PT) goal met  -DP               User Key  (r) = Recorded By, (t) = Taken By, (c) = Cosigned By      Initials Name Provider Type    KW Daphnie Garcia, RN Registered Nurse    Oliverio Nunes, PT Physical Therapist    Nayeli Mcelroy, RN Registered Nurse    Abby Thomas RN Registered Nurse                      PT Recommendation and Plan  Anticipated Discharge Disposition (PT): home  Therapy Frequency (PT): evaluation only  Plan of Care Reviewed With: patient  Outcome Evaluation: Patient is able to complete all transfers and bed mobilities indepedently in the room. Pt is able to ambulate ad charan in the room. Pt does not need inpatient PT services. Recommend DC home.   Outcome Measures       Row Name 05/29/24 1000             How much help from another person do you currently need...    Turning from your back to your side while in flat bed without using bedrails? 4  -DP      Moving from lying on back to sitting on the side of a flat bed without bedrails? 4  -DP      Moving to and from a bed to a chair (including a wheelchair)? 4  -DP      Standing up from a chair using your arms (e.g., wheelchair, bedside chair)? 4  -DP      Climbing 3-5 steps with a railing? 4  -DP      To walk in hospital room? 4  -DP      AM-PAC 6 Clicks Score (PT) 24  -DP      Highest Level of Mobility Goal 8 --> Walked 250 feet or more  -DP         Functional Assessment    Outcome Measure Options AM-PAC 6 Clicks Basic Mobility (PT)  -DP                User Key  (r) =  Recorded By, (t) = Taken By, (c) = Cosigned By      Initials Name Provider Type    Oliverio Nunes, PT Physical Therapist                     Time Calculation:    PT Charges       Row Name 05/29/24 1001             Time Calculation    PT Received On 05/29/24  -DP         Untimed Charges    PT Eval/Re-eval Minutes 30  -DP         Total Minutes    Untimed Charges Total Minutes 30  -DP       Total Minutes 30  -DP                User Key  (r) = Recorded By, (t) = Taken By, (c) = Cosigned By      Initials Name Provider Type    Oliverio Nunes, PT Physical Therapist                      PT G-Codes  Outcome Measure Options: AM-PAC 6 Clicks Basic Mobility (PT)  AM-PAC 6 Clicks Score (PT): 24    Oliverio Baez, DARY  5/29/2024

## 2024-05-29 NOTE — DISCHARGE SUMMARY
[facility]         HOSPITALIST  DISCHARGE SUMMARY    Patient Name: Fantasma Mcintosh  : 1965  MRN: [id number]    Date of Admission: 2024  Date of Discharge:  2024    Primary Care Physician: Anish Alanis MD    Consults       No orders found from 2024 to 2024.            Active and Resolved Hospital Problems:    Hypoglycemia from poor oral intake and being on insulin pump  Recently diagnosed pneumonia, currently on cefdinir  Hypothyroidism  A-fib on Xarelto  Insulin-dependent diabetes mellitus  COPD  HFpEF, not in exacerbation      Active Hospital Problems   No active problems to display.      Resolved Hospital Problems    Diagnosis POA    **Hypoglycemia [E16.2] Yes       Hospital Course     Hospital Course:  59-year-old male with history of paroxysmal atrial fibrillation status post cardioversion, long-term anticoagulation with Xarelto, hypothyroidism, insulin-dependent diabetes mellitus on an insulin pump, COPD, hospitalized on 2024 for chief complaint of drowsiness and hypoglycemia, blood sugars down to the 17 range, required continuous D10 infusion, associated vomiting, was hypothermic, placed in the critical care unit for monitoring, blood sugar stabilized, transitioned out of critical care unit.  Blood sugars are stable, indicate he had no issue using his pump, as a precaution if his pump does fail, I prescribed lispro insulin, he does not use lispro insulin with his insulin pump, discharged in hemodynamically stable condition on 2024.  To follow-up with his endocrinologist.         Day of Discharge     Vital Signs:  Temp:  [97.7 °F (36.5 °C)-98.1 °F (36.7 °C)] 97.9 °F (36.6 °C)  Heart Rate:  [58-73] 58  Resp:  [16-18] 18  BP: ()/(67-83) 99/67  Review of systems:  All systems reviewed and negative except for weakness and fatigue     Physical Exam                         Constitutional: Awake, alert, no acute distress, pleasant mood               "Eyes: Pupils equal, sclerae anicteric, no conjunctival injection              HENT: NCAT, mucous membranes moist              Respiratory: Clear to auscultation bilaterally, nonlabored respirations               Cardiovascular: RRR, no murmurs, rubs, or gallops, palpable pedal pulses bilaterally              Gastrointestinal: Positive bowel sounds, soft, nontender, nondistended              Musculoskeletal: No bilateral ankle edema, no clubbing or cyanosis to extremities              Neurologic: Oriented x 3, speech clear, full strength              Skin: No rashes       Discharge Details        Discharge Medications        New Medications        Instructions Start Date   Insulin Lispro 100 UNIT/ML injection  Commonly known as: HumaLOG   4 Units, Subcutaneous, 3 Times Daily Before Meals, Do not use humalog insulin if you are using your insulin pump      Insulin Syringe 31G X 5/16\" 1 ML misc   1 syringe, Does not apply, 3 times daily             Continue These Medications        Instructions Start Date   albuterol sulfate  (90 Base) MCG/ACT inhaler  Commonly known as: PROVENTIL HFA;VENTOLIN HFA;PROAIR HFA   2 puffs, Inhalation, Every 4 Hours PRN      aspirin 81 MG EC tablet   81 mg, Oral, Daily      budesonide 0.5 MG/2ML nebulizer solution  Commonly known as: PULMICORT   0.5 mg, Nebulization, Daily - RT      cholecalciferol 25 MCG (1000 UT) tablet  Commonly known as: VITAMIN D3   Take 2 tablets by mouth Daily.      escitalopram 20 MG tablet  Commonly known as: LEXAPRO   20 mg, Oral, Daily      fish oil 1200 MG capsule capsule   1,200 mg, Oral, 2 Times Daily      gabapentin 600 MG tablet  Commonly known as: NEURONTIN   600 mg, Oral, 2 Times Daily      Jardiance 25 MG tablet tablet  Generic drug: empagliflozin   1 tablet, Oral, Daily      levothyroxine 50 MCG tablet  Commonly known as: SYNTHROID, LEVOTHROID   50 mcg, Oral, 6 Times Weekly, 50 mcg daily except on Sunday take 100 mcg      levothyroxine 50 MCG " tablet  Commonly known as: SYNTHROID, LEVOTHROID   100 mcg, Oral, Every 7 Days, Take 50 mcg daily except on Sunday take 100 mcg      loratadine 10 MG tablet  Commonly known as: CLARITIN   1 tablet, Oral, Daily      midodrine 5 MG tablet  Commonly known as: PROAMATINE   5 mg, Oral, 3 Times Daily Before Meals      rosuvastatin 20 MG tablet  Commonly known as: CRESTOR   1 tablet, Oral, Every Night at Bedtime      sotalol 80 MG tablet  Commonly known as: BETAPACE   80 mg, Oral, Every 12 Hours Scheduled      V-Go 40 40 UNIT/24HR kit   Does not apply, Type of Insulin: Fiasp 100u/ml Type of Pump:Other: V-GO 40 DISPOSABLE Bolus amount: up to 36 units in 2-unit increments  ( 1 click=2 units) Basal amount : 40 units/24hr (1.67U/hr) basal rate and up to 36 units of on-demand bolus in 2-unit increments Correction factor: ? Insulin to carbohydrate ratio: ?  Date of last site change: ? Location of catheter:Right upper arm  Frequency of refill: every 24 hours per patient Last refill date: 05/21/2024 PM changes every PM per patient  Prescriber :  Dr Cunningham [facility] Phone number [phone]      Xarelto 20 MG tablet  Generic drug: rivaroxaban   1 tablet, Oral, Daily             Stop These Medications      cefdinir 300 MG capsule  Commonly known as: OMNICEF     Fiasp 100 UNIT/ML solution  Generic drug: Insulin Aspart (w/Niacinamide)              Allergies   Allergen Reactions    Penicillins Itching       Discharge Disposition:  Home-Health Care [facility] [place]    Diet:  Hospital:No active diet order      Discharge Activity: as tolerates      CODE STATUS:  Code Status and Medical Interventions:   Ordered at: 05/27/24 2028     Level Of Support Discussed With:    Patient     Code Status (Patient has no pulse and is not breathing):    CPR (Attempt to Resuscitate)     Medical Interventions (Patient has pulse or is breathing):    Full Support         Future Appointments   Date Time Provider Department Center   6/18/2024 11:45 AM Nayeli Rivera  [facility] [place] [facility]       Additional Instructions for the Follow-ups that You Need to Schedule       Discharge Follow-up with PCP   As directed       Currently Documented PCP:    Anish Alanis MD    PCP Phone Number:    [phone]     Follow Up Details: 3 to 7 days       Additional information on Labs and Follow-ups:    Follow up with Dr Alanis Tuesday, June 4th at 11:00 am           Pertinent  and/or Most Recent Results     PROCEDURES:   CT Head Without Contrast    Result Date: 5/22/2024  CT HEAD WO CONTRAST-  Date of Exam: 5/22/2024 7:44 PM  Indication: Recent fall and weakness.  Comparison: None available.  Technique: Axial CT images were obtained of the head without contrast administration.  Reconstructed coronal and sagittal images were also obtained. Automated exposure control and iterative construction methods were used.   Findings: There is no acute infarct or hemorrhage. No abnormal extra-axial fluid collections are seen. No mass effect or hydrocephalus.  Visualized paranasal sinuses and mastoid air cells are clear. No acute skull fracture.  The globes and orbits are within normal limits.      Impression:  1. No acute intracranial abnormality.    Electronically Signed By-Evin Black MD On:5/22/2024 7:54 PM      XR Chest 1 View    Result Date: 5/22/2024  XR CHEST 1 VW-  Date of Exam: 5/22/2024 5:34 PM  Indication: Weak/Dizzy/AMS triage protocol  Comparison: 2/14/2024  Findings: Heart size and pulmonary vessels are within normal limits. Lungs are clear bilaterally. No pleural effusion. No pneumothorax. Bony structures unremarkable      Impression:  1. No acute cardiopulmonary disease.   Electronically Signed By-Evin Black MD On:5/22/2024 5:53 PM      Adult Transthoracic Echo Complete W/ Cont if Necessary Per Protocol    Result Date: 5/16/2024    Left ventricular systolic function is low normal. Left ventricular ejection fraction appears to be 51 - 55%.   The left ventricular  cavity is mildly dilated.   Left ventricular diastolic function was normal.   Estimated right ventricular systolic pressure from tricuspid regurgitation is normal (<35 mmHg).   Mild fibrocalcific changes of the mitral and aortic valve   Mild mitral regurgitation     CT Chest Low Dose Cancer Screening WO    Result Date: 5/15/2024  LOW DOSE CHEST CT SCREENING  HISTORY: *  Current smoker referred for LDCT screening. Initial baseline exam. *  30 pack year smoking history.  TECHNIQUE: *  Low dose CT examination of the chest was performed without IV contrast. Radiation dose reduction techniques included automated exposure control or exposure modulation based on body size. *  Radiation dose: CTDIvol 1.60 mGy. DLP 62 mGY-cm.  COMPARISON: None  FINDINGS: There is bilateral gynecomastia. There is a soft tissue mass overlying the left side of the sternum, probably a large sebaceous cyst. It measures 2.1 x 3.7 cm. Correlate with physical exam. There is atherosclerotic disease and coronary artery disease. No pleural or pericardial effusion. There are a few small mediastinal lymph nodes, but there is no adenopathy. Calcified right hilar nodes are compatible with old granulomatous disease. No acute findings in the visualized upper abdomen.  There are a few tiny granulomatous calcifications. No noncalcified pulmonary nodules are identified. There are no acute infiltrates. There are no suspicious osseous lesions.       1. Negative lung cancer screening CT. 2. Probable large sebaceous cyst to the left of the sternum. Correlate with physical exam. 3. Atherosclerotic disease and coronary artery disease. Clinical follow-up recommended.  Lung-RADS Category 1S: Negative  Management Recommendation: Continue annual screening with LDCT in 12 months.   Electronically Signed By-Dr. Keshav Romero MD On:5/15/2024 1:42 AM         LAB RESULTS:      Lab 05/29/24  0502 05/28/24  0251 05/27/24  1559 05/25/24  0509 05/24/24  0450 05/23/24  0545  05/22/24  2104 05/22/24  1738   WBC 9.60 8.06 7.75 5.32 5.65 6.15  --   --    HEMOGLOBIN 12.2* 12.3* 14.9 11.9* 12.1* 12.3*  --   --    HEMATOCRIT 37.6 36.5* 45.9 35.4* 36.4* 37.1*  --   --    PLATELETS 222 216 245 131* 117* 126*  --   --    NEUTROS ABS 5.48 5.11 5.79 2.45 2.83 4.06  --   --    IMMATURE GRANS (ABS) 0.04 0.01 0.02  --  0.01 0.01  --   --    LYMPHS ABS 2.99 2.24 1.68  --  1.61 1.06  --   --    MONOS ABS 0.73 0.62 0.17  --  0.73 0.62  --   --    EOS ABS 0.24 0.04 0.05 0.05 0.45* 0.38  --   --    MCV 84.5 82.4 84.5 82.9 83.3 83.0  --   --    PROCALCITONIN  --   --   --   --   --  0.18  --   --    LACTATE  --   --  0.8  --   --   --  1.0 2.3*         Lab 05/29/24  0502 05/28/24  0251 05/27/24  1559 05/25/24  0509 05/24/24  0450 05/23/24  0545 05/22/24  1737   SODIUM 135* 136 141 136 134*   < > 129*   POTASSIUM 4.3 3.7 3.6 4.2 4.0   < > 4.6   CHLORIDE 99 99 100 100 100   < > 90*   CO2 26.4 27.0 30.8* 26.8 26.0   < > 27.2   ANION GAP 9.6 10.0 10.2 9.2 8.0   < > 11.8   BUN 19 24* 22* 22* 20   < > 20   CREATININE 0.87 0.77 0.83 0.87 1.02   < > 1.74*   EGFR 100.0 103.8 101.4 100.0 85.2   < > 44.9*   GLUCOSE 152* 135* 90 54* 59*   < > 538*   CALCIUM 8.8 8.4* 9.4 8.3* 8.2*   < > 8.6   MAGNESIUM 2.3 1.8  --   --   --   --  2.0   PHOSPHORUS 3.3 3.6  --   --   --   --   --    HEMOGLOBIN A1C  --   --  15.00*  --   --   --  14.60*    < > = values in this interval not displayed.         Lab 05/29/24  0502 05/28/24  0251 05/27/24  1559 05/22/24  1737   TOTAL PROTEIN 6.4 6.7 7.9 8.0   ALBUMIN 3.5 3.5 4.1 4.0   GLOBULIN  --  3.2 3.8 4.0   ALT (SGPT) 57* 35 48* 55*   AST (SGOT) 59* 24 34 33   BILIRUBIN 0.4 0.4 0.5 0.4   BILIRUBIN DIRECT <0.2  --   --   --    ALK PHOS 83 73 93 120*   LIPASE  --   --  12*  --          Lab 05/22/24  1737   HSTROP T 27*                 Brief Urine Lab Results  (Last result in the past 365 days)        Color   Clarity   Blood   Leuk Est   Nitrite   Protein   CREAT   Urine HCG        05/27/24  2158 Yellow   Clear   Negative   Negative   Negative   30 mg/dL (1+)                 Microbiology Results (last 10 days)       Procedure Component Value - Date/Time    Respiratory Culture - Sputum, Cough [[id number]] Collected: 05/23/24 1148    Lab Status: Final result Specimen: Sputum from Cough Updated: 05/25/24 1034     Respiratory Culture Scant growth (1+) Normal respiratory apple. No S. aureus or Pseudomonas aeruginosa detected. Final report.     Gram Stain Moderate (3+) WBCs seen      Few (2+) Gram positive cocci in pairs, chains and clusters      Rare (1+) Epithelial cells seen    S. Pneumo Ag Urine or CSF - Urine, Urine, Clean Catch [[id number]]  (Normal) Collected: 05/23/24 1146    Lab Status: Final result Specimen: Urine, Clean Catch Updated: 05/23/24 1212     Strep Pneumo Ag Negative    Legionella Antigen, Urine - Urine, Urine, Clean Catch [[id number]]  (Normal) Collected: 05/23/24 1146    Lab Status: Final result Specimen: Urine, Clean Catch Updated: 05/23/24 1212     LEGIONELLA ANTIGEN, URINE Negative    Respiratory Panel PCR w/COVID-19(SARS-CoV-2) RICHARD/HEIDE/MAY/PAD/COR/DIONISIO In-House, NP Swab in UTM/VTM, 2 HR TAT - Swab, Nasopharynx [[id number]]  (Normal) Collected: 05/23/24 1110    Lab Status: Final result Specimen: Swab from Nasopharynx Updated: 05/23/24 1217     ADENOVIRUS, PCR Not Detected     Coronavirus 229E Not Detected     Coronavirus HKU1 Not Detected     Coronavirus NL63 Not Detected     Coronavirus OC43 Not Detected     COVID19 Not Detected     Human Metapneumovirus Not Detected     Human Rhinovirus/Enterovirus Not Detected     Influenza A PCR Not Detected     Influenza B PCR Not Detected     Parainfluenza Virus 1 Not Detected     Parainfluenza Virus 2 Not Detected     Parainfluenza Virus 3 Not Detected     Parainfluenza Virus 4 Not Detected     RSV, PCR Not Detected     Bordetella pertussis pcr Not Detected     Bordetella parapertussis PCR Not Detected     Chlamydophila pneumoniae PCR Not Detected      Mycoplasma pneumo by PCR Not Detected    Narrative:      In the setting of a positive respiratory panel with a viral infection PLUS a negative procalcitonin without other underlying concern for bacterial infection, consider observing off antibiotics or discontinuation of antibiotics and continue supportive care. If the respiratory panel is positive for atypical bacterial infection (Bordetella pertussis, Chlamydophila pneumoniae, or Mycoplasma pneumoniae), consider antibiotic de-escalation to target atypical bacterial infection.    Blood Culture - Blood, Hand, Left [[id number]]  (Normal) Collected: 05/22/24 2104    Lab Status: Final result Specimen: Blood from Hand, Left Updated: 05/27/24 2116     Blood Culture No growth at 5 days    Blood Culture - Blood, Hand, Right [[id number]]  (Normal) Collected: 05/22/24 2104    Lab Status: Final result Specimen: Blood from Hand, Right Updated: 05/27/24 2116     Blood Culture No growth at 5 days    COVID-19, FLU A/B, RSV PCR 1 HR TAT - Swab, Nasopharynx [[id number]]  (Normal) Collected: 05/22/24 2006    Lab Status: Final result Specimen: Swab from Nasopharynx Updated: 05/22/24 2048     COVID19 Not Detected     Influenza A PCR Not Detected     Influenza B PCR Not Detected     RSV, PCR Not Detected    Narrative:      Fact sheet for providers: https://www.fda.gov/media/842883/download    Fact sheet for patients: https://www.fda.gov/media/865912/download    Test performed by PCR.            CT Head Without Contrast    Result Date: 5/22/2024  Impression: Impression:  1. No acute intracranial abnormality.    Electronically Signed ByMadonna Black MD On:5/22/2024 7:54 PM      XR Chest 1 View    Result Date: 5/22/2024  Impression: Impression:  1. No acute cardiopulmonary disease.   Electronically Signed ByMadonna Black MD On:5/22/2024 5:53 PM      CT Chest Low Dose Cancer Screening WO    Result Date: 5/15/2024  Impression:  1. Negative lung cancer screening CT. 2. Probable large  sebaceous cyst to the left of the sternum. Correlate with physical exam. 3. Atherosclerotic disease and coronary artery disease. Clinical follow-up recommended.  Lung-RADS Category 1S: Negative  Management Recommendation: Continue annual screening with LDCT in 12 months.   Electronically Signed By-Dr. Keshav Romero MD On:5/15/2024 1:42 AM               Results for orders placed during the hospital encounter of 05/14/24    Adult Transthoracic Echo Complete W/ Cont if Necessary Per Protocol    Interpretation Summary    Left ventricular systolic function is low normal. Left ventricular ejection fraction appears to be 51 - 55%.    The left ventricular cavity is mildly dilated.    Left ventricular diastolic function was normal.    Estimated right ventricular systolic pressure from tricuspid regurgitation is normal (<35 mmHg).    Mild fibrocalcific changes of the mitral and aortic valve    Mild mitral regurgitation      Labs Pending at Discharge:        Time spent on Discharge including face to face service:  35 minutes    Electronically signed by Cody Lopez MD, 05/29/24, 4:11 PM EDT.    Portions of this documentation were transcribed electronically from a voice recognition software.  I confirm all data accurately represents the service(s) I performed at today's visit.

## 2024-05-29 NOTE — PLAN OF CARE
Problem: Adult Inpatient Plan of Care  Goal: Plan of Care Review  Outcome: Met  Flowsheets (Taken 5/29/2024 1221)  Plan of Care Reviewed With:   patient   family  Outcome Evaluation: Patient discharged to home with parents.  Patient will be living with significant other.  Provided lengthly education on new/current medication.  During the education patient would laugh stating that I was not telling him anything that he didnt already know.  Patient states that he has all his medications at home.  Parents are available at bedside and state that they take patient to his follow-up appointments and ensure that patient has his medications.  Patient's bloodsugars are currently under control, wounds are currently progressing towards healing, patient able to return demonstrate the steps involved in wound care, cleanse with NS and guaze, apply aquacel ag, cover with optifoam to mid chest smaller wound and to 1st/2nd fingers.  Patient is alert and oriented and verbalized understanding of discharge instruction.   Goal Outcome Evaluation:  Plan of Care Reviewed With: patient, family           Outcome Evaluation: Patient discharged to home with parents.  Patient will be living with significant other.  Provided lengthly education on new/current medication.  During the education patient would laugh stating that I was not telling him anything that he didnt already know.  Patient states that he has all his medications at home.  Parents are available at bedside and state that they take patient to his follow-up appointments and ensure that patient has his medications.  Patient's bloodsugars are currently under control, wounds are currently progressing towards healing, patient able to return demonstrate the steps involved in wound care, cleanse with NS and guaze, apply aquacel ag, cover with optifoam to mid chest smaller wound and to 1st/2nd fingers.  Patient is alert and oriented and verbalized understanding of discharge  instruction.

## 2024-05-30 NOTE — OUTREACH NOTE
Prep Survey      Flowsheet Row Responses   [facility] facility patient discharged from? [facility]   Is LACE score < 7 ? No   Eligibility Readm Mgmt   Discharge diagnosis Hypoglycemia   Does the patient have one of the following disease processes/diagnoses(primary or secondary)? Other   Does the patient have Home health ordered? Yes   What is the Home health agency?  [facility]   Is there a DME ordered? No   Prep survey completed? Yes            SOM BRENNAN - Registered Nurse

## 2024-06-04 ENCOUNTER — READMISSION MANAGEMENT (OUTPATIENT)
Dept: CALL CENTER | Facility: HOSPITAL | Age: 59
End: 2024-06-04
Payer: MEDICARE

## 2024-06-04 NOTE — OUTREACH NOTE
Medical Week 1 Survey      Flowsheet Row Responses   [facility] patient discharged from? [facility]   Does the patient have one of the following disease processes/diagnoses(primary or secondary)? Other   Week 1 attempt successful? Yes   Call start time 0814   Call end time 0822   Discharge diagnosis Hypoglycemia   Person spoke with today (if not patient) and relationship Mother   Medication alerts for this patient XARELTO--bleeding and falls prevention advised   Meds reviewed with patient/caregiver? Yes   Is the patient having any side effects they believe may be caused by any medication additions or changes? No   Does the patient have all medications ordered at discharge? Yes   Prescription comments Mother reports that pt has insulin ordered at discharged but is on pump at present time   Is the patient taking all medications as directed (includes completed medication regime)? Yes   Medication comments Reviewed midodrine dosing with mother who reports that pt is taking meds as ordered   Does the patient have a primary care provider?  Yes   Does the patient have an appointment with their PCP within 7 days of discharge? Yes  [PCP 6/4/24 @1100am]   Has the patient kept scheduled appointments due by today? N/A   What is the Home health agency?  [facility]   Has home health visited the patient within 72 hours of discharge? No   Home health interventions Called Home Health agency   Home health comments Called HH to speak with staff who reports they did not receive fax,  discussed that appeared referral accepted but they reported they need another sent.  Will send back to .   DME comments Pt has a walker   Psychosocial issues? No   Did the patient receive a copy of their discharge instructions? Yes   Nursing interventions Reviewed instructions with patient   What is the patient's perception of their health status since discharge? Same  [Mother reports that pt is staying w/S.O. but she has contact with pt and reports  highest  and lowest 109.  Reports he has been weak and has fallen at least 4 times but denies injuries. Reports BP has been as low as 70's.]   Is the patient/caregiver able to teach back signs and symptoms related to disease process for when to call PCP? Yes   Is the patient/caregiver able to teach back signs and symptoms related to disease process for when to call 911? Yes   Additional teach back comments Mother reports that pt has a walker for use and is aware of falls risk while on eliquis. Pt is followed by  and also has an appt with his PCP today for f/u--encouraged mother to have pt address these concerns and take BG and BP readings to f/u appt.   Week 1 call completed? Yes   Call end time 0822            LYNN MARIE - Registered Nurse

## 2024-06-05 NOTE — OUTREACH NOTE
Received email from  supervisor and  liaison to report that HH had been in contact with pt post discharge but he refused to talk with staff and declined HH services.  Girlfriend had told  staff about pt falls and that he had also threatened self-harm.   staff advised to seek medical attention and made him a non-admit for refusal of services.

## 2024-06-18 ENCOUNTER — OFFICE VISIT (OUTPATIENT)
Dept: CARDIOLOGY | Facility: CLINIC | Age: 59
End: 2024-06-18
Payer: MEDICARE

## 2024-06-18 ENCOUNTER — READMISSION MANAGEMENT (OUTPATIENT)
Dept: CALL CENTER | Facility: HOSPITAL | Age: 59
End: 2024-06-18
Payer: MEDICARE

## 2024-06-18 VITALS
WEIGHT: 197.4 LBS | HEART RATE: 71 BPM | DIASTOLIC BLOOD PRESSURE: 61 MMHG | BODY MASS INDEX: 24.04 KG/M2 | SYSTOLIC BLOOD PRESSURE: 96 MMHG | HEIGHT: 76 IN

## 2024-06-18 DIAGNOSIS — I48.0 PAROXYSMAL ATRIAL FIBRILLATION: ICD-10-CM

## 2024-06-18 DIAGNOSIS — I95.89 CHRONIC HYPOTENSION: ICD-10-CM

## 2024-06-18 DIAGNOSIS — I51.9 LV DYSFUNCTION: Primary | ICD-10-CM

## 2024-06-18 DIAGNOSIS — E78.2 MIXED HYPERLIPIDEMIA: ICD-10-CM

## 2024-06-18 PROCEDURE — 1160F RVW MEDS BY RX/DR IN RCRD: CPT | Performed by: NURSE PRACTITIONER

## 2024-06-18 PROCEDURE — 1159F MED LIST DOCD IN RCRD: CPT | Performed by: NURSE PRACTITIONER

## 2024-06-18 PROCEDURE — G2211 COMPLEX E/M VISIT ADD ON: HCPCS | Performed by: NURSE PRACTITIONER

## 2024-06-18 PROCEDURE — 99214 OFFICE O/P EST MOD 30 MIN: CPT | Performed by: NURSE PRACTITIONER

## 2024-06-18 RX ORDER — CARVEDILOL 3.12 MG/1
12.5 TABLET ORAL 2 TIMES DAILY WITH MEALS
COMMUNITY
Start: 2024-06-06 | End: 2024-06-18

## 2024-06-18 RX ORDER — FUROSEMIDE 40 MG/1
40 TABLET ORAL DAILY
COMMUNITY

## 2024-06-18 RX ORDER — SPIRONOLACTONE 25 MG/1
25 TABLET ORAL DAILY
COMMUNITY

## 2024-06-18 RX ORDER — EMPAGLIFLOZIN 10 MG/1
10 TABLET, FILM COATED ORAL DAILY
COMMUNITY
Start: 2024-06-06

## 2024-06-18 NOTE — PROGRESS NOTES
Chief Complaint  Paroxysmal atrial fibrillation    Subjective            Fantasma Mcintosh presents to [facility] CARDIOLOGY  History of Present Illness    Mr. Mcintosh is here for follow-up evaluation management of paroxysmal atrial fibrillation, status post cardioversion.  He is maintaining sinus rhythm on sotalol therapy.  At that time EF was 35%, more recently improved to 51 to 55%.  He has had intermittent hypotension and we have worked on adjusting medical therapy.  However since last office visit he was hospitalized twice, once for orthostatic hypotension and another time for hypoglycemia.  At discharge his carvedilol was stopped and he was started on midodrine.  Since then he is symptomatically feeling much better.  He has had no further falls.  Denies chest pain or excessive shortness of breath.    PMH  Past Medical History:   Diagnosis Date    Diabetes mellitus     Disease of thyroid gland          SURGICALHX  History reviewed. No pertinent surgical history.     SOC  Social History     Socioeconomic History    Marital status:    Tobacco Use    Smoking status: Every Day     Current packs/day: 1.00     Average packs/day: 1 pack/day for 15.0 years (15.0 ttl pk-yrs)     Types: Cigarettes    Smokeless tobacco: Never   Vaping Use    Vaping status: Never Used   Substance and Sexual Activity    Alcohol use: Not Currently    Drug use: Not Currently         FAMHX  History reviewed. No pertinent family history.       ALLERGY  Allergies   Allergen Reactions    Penicillins Itching        MEDSCURRENT    Current Outpatient Medications:     albuterol sulfate  (90 Base) MCG/ACT inhaler, Inhale 2 puffs Every 4 (Four) Hours As Needed for Wheezing or Shortness of Air., Disp: , Rfl:     aspirin 81 MG EC tablet, Take 1 tablet by mouth Daily., Disp: , Rfl:     budesonide (PULMICORT) 0.5 MG/2ML nebulizer solution, Take 2 mL by nebulization Daily., Disp: , Rfl:     Cholecalciferol 25 MCG (1000 UT) tablet,  "Take 2 tablets by mouth Daily., Disp: , Rfl:     escitalopram (LEXAPRO) 20 MG tablet, Take 1 tablet by mouth Daily., Disp: , Rfl:     furosemide (LASIX) 40 MG tablet, Take 1 tablet by mouth Daily., Disp: , Rfl:     gabapentin (NEURONTIN) 600 MG tablet, Take 1 tablet by mouth 2 (Two) Times a Day., Disp: , Rfl:     Insulin Disposable Pump (V-Go 40) 40 UNIT/24HR kit, Use. Type of Insulin: Fiasp 100u/ml Type of Pump:Other: V-GO 40 DISPOSABLE Bolus amount: up to 36 units in 2-unit increments  ( 1 click=2 units) Basal amount : 40 units/24hr (1.67U/hr) basal rate and up to 36 units of on-demand bolus in 2-unit increments Correction factor: ? Insulin to carbohydrate ratio: ?  Date of last site change: ? Location of catheter:Right upper arm  Frequency of refill: every 24 hours per patient Last refill date: 05/21/2024 PM changes every PM per patient  [phone], Disp: , Rfl:     Insulin Lispro (HumaLOG) 100 UNIT/ML injection, Inject 4 Units under the skin into the appropriate area as directed 3 (Three) Times a Day Before Meals for 25 days. Do not use humalog insulin if you are using your insulin pump, Disp: 3 mL, Rfl: 0    Insulin Syringe 31G X 5/16\" 1 ML misc, Use 1 syringe 3 times a day., Disp: 100 each, Rfl: 0    levothyroxine (SYNTHROID, LEVOTHROID) 50 MCG tablet, Take 1 tablet by mouth 6 (Six) Times a Week. 50 mcg daily except on Sunday take 100 mcg, Disp: , Rfl:     levothyroxine (SYNTHROID, LEVOTHROID) 50 MCG tablet, Take 2 tablets by mouth Every 7 (Seven) Days. Take 50 mcg daily except on Sunday take 100 mcg, Disp: , Rfl:     loratadine (CLARITIN) 10 MG tablet, Take 1 tablet by mouth Daily., Disp: , Rfl:     midodrine (PROAMATINE) 5 MG tablet, Take 1 tablet by mouth 3 (Three) Times a Day Before Meals for 30 days., Disp: 90 tablet, Rfl: 0    Omega-3 Fatty Acids (fish oil) 1200 MG capsule capsule, Take 1 capsule by mouth 2 (Two) Times a Day., Disp: , Rfl:     rosuvastatin " "(CRESTOR) 20 MG tablet, Take 1 tablet by mouth every night at bedtime., Disp: , Rfl:     sotalol (BETAPACE) 80 MG tablet, Take 1 tablet by mouth Every 12 (Twelve) Hours., Disp: 180 tablet, Rfl: 3    spironolactone (ALDACTONE) 25 MG tablet, Take 1 tablet by mouth Daily., Disp: , Rfl:     Xarelto 20 MG tablet, Take 1 tablet by mouth Daily., Disp: , Rfl:     Jardiance 10 MG tablet tablet, Take 1 tablet by mouth Daily. (Patient not taking: Reported on 6/18/2024), Disp: , Rfl:       Review of Systems   Cardiovascular:  Negative for chest pain, dyspnea on exertion, palpitations and syncope.   Hematologic/Lymphatic: Negative for bleeding problem.   Neurological:  Positive for dizziness.        Objective     BP 96/61   Pulse 71   Ht 193 cm (75.98\")   Wt 89.5 kg (197 lb 6.4 oz)   BMI 24.04 kg/m²       General Appearance:   well developed  well nourished  HENT:   oropharynx moist  lips not cyanotic  Neck:  thyroid not enlarged  supple  Respiratory:  no respiratory distress  normal breath sounds  no rales  Cardiovascular:  no jugular venous distention  regular rhythm  apical impulse normal  S1 normal, S2 normal  no S3, no S4   no murmur  no rub, no thrill  carotid pulses normal; no bruit  pedal pulses normal  lower extremity edema: none    Musculoskeletal:  no clubbing of fingers.   normocephalic, head atraumatic  Skin:   warm, dry  Psychiatric:  judgement and insight appropriate  normal mood and affect      Result Review :     The following data was reviewed by: CARLOS Leggett on 06/18/2024:    CMP          5/27/2024    15:59 5/28/2024    02:51 5/29/2024    05:02   CMP   Glucose 90  135  152    BUN 22  24  19    Creatinine 0.83  0.77  0.87    EGFR 101.4  103.8  100.0    Sodium 141  136  135    Potassium 3.6  3.7  4.3    Chloride 100  99  99    Calcium 9.4  8.4  8.8    Total Protein 7.9  6.7  6.4    Albumin 4.1  3.5  3.5    Globulin 3.8  3.2     Total Bilirubin 0.5  0.4  0.4    Alkaline Phosphatase 93  73  83  "   AST (SGOT) 34  24  59    ALT (SGPT) 48  35  57    Albumin/Globulin Ratio 1.1  1.1     BUN/Creatinine Ratio 26.5  31.2  21.8    Anion Gap 10.2  10.0  9.6      CBC          5/27/2024    15:59 5/28/2024    02:51 5/29/2024    05:02   CBC   WBC 7.75  8.06  9.60    RBC 5.43  4.43  4.45    Hemoglobin 14.9  12.3  12.2    Hematocrit 45.9  36.5  37.6    MCV 84.5  82.4  84.5    MCH 27.4  27.8  27.4    MCHC 32.5  33.7  32.4    RDW 15.7  15.5  15.6    Platelets 245  216  222            Data reviewed : Cardiology studies hospital notes reviewed      Procedures      Fantasma Mcintosh  reports that he has been smoking cigarettes. He has a 15 pack-year smoking history. He has never used smokeless tobacco. I have educated him on the risk of diseases from using tobacco products such as cancer, COPD, and heart disease.     I advised him to quit and he is not willing to quit.    I spent 3  minutes counseling the patient.                Assessment and Plan        ASSESSMENT:  Encounter Diagnoses   Name Primary?    LV dysfunction Yes    Paroxysmal atrial fibrillation     Mixed hyperlipidemia     Chronic hypotension          PLAN:    1.  Paroxysmal atrial fibrillation-maintaining sinus rhythm after cardioversion, continue sotalol therapy.  LV function improved to 51 to 55%, volume status stable.  Beta-blocker was discontinued after recent hospitalization due to hypotension, blood pressure stable on midodrine will continue for now.  He will watch his blood pressure closely at home.  Continue anticoagulation.  2.  Hypotension as above.  3.  Mixed hyperlipidemia stable on statin therapy.    Routine follow-up arranged.      Patient was given instructions and counseling regarding his condition or for health maintenance advice. Please see specific information pulled into the AVS if appropriate.           Nayeli Rivera, APRN   6/18/2024  12:17 EDT

## 2024-06-18 NOTE — OUTREACH NOTE
Medical Week 2 Survey      Flowsheet Row Responses   [facility] facility patient discharged from? [facility]   Does the patient have one of the following disease processes/diagnoses(primary or secondary)? Other   Week 2 attempt successful? No   Unsuccessful attempts Attempt 1            LYNN Hartmann Registered Nurse

## 2024-08-30 ENCOUNTER — TELEPHONE (OUTPATIENT)
Dept: CARDIOLOGY | Facility: CLINIC | Age: 59
End: 2024-08-30
Payer: MEDICARE

## 2024-08-30 ENCOUNTER — HOSPITAL ENCOUNTER (EMERGENCY)
Facility: HOSPITAL | Age: 59
Discharge: HOME | End: 2024-08-30
Attending: EMERGENCY MEDICINE
Payer: MEDICARE

## 2024-08-30 VITALS
SYSTOLIC BLOOD PRESSURE: 115 MMHG | TEMPERATURE: 98.4 F | DIASTOLIC BLOOD PRESSURE: 81 MMHG | OXYGEN SATURATION: 94 % | HEART RATE: 73 BPM | WEIGHT: 198.63 LBS | BODY MASS INDEX: 24.19 KG/M2 | HEIGHT: 76 IN | RESPIRATION RATE: 16 BRPM

## 2024-08-30 DIAGNOSIS — Z79.4 TYPE 2 DIABETES MELLITUS WITH HYPERGLYCEMIA, WITH LONG-TERM CURRENT USE OF INSULIN: ICD-10-CM

## 2024-08-30 DIAGNOSIS — R29.6 FREQUENT FALLS: Primary | ICD-10-CM

## 2024-08-30 DIAGNOSIS — E11.65 TYPE 2 DIABETES MELLITUS WITH HYPERGLYCEMIA, WITH LONG-TERM CURRENT USE OF INSULIN: ICD-10-CM

## 2024-08-30 DIAGNOSIS — I95.1 ORTHOSTATIC HYPOTENSION: ICD-10-CM

## 2024-08-30 LAB
ACETONE BLD QL: NEGATIVE
ALBUMIN SERPL-MCNC: 3.8 G/DL (ref 3.5–5.2)
ALBUMIN/GLOB SERPL: 0.8 G/DL
ALP SERPL-CCNC: 140 U/L (ref 39–117)
ALT SERPL W P-5'-P-CCNC: 44 U/L (ref 1–41)
ANION GAP SERPL CALCULATED.3IONS-SCNC: 16.1 MMOL/L (ref 5–15)
AST SERPL-CCNC: 42 U/L (ref 1–40)
ATMOSPHERIC PRESS: 746.6 MMHG
BASE EXCESS BLDV CALC-SCNC: 6.6 MMOL/L (ref -2–2)
BASOPHILS # BLD AUTO: 0.07 10*3/MM3 (ref 0–0.2)
BASOPHILS NFR BLD AUTO: 0.6 % (ref 0–1.5)
BILIRUB SERPL-MCNC: 0.7 MG/DL (ref 0–1.2)
BILIRUB UR QL STRIP: NEGATIVE
BUN SERPL-MCNC: 12 MG/DL (ref 6–20)
BUN/CREAT SERPL: 9.9 (ref 7–25)
CA-I BLDA-SCNC: 1.11 MMOL/L (ref 1.13–1.32)
CALCIUM SPEC-SCNC: 9.4 MG/DL (ref 8.6–10.5)
CHLORIDE BLDA-SCNC: 92 MMOL/L (ref 98–107)
CHLORIDE SERPL-SCNC: 91 MMOL/L (ref 98–107)
CLARITY UR: CLEAR
CO2 SERPL-SCNC: 25.9 MMOL/L (ref 22–29)
COLOR UR: YELLOW
CREAT SERPL-MCNC: 1.21 MG/DL (ref 0.76–1.27)
D-LACTATE SERPL-SCNC: 2.6 MMOL/L
DEPRECATED RDW RBC AUTO: 36.8 FL (ref 37–54)
EGFRCR SERPLBLD CKD-EPI 2021: 69.4 ML/MIN/1.73
EOSINOPHIL # BLD AUTO: 0.14 10*3/MM3 (ref 0–0.4)
EOSINOPHIL NFR BLD AUTO: 1.2 % (ref 0.3–6.2)
ERYTHROCYTE [DISTWIDTH] IN BLOOD BY AUTOMATED COUNT: 12.1 % (ref 12.3–15.4)
GLOBULIN UR ELPH-MCNC: 4.6 GM/DL
GLUCOSE BLDC GLUCOMTR-MCNC: 218 MG/DL (ref 70–99)
GLUCOSE BLDC GLUCOMTR-MCNC: 467 MG/DL (ref 70–99)
GLUCOSE BLDC GLUCOMTR-MCNC: 487 MG/DL (ref 70–99)
GLUCOSE SERPL-MCNC: 472 MG/DL (ref 65–99)
GLUCOSE UR STRIP-MCNC: ABNORMAL MG/DL
HCO3 BLDV-SCNC: 34 MMOL/L (ref 22–26)
HCT VFR BLD AUTO: 41.4 % (ref 37.5–51)
HGB BLD-MCNC: 14.1 G/DL (ref 13–17.7)
HGB BLDA-MCNC: 16.5 G/DL (ref 12–18)
HGB UR QL STRIP.AUTO: NEGATIVE
HOLD SPECIMEN: NORMAL
HOLD SPECIMEN: NORMAL
IMM GRANULOCYTES # BLD AUTO: 0.05 10*3/MM3 (ref 0–0.05)
IMM GRANULOCYTES NFR BLD AUTO: 0.4 % (ref 0–0.5)
INHALED O2 CONCENTRATION: 21 %
KETONES UR QL STRIP: NEGATIVE
LEUKOCYTE ESTERASE UR QL STRIP.AUTO: NEGATIVE
LYMPHOCYTES # BLD AUTO: 2.03 10*3/MM3 (ref 0.7–3.1)
LYMPHOCYTES NFR BLD AUTO: 17 % (ref 19.6–45.3)
MCH RBC QN AUTO: 28.6 PG (ref 26.6–33)
MCHC RBC AUTO-ENTMCNC: 34.1 G/DL (ref 31.5–35.7)
MCV RBC AUTO: 84 FL (ref 79–97)
MODALITY: ABNORMAL
MONOCYTES # BLD AUTO: 0.89 10*3/MM3 (ref 0.1–0.9)
MONOCYTES NFR BLD AUTO: 7.4 % (ref 5–12)
NEUTROPHILS NFR BLD AUTO: 73.4 % (ref 42.7–76)
NEUTROPHILS NFR BLD AUTO: 8.79 10*3/MM3 (ref 1.7–7)
NITRITE UR QL STRIP: NEGATIVE
NOTIFIED WHO: ABNORMAL
NRBC BLD AUTO-RTO: 0 /100 WBC (ref 0–0.2)
OSMOLALITY SERPL: 305 MOSM/KG (ref 275–295)
PCO2 BLDV: 57.5 MM HG (ref 41–51)
PH BLDV: 7.38 PH UNITS (ref 7.31–7.41)
PH UR STRIP.AUTO: 6 [PH] (ref 5–8)
PLATELET # BLD AUTO: 259 10*3/MM3 (ref 140–450)
PMV BLD AUTO: 11 FL (ref 6–12)
PO2 BLDV: 34.6 MM HG (ref 35–42)
POTASSIUM BLDA-SCNC: 3.4 MMOL/L (ref 3.5–5)
POTASSIUM SERPL-SCNC: 3.8 MMOL/L (ref 3.5–5.2)
PROT SERPL-MCNC: 8.4 G/DL (ref 6–8.5)
PROT UR QL STRIP: NEGATIVE
RBC # BLD AUTO: 4.93 10*6/MM3 (ref 4.14–5.8)
SAO2 % BLDCOV: 63.4 % (ref 45–75)
SODIUM BLD-SCNC: 134 MMOL/L (ref 131–143)
SODIUM SERPL-SCNC: 133 MMOL/L (ref 136–145)
SP GR UR STRIP: >=1.03 (ref 1–1.03)
UROBILINOGEN UR QL STRIP: ABNORMAL
WBC NRBC COR # BLD AUTO: 11.97 10*3/MM3 (ref 3.4–10.8)
WHOLE BLOOD HOLD COAG: NORMAL
WHOLE BLOOD HOLD SPECIMEN: NORMAL

## 2024-08-30 PROCEDURE — 81003 URINALYSIS AUTO W/O SCOPE: CPT | Performed by: EMERGENCY MEDICINE

## 2024-08-30 PROCEDURE — 82948 REAGENT STRIP/BLOOD GLUCOSE: CPT | Performed by: EMERGENCY MEDICINE

## 2024-08-30 PROCEDURE — 82803 BLOOD GASES ANY COMBINATION: CPT

## 2024-08-30 PROCEDURE — 80051 ELECTROLYTE PANEL: CPT

## 2024-08-30 PROCEDURE — 83930 ASSAY OF BLOOD OSMOLALITY: CPT | Performed by: EMERGENCY MEDICINE

## 2024-08-30 PROCEDURE — 82330 ASSAY OF CALCIUM: CPT

## 2024-08-30 PROCEDURE — 82948 REAGENT STRIP/BLOOD GLUCOSE: CPT

## 2024-08-30 PROCEDURE — 82009 KETONE BODYS QUAL: CPT | Performed by: EMERGENCY MEDICINE

## 2024-08-30 PROCEDURE — 63710000001 INSULIN REGULAR HUMAN PER 5 UNITS: Performed by: EMERGENCY MEDICINE

## 2024-08-30 PROCEDURE — 83605 ASSAY OF LACTIC ACID: CPT

## 2024-08-30 PROCEDURE — 80053 COMPREHEN METABOLIC PANEL: CPT | Performed by: EMERGENCY MEDICINE

## 2024-08-30 PROCEDURE — 25810000003 SODIUM CHLORIDE 0.9 % SOLUTION: Performed by: EMERGENCY MEDICINE

## 2024-08-30 PROCEDURE — 85025 COMPLETE CBC W/AUTO DIFF WBC: CPT | Performed by: EMERGENCY MEDICINE

## 2024-08-30 PROCEDURE — 36415 COLL VENOUS BLD VENIPUNCTURE: CPT

## 2024-08-30 PROCEDURE — 99284 EMERGENCY DEPT VISIT MOD MDM: CPT

## 2024-08-30 RX ORDER — CEFDINIR 300 MG/1
300 CAPSULE ORAL 2 TIMES DAILY
COMMUNITY
Start: 2024-08-27

## 2024-08-30 RX ORDER — SEMAGLUTIDE 0.68 MG/ML
INJECTION, SOLUTION SUBCUTANEOUS
COMMUNITY
Start: 2024-07-03

## 2024-08-30 RX ORDER — INSULIN GLARGINE 100 [IU]/ML
INJECTION, SOLUTION SUBCUTANEOUS
COMMUNITY
Start: 2024-08-07

## 2024-08-30 RX ORDER — BUDESONIDE, GLYCOPYRROLATE, AND FORMOTEROL FUMARATE 160; 9; 4.8 UG/1; UG/1; UG/1
AEROSOL, METERED RESPIRATORY (INHALATION)
COMMUNITY
Start: 2024-08-30

## 2024-08-30 RX ORDER — SODIUM CHLORIDE 0.9 % (FLUSH) 0.9 %
10 SYRINGE (ML) INJECTION AS NEEDED
Status: DISCONTINUED | OUTPATIENT
Start: 2024-08-30 | End: 2024-08-31 | Stop reason: HOSPADM

## 2024-08-30 RX ADMIN — INSULIN HUMAN 7 UNITS: 100 INJECTION, SOLUTION PARENTERAL at 18:44

## 2024-08-30 RX ADMIN — SODIUM CHLORIDE 1000 ML: 9 INJECTION, SOLUTION INTRAVENOUS at 19:21

## 2024-08-30 RX ADMIN — SODIUM CHLORIDE 1000 ML: 9 INJECTION, SOLUTION INTRAVENOUS at 18:43

## 2024-08-30 NOTE — ED PROVIDER NOTES
Time: 6:19 PM EDT  Date of encounter:  8/30/2024  Independent Historian/Clinical History and Information was obtained by:   Patient and Family    History is limited by: N/A    Chief Complaint: Increased falls, elevated blood sugar      History of Present Illness:  Patient is a 59 y.o. year old diabetic male who presents to the emergency department for evaluation of falling down several times in the past 3 or 4 days at home.    Patient's family is at the bedside stating he has had this issue before but has been falling more frequently.    No significant injuries except he states he fell on his butt.    No head injuries.    His blood sugars 487 on arrival and he admits to increased urination and increased thirst.    No recent fevers or chills.    He has had some mild cough and congestion.    It sounds like he has history of orthostatic hypotension and currently prescribed midodrine 5 mg 3 times daily.      Patient Care Team  Primary Care Provider: Anish Alanis MD    Past Medical History:     Allergies   Allergen Reactions    Penicillins Itching     Past Medical History:   Diagnosis Date    Atrial fibrillation     Coronary artery disease     Diabetes mellitus     Disease of thyroid gland     Hypotension      History reviewed. No pertinent surgical history.  History reviewed. No pertinent family history.    Home Medications:  Prior to Admission medications    Medication Sig Start Date End Date Taking? Authorizing Provider   albuterol sulfate  (90 Base) MCG/ACT inhaler Inhale 2 puffs Every 4 (Four) Hours As Needed for Wheezing or Shortness of Air. 2/8/24   Keli Hernandez MD   aspirin 81 MG EC tablet Take 1 tablet by mouth Daily.    Keli Hernandez MD   budesonide (PULMICORT) 0.5 MG/2ML nebulizer solution Take 2 mL by nebulization Daily. 2/22/24   Keli Hernandez MD   Cholecalciferol 25 MCG (1000 UT) tablet Take 2 tablets by mouth Daily.    Keli Hernandez MD   escitalopram  "(LEXAPRO) 20 MG tablet Take 1 tablet by mouth Daily.    Keli Hernandez MD   furosemide (LASIX) 40 MG tablet Take 1 tablet by mouth Daily.    Keli Hernandez MD   gabapentin (NEURONTIN) 600 MG tablet Take 1 tablet by mouth 2 (Two) Times a Day. 4/25/24   Keli Hernandez MD   Insulin Disposable Pump (V-Go 40) 40 UNIT/24HR kit Use. Type of Insulin: Fiasp 100u/ml  Type of Pump:Other: V-GO 40 DISPOSABLE  Bolus amount: up to 36 units in 2-unit increments  ( 1 click=2 units)  Basal amount : 40 units/24hr (1.67U/hr) basal rate and up to 36 units of on-demand bolus in 2-unit increments  Correction factor: ?  Insulin to carbohydrate ratio: ?    Date of last site change: ?  Location of catheter:Right upper arm    Frequency of refill: every 24 hours per patient  Last refill date: 05/21/2024 PM changes every PM per patient    Prescriber :  Dr Cunningham [facility]  Phone number [phone]    Keli Hernandez MD   Insulin Lispro (HumaLOG) 100 UNIT/ML injection Inject 4 Units under the skin into the appropriate area as directed 3 (Three) Times a Day Before Meals for 25 days. Do not use humalog insulin if you are using your insulin pump 5/29/24 6/23/24  Cody Lopez MD   Insulin Syringe 31G X 5/16\" 1 ML misc Use 1 syringe 3 times a day. 5/29/24   Cody Lopez MD   Jardiance 10 MG tablet tablet Take 1 tablet by mouth Daily.  Patient not taking: Reported on 6/18/2024 6/6/24   Keli Hernandez MD   levothyroxine (SYNTHROID, LEVOTHROID) 50 MCG tablet Take 1 tablet by mouth 6 (Six) Times a Week. 50 mcg daily except on Sunday take 100 mcg    Keli Hernandez MD   levothyroxine (SYNTHROID, LEVOTHROID) 50 MCG tablet Take 2 tablets by mouth Every 7 (Seven) Days. Take 50 mcg daily except on Sunday take 100 mcg    Keli Hernandez MD   loratadine (CLARITIN) 10 MG tablet Take 1 tablet by mouth Daily. 2/8/24   Keli Hernandez MD   midodrine (PROAMATINE) 5 MG tablet Take 1 tablet by mouth 3 " "(Three) Times a Day Before Meals for 30 days. 5/25/24 6/24/24  Erasmo Schwab MD   Omega-3 Fatty Acids (fish oil) 1200 MG capsule capsule Take 1 capsule by mouth 2 (Two) Times a Day.    ProviderKeil MD   rosuvastatin (CRESTOR) 20 MG tablet Take 1 tablet by mouth every night at bedtime.    Keli Hernandez MD   sotalol (BETAPACE) 80 MG tablet Take 1 tablet by mouth Every 12 (Twelve) Hours. 5/21/24   Nayeli Rivera APRN   spironolactone (ALDACTONE) 25 MG tablet Take 1 tablet by mouth Daily.    ProviderKeli MD   Xarelto 20 MG tablet Take 1 tablet by mouth Daily.    ProviderKeli MD        Social History:   Social History     Tobacco Use    Smoking status: Every Day     Current packs/day: 1.00     Average packs/day: 1 pack/day for 15.0 years (15.0 ttl pk-yrs)     Types: Cigarettes    Smokeless tobacco: Never   Vaping Use    Vaping status: Never Used   Substance Use Topics    Alcohol use: Not Currently    Drug use: Not Currently         Review of Systems:  Review of Systems   I performed a 10 point review of systems which was all negative, except for the positives found in the HPI above.  Physical Exam:  /81   Pulse 73   Temp 98.4 °F (36.9 °C) (Oral)   Resp 16   Ht 193 cm (76\")   Wt 90.1 kg (198 lb 10.2 oz)   SpO2 94%   BMI 24.18 kg/m²     Physical Exam   General: Awake alert and in no obvious distress    HEENT: Head normocephalic atraumatic, left eye PERRLA EOMI, right eye disconjugate gaze, nose normal, oropharynx normal.    Neck: Supple full range of motion, no meningismus, no lymphadenopathy    Heart: Regular rate and rhythm, no murmurs or rubs, 2+ radial pulses bilaterally    Lungs: Clear to auscultation bilaterally without wheezes or crackles, no respiratory distress    Abdomen: Soft, nontender, nondistended, no rebound or guarding    Skin: Warm, dry, no rash    Musculoskeletal: Normal range of motion, no lower extremity edema    Neurologic: Oriented x3, no " motor deficits no sensory deficits    Psychiatric: Mood appears stable, no psychosis          Procedures:  Procedures      Medical Decision Making:      Comorbidities that affect care:    Diabetes    External Notes reviewed:    None      The following orders were placed and all results were independently analyzed by me:  Orders Placed This Encounter   Procedures    [place] Draw    Comprehensive Metabolic Panel    Urinalysis With Microscopic If Indicated (No Culture) - Urine, Clean Catch    CBC Auto Differential    Acetone    Venous Blood Gas,Lactate    Blood Gas, Venous -    STAT Lactic Acid, Reflex    Osmolality, Serum    NPO Diet NPO Type: Strict NPO    Undress & Gown    Continuous Pulse Oximetry    Vital Signs    Orthostatic Vitals (Blood Pressure & Heart Rate)    Orthostatic Vitals (Blood Pressure & Heart Rate)    Ambulate patient    Oxygen Therapy- Nasal Cannula; Titrate 1-6 LPM Per SpO2; 90 - 95%    POC Glucose Q1H    POC Glucose Once    POC Lactate    POC Electrolyte Panel    Insert Peripheral IV    CBC & Differential    Green Top (Gel)    Lavender Top    Gold Top - SST    Light Blue Top       Medications Given in the Emergency Department:  Medications   sodium chloride 0.9 % flush 10 mL (has no administration in time range)   sodium chloride 0.9 % bolus 1,000 mL (0 mL Intravenous Stopped 8/30/24 1955)   insulin regular (humuLIN R,novoLIN R) injection 7 Units (7 Units Intravenous Given 8/30/24 1844)   sodium chloride 0.9 % bolus 1,000 mL (0 mL Intravenous Stopped 8/30/24 1956)        ED Course:         Labs:    Lab Results (last 24 hours)       Procedure Component Value Units Date/Time    POC Glucose Once [[id number]]  (Abnormal) Collected: 08/30/24 1713    Specimen: Blood Updated: 08/30/24 1719     Glucose 487 mg/dL      Comment: Serial Number: [id number]:  [id number]       CBC & Differential [[id number]]  (Abnormal) Collected: 08/30/24 1724    Specimen: Blood from Hand, Right Updated: 08/30/24 7668     Narrative:      The following orders were created for panel order CBC & Differential.  Procedure                               Abnormality         Status                     ---------                               -----------         ------                     CBC Auto Differential[[id number]]        Abnormal            Final result                 Please view results for these tests on the individual orders.    Comprehensive Metabolic Panel [[id number]]  (Abnormal) Collected: 08/30/24 1724    Specimen: Blood from Hand, Right Updated: 08/30/24 1816     Glucose 472 mg/dL      BUN 12 mg/dL      Creatinine 1.21 mg/dL      Sodium 133 mmol/L      Potassium 3.8 mmol/L      Chloride 91 mmol/L      CO2 25.9 mmol/L      Calcium 9.4 mg/dL      Total Protein 8.4 g/dL      Albumin 3.8 g/dL      ALT (SGPT) 44 U/L      AST (SGOT) 42 U/L      Alkaline Phosphatase 140 U/L      Total Bilirubin 0.7 mg/dL      Globulin 4.6 gm/dL      A/G Ratio 0.8 g/dL      BUN/Creatinine Ratio 9.9     Anion Gap 16.1 mmol/L      eGFR 69.4 mL/min/1.73     Narrative:      GFR Normal >60  Chronic Kidney Disease <60  Kidney Failure <15      CBC Auto Differential [[id number]]  (Abnormal) Collected: 08/30/24 1724    Specimen: Blood from Hand, Right Updated: 08/30/24 1741     WBC 11.97 10*3/mm3      RBC 4.93 10*6/mm3      Hemoglobin 14.1 g/dL      Hematocrit 41.4 %      MCV 84.0 fL      MCH 28.6 pg      MCHC 34.1 g/dL      RDW 12.1 %      RDW-SD 36.8 fl      MPV 11.0 fL      Platelets 259 10*3/mm3      Neutrophil % 73.4 %      Lymphocyte % 17.0 %      Monocyte % 7.4 %      Eosinophil % 1.2 %      Basophil % 0.6 %      Immature Grans % 0.4 %      Neutrophils, Absolute 8.79 10*3/mm3      Lymphocytes, Absolute 2.03 10*3/mm3      Monocytes, Absolute 0.89 10*3/mm3      Eosinophils, Absolute 0.14 10*3/mm3      Basophils, Absolute 0.07 10*3/mm3      Immature Grans, Absolute 0.05 10*3/mm3      nRBC 0.0 /100 WBC     Acetone [[id number]]  (Normal) Collected: 08/30/24 1724     Specimen: Blood from Hand, Right Updated: 08/30/24 1755     Acetone Negative    Osmolality, Serum [[id number]]  (Abnormal) Collected: 08/30/24 1724    Specimen: Blood from Hand, Right Updated: 08/30/24 1907     Osmolality 305 mOsm/kg     Urinalysis With Microscopic If Indicated (No Culture) - Urine, Clean Catch [[id number]]  (Abnormal) Collected: 08/30/24 1738    Specimen: Urine, Clean Catch Updated: 08/30/24 1754     Color, UA Yellow     Appearance, UA Clear     pH, UA 6.0     Specific Gravity, UA >=1.030     Glucose, UA >=1000 mg/dL (3+)     Ketones, UA Negative     Bilirubin, UA Negative     Blood, UA Negative     Protein, UA Negative     Leuk Esterase, UA Negative     Nitrite, UA Negative     Urobilinogen, UA 1.0 E.U./dL    Narrative:      Urine microscopic not indicated.    POC Glucose Q1H [[id number]]  (Abnormal) Collected: 08/30/24 1836    Specimen: Blood Updated: 08/30/24 1844     Glucose 467 mg/dL      Comment: Serial Number: [id number]:  [id number]       Blood Gas, Venous - [[id number]]  (Abnormal) Collected: 08/30/24 1836    Specimen: Venous Blood Updated: 08/30/24 1844     pH, Venous 7.380 pH Units      pCO2, Venous 57.5 mm Hg      pO2, Venous 34.6 mm Hg      HCO3, Venous 34.0 mmol/L      Base Excess, Venous 6.6 mmol/L      Comment: Serial Number: [id number]:  [id number]        O2 Saturation, Venous 63.4 %      Hemoglobin, Blood Gas 16.5 g/dL      Barometric Pressure for Blood Gas 746.6000 mmHg      Modality Room Air     FIO2 21 %      Notified Who syers    POC Lactate [[id number]]  (Abnormal) Collected: 08/30/24 1836    Specimen: Venous Blood Updated: 08/30/24 1844     Lactate 2.6 mmol/L      Comment: Serial Number: [id number]:  [id number]       POC Electrolyte Panel [[id number]]  (Abnormal) Collected: 08/30/24 1836    Specimen: Venous Blood Updated: 08/30/24 1844     Sodium 134 mmol/L      POC Potassium 3.4 mmol/L      Chloride 92 mmol/L      Ionized Calcium 1.11 mmol/L      Comment: Serial Number:  [id number]:  [id number]       POC Glucose Q1H [[id number]]  (Abnormal) Collected: 08/30/24 2039    Specimen: Blood Updated: 08/30/24 2040     Glucose 218 mg/dL      Comment: Serial Number: [id number]:  [id number]                Imaging:    No Radiology Exams Resulted Within Past 24 Hours      Differential Diagnosis and Discussion:    Weakness: Based on the patient's history, signs, and symptoms, the diffential diagnosis includes but is not limited to meningitis, stroke, sepsis, subarachnoid hemorrhage, intracranial bleeding, encephalitis, acute uti, dehydration, MS, myasthenia gravis, Guillan Martin, migraine variant, neuromuscular disorders vertigo, electrolyte imbalance, and metabolic disorders.    All labs were reviewed and interpreted by me.    MDM     Amount and/or Complexity of Data Reviewed  Clinical lab tests: reviewed  Decide to obtain previous medical records or to obtain history from someone other than the patient: yes           This patient is a 59-year-old diabetic male presenting with some weakness, elevated blood sugar and increased thirst and increased urination, but also frequent falls for the past several days at home.    Blood sugars almost 500 on arrival we are checking for DKA or hyperosmolar syndrome.    I am hydrating with IV fluids and giving him insulin and checking electrolytes.    We will check orthostatic vitals and reassess after fluid bolus and see if he can ambulate steadily here.    I do not see any obvious neurologic deficits concerning for stroke today.      Patient was noted to be significantly orthostatic with blood pressure that dropped to 68 over 40s upon standing on arrival.    I gave him 2 L of IV normal saline and some insulin here.    Blood sugar came down to 218 and his orthostatic vitals after IV fluids had normalized.    He was now up and ambulatory with nursing staff and no gait ataxia or stumbling.    He feels comfortable being discharged home and I will recommend he  follows up with PCP, hydrate orally, continues his prescribed midodrine, and avoids drinking sugary beverages and monitor his blood sugar closely.                    Patient Care Considerations:          Consultants/Shared Management Plan:        Social Determinants of Health:    Patient has presented with family members who are responsible, reliable and will ensure follow up care.      Disposition and Care Coordination:    Discharged: The patient is suitable and stable for discharge with no need for consideration of admission.    I have explained the patient´s condition, diagnoses and treatment plan based on the information available to me at this time. I have answered questions and addressed any concerns. The patient has a good  understanding of the patient´s diagnosis, condition, and treatment plan as can be expected at this point. The vital signs have been stable. The patient´s condition is stable and appropriate for discharge from the emergency department.      The patient will pursue further outpatient evaluation with the primary care physician or other designated or consulting physician as outlined in the discharge instructions. They are agreeable to this plan of care and follow-up instructions have been explained in detail. The patient has received these instructions in written format and has expressed an understanding of the discharge instructions. The patient is aware that any significant change in condition or worsening of symptoms should prompt an immediate return to this or the closest emergency department or call to 911.  I have explained discharge medications and the need for follow up with the patient/caretakers. This was also printed in the discharge instructions. Patient was discharged with the following medications and follow up:      Medication List      No changes were made to your prescriptions during this visit.      Anish Alanis MD  [address] [place] [address]  [id number]  [phone]    Call in 2 days  for a follow-up appointment       Final diagnoses:   Frequent falls   Type 2 diabetes mellitus with hyperglycemia, with long-term current use of insulin   Orthostatic hypotension        ED Disposition       ED Disposition   Discharge    Condition   Stable    Comment   --               This medical record created using voice recognition software.             Roe Wang MD  08/30/24 4812

## 2024-08-30 NOTE — TELEPHONE ENCOUNTER
Caller: MARIZA CAROLINA    Relationship:     Best call back number: [phone] OR CALL PATIENTS MOTHER [phone]    What is the best time to reach you: ANY    Who are you requesting to speak with (clinical staff, provider,  specific staff member): ANY    What was the call regarding: MEDICATION PATIENT WAS GIVEN IS NOT WORKING.  HE IS STILL FALLING DOWN HAS NOT BEEN EATING WELL THE LAST 2 DAYS

## 2024-08-30 NOTE — TELEPHONE ENCOUNTER
SW patient mother. Mother states she does not feel midodrine is working. Mother states patient is still wobbly and has been having increased falling. They have not been check BP/HR. Apt made for Tuesday. Advised to do BP/Hr twice daily and bring to apt. Advised if patient got worse to go to ER

## 2024-08-31 NOTE — DISCHARGE INSTRUCTIONS
Your blood sugars have been running too high, and this can cause you to urinate a lot, which can result in dehydration and having low blood pressures when you stand up (orthostatic hypotension).    Drink plenty of fluids and make sure you are only drinking diet soda, and not sugary drinks.    Use your insulin as directed.  Take your midodrine 3 times a day.

## 2024-09-25 ENCOUNTER — TELEPHONE (OUTPATIENT)
Dept: CARDIOLOGY | Facility: CLINIC | Age: 59
End: 2024-09-25

## 2024-09-25 ENCOUNTER — OFFICE VISIT (OUTPATIENT)
Dept: CARDIOLOGY | Facility: CLINIC | Age: 59
End: 2024-09-25
Payer: MEDICARE

## 2024-09-25 VITALS
WEIGHT: 188 LBS | HEIGHT: 76 IN | HEART RATE: 67 BPM | DIASTOLIC BLOOD PRESSURE: 54 MMHG | SYSTOLIC BLOOD PRESSURE: 74 MMHG | BODY MASS INDEX: 22.89 KG/M2

## 2024-09-25 DIAGNOSIS — E78.2 MIXED HYPERLIPIDEMIA: ICD-10-CM

## 2024-09-25 DIAGNOSIS — I48.0 PAROXYSMAL ATRIAL FIBRILLATION: ICD-10-CM

## 2024-09-25 DIAGNOSIS — I95.89 CHRONIC HYPOTENSION: Primary | ICD-10-CM

## 2024-09-25 PROCEDURE — 1160F RVW MEDS BY RX/DR IN RCRD: CPT | Performed by: INTERNAL MEDICINE

## 2024-09-25 PROCEDURE — 99214 OFFICE O/P EST MOD 30 MIN: CPT | Performed by: INTERNAL MEDICINE

## 2024-09-25 PROCEDURE — 1159F MED LIST DOCD IN RCRD: CPT | Performed by: INTERNAL MEDICINE

## 2024-09-25 RX ORDER — FUROSEMIDE 40 MG
40 TABLET ORAL AS NEEDED
Status: SHIPPED | COMMUNITY
Start: 2024-09-25

## 2024-09-25 RX ORDER — DROXIDOPA 100 MG/1
100 CAPSULE ORAL 3 TIMES DAILY
Qty: 90 CAPSULE | Refills: 4 | Status: SHIPPED | OUTPATIENT
Start: 2024-09-25

## 2024-09-26 ENCOUNTER — TELEPHONE (OUTPATIENT)
Dept: CARDIOLOGY | Facility: CLINIC | Age: 59
End: 2024-09-26
Payer: MEDICARE

## 2024-11-07 ENCOUNTER — OFFICE VISIT (OUTPATIENT)
Dept: CARDIOLOGY | Facility: CLINIC | Age: 59
End: 2024-11-07
Payer: MEDICARE

## 2024-11-07 VITALS
DIASTOLIC BLOOD PRESSURE: 66 MMHG | HEIGHT: 76 IN | WEIGHT: 216 LBS | SYSTOLIC BLOOD PRESSURE: 100 MMHG | BODY MASS INDEX: 26.3 KG/M2 | HEART RATE: 117 BPM

## 2024-11-07 DIAGNOSIS — E78.2 MIXED HYPERLIPIDEMIA: ICD-10-CM

## 2024-11-07 DIAGNOSIS — I48.0 PAROXYSMAL ATRIAL FIBRILLATION: Primary | ICD-10-CM

## 2024-11-07 DIAGNOSIS — I95.89 CHRONIC HYPOTENSION: ICD-10-CM

## 2024-11-07 RX ORDER — SOTALOL HYDROCHLORIDE 120 MG/1
120 TABLET ORAL EVERY 12 HOURS SCHEDULED
Qty: 180 TABLET | Refills: 3 | Status: SHIPPED | OUTPATIENT
Start: 2024-11-07

## 2024-11-07 NOTE — PROGRESS NOTES
Chief Complaint  Follow-up (6 week, no concerns), Hypertension, Atrial Fibrillation, and Hyperlipidemia    Subjective            Fantasma Mcintosh presents to [facility] CARDIOLOGY  History of Present Illness    Mr. Mcintosh is here for follow-up evaluation management of paroxysmal atrial fibrillation status post cardioversion, chronic hypotension.  He appears to be back in atrial fibrillation today.  He is feeling okay however.  His blood pressure is better after starting Northera.    PMH  Past Medical History:   Diagnosis Date    Atrial fibrillation     Coronary artery disease     Diabetes mellitus     Disease of thyroid gland     Hypotension          SURGICALHX  History reviewed. No pertinent surgical history.     SOC  Social History     Socioeconomic History    Marital status:    Tobacco Use    Smoking status: Every Day     Current packs/day: 1.00     Average packs/day: 1 pack/day for 15.0 years (15.0 ttl pk-yrs)     Types: Cigarettes    Smokeless tobacco: Never   Vaping Use    Vaping status: Never Used   Substance and Sexual Activity    Alcohol use: Not Currently    Drug use: Not Currently         FAMHX  History reviewed. No pertinent family history.       ALLERGY  Allergies   Allergen Reactions    Penicillins Itching        MEDSCURRENT    Current Outpatient Medications:     albuterol sulfate  (90 Base) MCG/ACT inhaler, Inhale 2 puffs Every 4 (Four) Hours As Needed for Wheezing or Shortness of Air., Disp: , Rfl:     aspirin 81 MG EC tablet, Take 1 tablet by mouth Daily., Disp: , Rfl:     Breztri Aerosphere 160-9-4.8 MCG/ACT aerosol inhaler, , Disp: , Rfl:     budesonide (PULMICORT) 0.5 MG/2ML nebulizer solution, Take 2 mL by nebulization Daily., Disp: , Rfl:     cefdinir (OMNICEF) 300 MG capsule, Take 1 capsule by mouth 2 (Two) Times a Day., Disp: , Rfl:     Cholecalciferol 25 MCG (1000 UT) tablet, Take 2 tablets by mouth Daily., Disp: , Rfl:     Droxidopa (Northera) 100 MG capsule,  "Take 1 capsule by mouth 3 (Three) Times a Day., Disp: 90 capsule, Rfl: 4    escitalopram (LEXAPRO) 20 MG tablet, Take 1 tablet by mouth Daily., Disp: , Rfl:     furosemide (LASIX) 40 MG tablet, Take 1 tablet by mouth As Needed (swelling)., Disp: , Rfl:     gabapentin (NEURONTIN) 600 MG tablet, Take 1 tablet by mouth 2 (Two) Times a Day., Disp: , Rfl:     Insulin Disposable Pump (V-Go 40) 40 UNIT/24HR kit, Use. Type of Insulin: Fiasp 100u/ml Type of Pump:Other: V-GO 40 DISPOSABLE Bolus amount: up to 36 units in 2-unit increments  ( 1 click=2 units) Basal amount : 40 units/24hr (1.67U/hr) basal rate and up to 36 units of on-demand bolus in 2-unit increments Correction factor: ? Insulin to carbohydrate ratio: ?  Date of last site change: ? Location of catheter:Right upper arm  Frequency of refill: every 24 hours per patient Last refill date: 05/21/2024 PM changes every PM per patient  Prescriber :  Leslie Delgado Phone number [phone], Disp: , Rfl:     Insulin Syringe 31G X 5/16\" 1 ML misc, Use 1 syringe 3 times a day., Disp: 100 each, Rfl: 0    Jardiance 10 MG tablet tablet, Take 1 tablet by mouth Daily., Disp: , Rfl:     Lantus SoloStar 100 UNIT/ML injection pen, , Disp: , Rfl:     levothyroxine (SYNTHROID, LEVOTHROID) 50 MCG tablet, Take 1 tablet by mouth 6 (Six) Times a Week. 50 mcg daily except on Sunday take 100 mcg, Disp: , Rfl:     levothyroxine (SYNTHROID, LEVOTHROID) 50 MCG tablet, Take 2 tablets by mouth Every 7 (Seven) Days. Take 50 mcg daily except on Sunday take 100 mcg, Disp: , Rfl:     loratadine (CLARITIN) 10 MG tablet, Take 1 tablet by mouth Daily., Disp: , Rfl:     Omega-3 Fatty Acids (fish oil) 1200 MG capsule capsule, Take 1 capsule by mouth 2 (Two) Times a Day., Disp: , Rfl:     Ozempic, 0.25 or 0.5 MG/DOSE, 2 MG/3ML solution pen-injector, , Disp: , Rfl:     rosuvastatin (CRESTOR) 20 MG tablet, Take 1 tablet by mouth every night at bedtime., Disp: , Rfl:     sotalol (BETAPACE) 120 MG " "tablet, Take 1 tablet by mouth Every 12 (Twelve) Hours., Disp: 180 tablet, Rfl: 3    spironolactone (ALDACTONE) 25 MG tablet, Take 1 tablet by mouth Daily., Disp: , Rfl:     Xarelto 20 MG tablet, Take 1 tablet by mouth Daily., Disp: , Rfl:     Insulin Lispro (HumaLOG) 100 UNIT/ML injection, Inject 4 Units under the skin into the appropriate area as directed 3 (Three) Times a Day Before Meals for 25 days. Do not use humalog insulin if you are using your insulin pump, Disp: 3 mL, Rfl: 0      Review of Systems   Cardiovascular:  Negative for chest pain, dyspnea on exertion, irregular heartbeat, palpitations and syncope.   Hematologic/Lymphatic: Negative for bleeding problem.        Objective     /66   Pulse 117   Ht 193 cm (76\")   Wt 98 kg (216 lb)   BMI 26.29 kg/m²       General Appearance:   well developed  well nourished  HENT:   oropharynx moist  lips not cyanotic  Neck:  thyroid not enlarged  supple  Respiratory:  no respiratory distress  normal breath sounds  no rales  Cardiovascular:  no jugular venous distention  Irregularly irregular, mildly rapid rate  Apical impulse normal  S1 normal, S2 normal  no S3, no S4   no murmur  no rub, no thrill  carotid pulses normal; no bruit  pedal pulses normal  lower extremity edema: none    Musculoskeletal:  no clubbing of fingers.   normocephalic, head atraumatic  Skin:   warm, dry  Psychiatric:  judgement and insight appropriate  normal mood and affect      Result Review :     The following data was reviewed by: CARLOS Leggett on 11/07/2024:    CMP          5/28/2024    02:51 5/29/2024    05:02 8/30/2024    17:24   CMP   Glucose 135  152  472    BUN 24  19  12    Creatinine 0.77  0.87  1.21    EGFR 103.8  100.0  69.4    Sodium 136  135  133    Potassium 3.7  4.3  3.8    Chloride 99  99  91    Calcium 8.4  8.8  9.4    Total Protein 6.7  6.4  8.4    Albumin 3.5  3.5  3.8    Globulin 3.2   4.6    Total Bilirubin 0.4  0.4  0.7    Alkaline Phosphatase 73  " 83  140    AST (SGOT) 24  59  42    ALT (SGPT) 35  57  44    Albumin/Globulin Ratio 1.1   0.8    BUN/Creatinine Ratio 31.2  21.8  9.9    Anion Gap 10.0  9.6  16.1      CBC          5/28/2024    02:51 5/29/2024    05:02 8/30/2024    17:24   CBC   WBC 8.06  9.60  11.97    RBC 4.43  4.45  4.93    Hemoglobin 12.3  12.2  14.1    Hematocrit 36.5  37.6  41.4    MCV 82.4  84.5  84.0    MCH 27.8  27.4  28.6    MCHC 33.7  32.4  34.1    RDW 15.5  15.6  12.1    Platelets 216  222  259                 Procedures      Fantasma Mcintosh  reports that he has been smoking cigarettes. He has a 15 pack-year smoking history. He has never used smokeless tobacco. I have educated him on the risk of diseases from using tobacco products such as cancer, COPD, and heart disease.     I advised him to quit and he is not willing to quit.    I spent 3  minutes counseling the patient.                Assessment and Plan        ASSESSMENT:  Encounter Diagnoses   Name Primary?    Paroxysmal atrial fibrillation Yes    Chronic hypotension     Mixed hyperlipidemia          PLAN:    1.  Paroxysmal atrial fibrillation, appears to be back in atrial fibrillation today with mildly rapid rate.  Increase sotalol to 120 mg twice daily.  Will bring him back next week for an EKG.  2.  Chronic hypotension, improved since starting Northera.  Continue 100 mg 3 times daily  3.  Mixed hyperlipidemia stable on statin therapy, continue.    Follow-up arranged.      Patient was given instructions and counseling regarding his condition or for health maintenance advice. Please see specific information pulled into the AVS if appropriate.           Nayeli Rivera, APRN   11/7/2024  11:46 EST

## 2024-11-12 ENCOUNTER — OFFICE VISIT (OUTPATIENT)
Dept: CARDIOLOGY | Facility: CLINIC | Age: 59
End: 2024-11-12
Payer: MEDICARE

## 2024-11-12 VITALS
HEIGHT: 76 IN | WEIGHT: 217 LBS | DIASTOLIC BLOOD PRESSURE: 73 MMHG | BODY MASS INDEX: 26.42 KG/M2 | HEART RATE: 116 BPM | SYSTOLIC BLOOD PRESSURE: 99 MMHG

## 2024-11-12 DIAGNOSIS — I95.89 CHRONIC HYPOTENSION: ICD-10-CM

## 2024-11-12 DIAGNOSIS — E78.2 MIXED HYPERLIPIDEMIA: ICD-10-CM

## 2024-11-12 DIAGNOSIS — I48.0 PAROXYSMAL ATRIAL FIBRILLATION: Primary | ICD-10-CM

## 2024-11-12 DIAGNOSIS — I51.9 LV DYSFUNCTION: ICD-10-CM

## 2024-11-12 RX ORDER — DIGOXIN 125 MCG
125 TABLET ORAL DAILY
Qty: 90 TABLET | Refills: 3 | Status: SHIPPED | OUTPATIENT
Start: 2024-11-12

## 2024-11-12 NOTE — PROGRESS NOTES
Chief Complaint  Follow-up, Hypertension, and Hyperlipidemia    Subjective            Fantasma Mcintosh presents to [facility] CARDIOLOGY  History of Present Illness    Mr. Mcintosh is here for follow-up evaluation and management of paroxysmal atrial fibrillation status post cardioversion, chronic hypotension.  I saw him last week, his EKG showed a reoccurrence of atrial fibrillation.  I increased his sotalol at that time, repeat EKG today shows continued atrial fibrillation rate 117 with a prolonged QT interval.  He is relatively asymptomatic today A-fib currently.  He is feeling much better overall on Northera.     PMH  Past Medical History:   Diagnosis Date    Atrial fibrillation     Coronary artery disease     Diabetes mellitus     Disease of thyroid gland     Hypotension          SURGICALHX  History reviewed. No pertinent surgical history.     SOC  Social History     Socioeconomic History    Marital status:    Tobacco Use    Smoking status: Every Day     Current packs/day: 1.00     Average packs/day: 1 pack/day for 15.0 years (15.0 ttl pk-yrs)     Types: Cigarettes    Smokeless tobacco: Never   Vaping Use    Vaping status: Never Used   Substance and Sexual Activity    Alcohol use: Not Currently    Drug use: Not Currently         FAMHX  History reviewed. No pertinent family history.       ALLERGY  Allergies   Allergen Reactions    Penicillins Itching        MEDSCURRENT    Current Outpatient Medications:     albuterol sulfate  (90 Base) MCG/ACT inhaler, Inhale 2 puffs Every 4 (Four) Hours As Needed for Wheezing or Shortness of Air., Disp: , Rfl:     aspirin 81 MG EC tablet, Take 1 tablet by mouth Daily., Disp: , Rfl:     Breztri Aerosphere 160-9-4.8 MCG/ACT aerosol inhaler, , Disp: , Rfl:     budesonide (PULMICORT) 0.5 MG/2ML nebulizer solution, Take 2 mL by nebulization Daily., Disp: , Rfl:     cefdinir (OMNICEF) 300 MG capsule, Take 1 capsule by mouth 2 (Two) Times a Day., Disp: , Rfl:  "    Cholecalciferol 25 MCG (1000 UT) tablet, Take 2 tablets by mouth Daily., Disp: , Rfl:     Droxidopa (Northera) 100 MG capsule, Take 1 capsule by mouth 3 (Three) Times a Day., Disp: 90 capsule, Rfl: 4    escitalopram (LEXAPRO) 20 MG tablet, Take 1 tablet by mouth Daily., Disp: , Rfl:     furosemide (LASIX) 40 MG tablet, Take 1 tablet by mouth As Needed (swelling)., Disp: , Rfl:     gabapentin (NEURONTIN) 600 MG tablet, Take 1 tablet by mouth 2 (Two) Times a Day., Disp: , Rfl:     Insulin Disposable Pump (V-Go 40) 40 UNIT/24HR kit, Use. Type of Insulin: Fiasp 100u/ml Type of Pump:Other: V-GO 40 DISPOSABLE Bolus amount: up to 36 units in 2-unit increments  ( 1 click=2 units) Basal amount : 40 units/24hr (1.67U/hr) basal rate and up to 36 units of on-demand bolus in 2-unit increments Correction factor: ? Insulin to carbohydrate ratio: ?  Date of last site change: ? Location of catheter:Right upper arm  Frequency of refill: every 24 hours per patient Last refill date: 05/21/2024 PM changes every PM per patient  Prescriber :  Dr Cunningham [facility] Phone number [phone], Disp: , Rfl:     Insulin Syringe 31G X 5/16\" 1 ML misc, Use 1 syringe 3 times a day., Disp: 100 each, Rfl: 0    Jardiance 10 MG tablet tablet, Take 1 tablet by mouth Daily., Disp: , Rfl:     Lantus SoloStar 100 UNIT/ML injection pen, , Disp: , Rfl:     levothyroxine (SYNTHROID, LEVOTHROID) 50 MCG tablet, Take 1 tablet by mouth 6 (Six) Times a Week. 50 mcg daily except on Sunday take 100 mcg, Disp: , Rfl:     loratadine (CLARITIN) 10 MG tablet, Take 1 tablet by mouth Daily., Disp: , Rfl:     Omega-3 Fatty Acids (fish oil) 1200 MG capsule capsule, Take 1 capsule by mouth 2 (Two) Times a Day., Disp: , Rfl:     Ozempic, 0.25 or 0.5 MG/DOSE, 2 MG/3ML solution pen-injector, , Disp: , Rfl:     rosuvastatin (CRESTOR) 20 MG tablet, Take 1 tablet by mouth every night at bedtime., Disp: , Rfl:     spironolactone (ALDACTONE) 25 MG tablet, Take 1 tablet by mouth " "Daily., Disp: , Rfl:     Xarelto 20 MG tablet, Take 1 tablet by mouth Daily., Disp: , Rfl:     digoxin (LANOXIN) 125 MCG tablet, Take 1 tablet by mouth Daily., Disp: 90 tablet, Rfl: 3    Insulin Lispro (HumaLOG) 100 UNIT/ML injection, Inject 4 Units under the skin into the appropriate area as directed 3 (Three) Times a Day Before Meals for 25 days. Do not use humalog insulin if you are using your insulin pump, Disp: 3 mL, Rfl: 0    levothyroxine (SYNTHROID, LEVOTHROID) 50 MCG tablet, Take 2 tablets by mouth Every 7 (Seven) Days. Take 50 mcg daily except on Sunday take 100 mcg (Patient not taking: Reported on 11/12/2024), Disp: , Rfl:       Review of Systems   Cardiovascular:  Negative for chest pain, dyspnea on exertion, irregular heartbeat, palpitations and syncope.        Objective     BP 99/73   Pulse 116   Ht 193 cm (76\")   Wt 98.4 kg (217 lb)   BMI 26.41 kg/m²       General Appearance:   well developed  well nourished  HENT:   oropharynx moist  lips not cyanotic  Neck:  thyroid not enlarged  supple  Respiratory:  no respiratory distress  normal breath sounds  no rales  Cardiovascular:  no jugular venous distention  Irregularly irregular, mildly tachycardic  apical impulse normal  S1 normal, S2 normal  no S3, no S4   no murmur  no rub, no thrill  carotid pulses normal; no bruit  pedal pulses normal  lower extremity edema: none    Musculoskeletal:  no clubbing of fingers.   normocephalic, head atraumatic  Skin:   warm, dry  Psychiatric:  judgement and insight appropriate  normal mood and affect      Result Review :     The following data was reviewed by: CARLOS Leggett on 11/12/2024:    CMP          5/28/2024    02:51 5/29/2024    05:02 8/30/2024    17:24   CMP   Glucose 135  152  472    BUN 24  19  12    Creatinine 0.77  0.87  1.21    EGFR 103.8  100.0  69.4    Sodium 136  135  133    Potassium 3.7  4.3  3.8    Chloride 99  99  91    Calcium 8.4  8.8  9.4    Total Protein 6.7  6.4  8.4  "   Albumin 3.5  3.5  3.8    Globulin 3.2   4.6    Total Bilirubin 0.4  0.4  0.7    Alkaline Phosphatase 73  83  140    AST (SGOT) 24  59  42    ALT (SGPT) 35  57  44    Albumin/Globulin Ratio 1.1   0.8    BUN/Creatinine Ratio 31.2  21.8  9.9    Anion Gap 10.0  9.6  16.1      CBC          5/28/2024    02:51 5/29/2024    05:02 8/30/2024    17:24   CBC   WBC 8.06  9.60  11.97    RBC 4.43  4.45  4.93    Hemoglobin 12.3  12.2  14.1    Hematocrit 36.5  37.6  41.4    MCV 82.4  84.5  84.0    MCH 27.8  27.4  28.6    MCHC 33.7  32.4  34.1    RDW 15.5  15.6  12.1    Platelets 216  222  259                   ECG 12 Lead    Date/Time: 11/12/2024 3:12 PM  Performed by: Nayeli Rivera APRN    Authorized by: TERA Andersen MD  Rhythm: atrial fibrillation  Rate: normal  Conduction: conduction normal  ST Segments: ST segments normal  T Waves: T waves normal  QRS axis: normal  Other findings: non-specific ST-T wave changes and prolonged QTc interval    Clinical impression: abnormal EKG            Fantasma Mcintosh  reports that he has been smoking cigarettes. He has a 15 pack-year smoking history. He has never used smokeless tobacco. I have educated him on the risk of diseases from using tobacco products such as cancer, COPD, and heart disease.     I advised him to quit and he is not willing to quit.    I spent 3  minutes counseling the patient.                Assessment and Plan        ASSESSMENT:  Encounter Diagnoses   Name Primary?    Paroxysmal atrial fibrillation Yes    Chronic hypotension     LV dysfunction     Mixed hyperlipidemia          PLAN:    1.  Paroxysmal atrial fibrillation, has had a reoccurrence of atrial fibrillation status post cardioversion.  Will discontinue sotalol as this is not keeping him in a normal rhythm and he also has prolonged QT interval on this EKG today.  Will start digoxin for better rate control.  Continue anticoagulation.  As long as patient remains asymptomatic, we can pursue a rate control  strategy.  If he is symptomatic would consider EP referral for possible ablation.  He is agreeable to this plan.  2.  Chronic hypotension, stabilized on Northera, continue.  3.  LV dysfunction, volume status stable.  4.  Mixed hyperlipidemia stable on statin therapy.    Follow-up in 4 weeks      Patient was given instructions and counseling regarding his condition or for health maintenance advice. Please see specific information pulled into the AVS if appropriate.           Nayeli Rivera, APRN   11/12/2024  15:11 EST

## 2024-12-10 ENCOUNTER — OFFICE VISIT (OUTPATIENT)
Dept: CARDIOLOGY | Facility: CLINIC | Age: 59
End: 2024-12-10
Payer: MEDICARE

## 2024-12-10 VITALS
HEART RATE: 119 BPM | HEIGHT: 76 IN | WEIGHT: 222 LBS | DIASTOLIC BLOOD PRESSURE: 81 MMHG | BODY MASS INDEX: 27.03 KG/M2 | SYSTOLIC BLOOD PRESSURE: 115 MMHG

## 2024-12-10 DIAGNOSIS — I48.0 PAROXYSMAL ATRIAL FIBRILLATION: Primary | ICD-10-CM

## 2024-12-10 DIAGNOSIS — I51.9 LV DYSFUNCTION: ICD-10-CM

## 2024-12-10 DIAGNOSIS — E78.2 MIXED HYPERLIPIDEMIA: ICD-10-CM

## 2024-12-10 DIAGNOSIS — I95.89 CHRONIC HYPOTENSION: ICD-10-CM

## 2024-12-10 RX ORDER — METOPROLOL SUCCINATE 25 MG/1
25 TABLET, EXTENDED RELEASE ORAL DAILY
Qty: 90 TABLET | Refills: 3 | Status: SHIPPED | OUTPATIENT
Start: 2024-12-10

## 2024-12-10 NOTE — PROGRESS NOTES
Chief Complaint  Follow-up    Subjective            Fantasma Mcintosh presents to [facility] CARDIOLOGY  History of Present Illness    Mr. Mcintosh is here for follow-up evaluation management of paroxysmal atrial fibrillation status post cardioversion, chronic hypotension, mild LV dysfunction.  Recently in the office his EKG showed recurrence of atrial fibrillation.  His EKG also showed a prolonged QT interval.  At that point his sotalol was stopped, digoxin started.  Today he is asymptomatic.  He denies chest pain, excessive shortness of breath, or palpitations.  No bleeding problems on anticoagulation.  He is also recently started Northera which has made him feel a lot better.    PMH  Past Medical History:   Diagnosis Date    Atrial fibrillation     Coronary artery disease     Diabetes mellitus     Disease of thyroid gland     Hypotension          SURGICALHX  History reviewed. No pertinent surgical history.     SOC  Social History     Socioeconomic History    Marital status:    Tobacco Use    Smoking status: Every Day     Current packs/day: 1.00     Average packs/day: 1 pack/day for 15.0 years (15.0 ttl pk-yrs)     Types: Cigarettes    Smokeless tobacco: Never   Vaping Use    Vaping status: Never Used   Substance and Sexual Activity    Alcohol use: Not Currently    Drug use: Not Currently         FAMHX  History reviewed. No pertinent family history.       ALLERGY  Allergies   Allergen Reactions    Penicillins Itching        MEDSCURRENT    Current Outpatient Medications:     albuterol sulfate  (90 Base) MCG/ACT inhaler, Inhale 2 puffs Every 4 (Four) Hours As Needed for Wheezing or Shortness of Air., Disp: , Rfl:     aspirin 81 MG EC tablet, Take 1 tablet by mouth Daily., Disp: , Rfl:     Breztri Aerosphere 160-9-4.8 MCG/ACT aerosol inhaler, , Disp: , Rfl:     budesonide (PULMICORT) 0.5 MG/2ML nebulizer solution, Take 2 mL by nebulization Daily., Disp: , Rfl:     cefdinir (OMNICEF) 300 MG  "capsule, Take 1 capsule by mouth 2 (Two) Times a Day., Disp: , Rfl:     Cholecalciferol 25 MCG (1000 UT) tablet, Take 2 tablets by mouth Daily., Disp: , Rfl:     digoxin (LANOXIN) 125 MCG tablet, Take 1 tablet by mouth Daily., Disp: 90 tablet, Rfl: 3    Droxidopa (Northera) 100 MG capsule, Take 1 capsule by mouth 3 (Three) Times a Day., Disp: 90 capsule, Rfl: 4    escitalopram (LEXAPRO) 20 MG tablet, Take 1 tablet by mouth Daily., Disp: , Rfl:     furosemide (LASIX) 40 MG tablet, Take 1 tablet by mouth As Needed (swelling)., Disp: , Rfl:     gabapentin (NEURONTIN) 600 MG tablet, Take 1 tablet by mouth 2 (Two) Times a Day., Disp: , Rfl:     Insulin Disposable Pump (V-Go 40) 40 UNIT/24HR kit, Use. Type of Insulin: Fiasp 100u/ml Type of Pump:Other: V-GO 40 DISPOSABLE Bolus amount: up to 36 units in 2-unit increments  ( 1 click=2 units) Basal amount : 40 units/24hr (1.67U/hr) basal rate and up to 36 units of on-demand bolus in 2-unit increments Correction factor: ? Insulin to carbohydrate ratio: ?  Date of last site change: ? Location of catheter:Right upper arm  Frequency of refill: every 24 hours per patient Last refill date: 05/21/2024 PM changes every PM per patient  Prescriber :  Leslie Delgado Phone number [phone], Disp: , Rfl:     Insulin Syringe 31G X 5/16\" 1 ML misc, Use 1 syringe 3 times a day., Disp: 100 each, Rfl: 0    Jardiance 10 MG tablet tablet, Take 1 tablet by mouth Daily., Disp: , Rfl:     Lantus SoloStar 100 UNIT/ML injection pen, , Disp: , Rfl:     levothyroxine (SYNTHROID, LEVOTHROID) 50 MCG tablet, Take 1 tablet by mouth 6 (Six) Times a Week. 50 mcg daily except on Sunday take 100 mcg, Disp: , Rfl:     levothyroxine (SYNTHROID, LEVOTHROID) 50 MCG tablet, Take 2 tablets by mouth Every 7 (Seven) Days. Take 50 mcg daily except on Sunday take 100 mcg, Disp: , Rfl:     loratadine (CLARITIN) 10 MG tablet, Take 1 tablet by mouth Daily., Disp: , Rfl:     Omega-3 Fatty Acids (fish oil) 1200 MG " "capsule capsule, Take 1 capsule by mouth 2 (Two) Times a Day., Disp: , Rfl:     Ozempic, 0.25 or 0.5 MG/DOSE, 2 MG/3ML solution pen-injector, , Disp: , Rfl:     rosuvastatin (CRESTOR) 20 MG tablet, Take 1 tablet by mouth every night at bedtime., Disp: , Rfl:     spironolactone (ALDACTONE) 25 MG tablet, Take 1 tablet by mouth Daily., Disp: , Rfl:     Xarelto 20 MG tablet, Take 1 tablet by mouth Daily., Disp: , Rfl:     Insulin Lispro (HumaLOG) 100 UNIT/ML injection, Inject 4 Units under the skin into the appropriate area as directed 3 (Three) Times a Day Before Meals for 25 days. Do not use humalog insulin if you are using your insulin pump, Disp: 3 mL, Rfl: 0    metoprolol succinate XL (TOPROL-XL) 25 MG 24 hr tablet, Take 1 tablet by mouth Daily., Disp: 90 tablet, Rfl: 3      Review of Systems   Cardiovascular:  Negative for chest pain, dyspnea on exertion, palpitations and syncope.        Objective     /81   Pulse 119   Ht 193 cm (76\")   Wt 101 kg (222 lb)   BMI 27.02 kg/m²       General Appearance:   well developed  well nourished  HENT:   oropharynx moist  lips not cyanotic  Neck:  thyroid not enlarged  supple  Respiratory:  no respiratory distress  normal breath sounds  no rales  Cardiovascular:  no jugular venous distention  Irregularly irregular, mildly rapid rate  apical impulse normal  S1 normal, S2 normal  no S3, no S4   no murmur  no rub, no thrill  carotid pulses normal; no bruit  pedal pulses normal  lower extremity edema: none    Musculoskeletal:  no clubbing of fingers.   normocephalic, head atraumatic  Skin:   warm, dry  Psychiatric:  judgement and insight appropriate  normal mood and affect      Result Review :     The following data was reviewed by: CARLOS Leggett on 12/10/2024:    CMP          5/28/2024    02:51 5/29/2024    05:02 8/30/2024    17:24   CMP   Glucose 135  152  472    BUN 24  19  12    Creatinine 0.77  0.87  1.21    EGFR 103.8  100.0  69.4    Sodium 136  135  " 133    Potassium 3.7  4.3  3.8    Chloride 99  99  91    Calcium 8.4  8.8  9.4    Total Protein 6.7  6.4  8.4    Albumin 3.5  3.5  3.8    Globulin 3.2   4.6    Total Bilirubin 0.4  0.4  0.7    Alkaline Phosphatase 73  83  140    AST (SGOT) 24  59  42    ALT (SGPT) 35  57  44    Albumin/Globulin Ratio 1.1   0.8    BUN/Creatinine Ratio 31.2  21.8  9.9    Anion Gap 10.0  9.6  16.1      CBC          5/28/2024    02:51 5/29/2024    05:02 8/30/2024    17:24   CBC   WBC 8.06  9.60  11.97    RBC 4.43  4.45  4.93    Hemoglobin 12.3  12.2  14.1    Hematocrit 36.5  37.6  41.4    MCV 82.4  84.5  84.0    MCH 27.8  27.4  28.6    MCHC 33.7  32.4  34.1    RDW 15.5  15.6  12.1    Platelets 216  222  259                 Procedures      Fantasma Mcintosh  reports that he has been smoking cigarettes. He has a 15 pack-year smoking history. He has never used smokeless tobacco. I have educated him on the risk of diseases from using tobacco products such as cancer, COPD, and heart disease.     I advised him to quit and he is not willing to quit.    I spent 3  minutes counseling the patient.                Assessment and Plan        ASSESSMENT:  Encounter Diagnoses   Name Primary?    Paroxysmal atrial fibrillation Yes    Chronic hypotension     LV dysfunction     Mixed hyperlipidemia          PLAN:    1.  Paroxysmal atrial fibrillation status post cardioversion, this is seemingly more persistent at this point.  He is still mildly tachycardic on the digoxin.  Due to his chronic hypotension, previous beta-blocker was discontinued.  Will trial a low-dose of metoprolol, will have him keep a close watch on his blood pressure at home and he will let me know if he has any hypotension.  Continue anticoagulation.  He is asymptomatic to the A-fib so we will pursue a rate control strategy for now.  2.  Chronic hypotension, stabilized on Northera.  Continue to monitor at home.  3.  LV dysfunction, volume status stable.  Continue Aldactone, Jardiance.   Starting metoprolol as above.  4.  Mixed hyperlipidemia stable on statin therapy, continue.    Follow-up arranged.    Patient was given instructions and counseling regarding his condition or for health maintenance advice. Please see specific information pulled into the AVS if appropriate.           Nayeli Rivera, APRN   12/10/2024  13:46 EST

## 2025-01-02 ENCOUNTER — TELEPHONE (OUTPATIENT)
Dept: CARDIOLOGY | Facility: CLINIC | Age: 60
End: 2025-01-02
Payer: MEDICARE

## 2025-01-02 NOTE — TELEPHONE ENCOUNTER
The [facility] received a fax that requires your attention. The document has been indexed to the patient’s chart for your review.      Reason for sending: EXTERNAL MEDICAL RECORD NOTIFICATION     Documents Description: DROXIDOPA PA-[employer]-1.2.25    Name of Sender: [employer]     Date Indexed: 1.2.25

## 2025-01-02 NOTE — TELEPHONE ENCOUNTER
Approved today by [employer]    Your request has been approved    Authorization Expiration Date: 4/2/2025    APPROVAL LETTER RECEIVED AND SCANNED IN MEDIA.

## 2025-01-10 ENCOUNTER — TELEPHONE (OUTPATIENT)
Dept: CARDIOLOGY | Facility: CLINIC | Age: 60
End: 2025-01-10
Payer: MEDICARE

## 2025-01-10 NOTE — TELEPHONE ENCOUNTER
The [facility] received a fax that requires your attention. The document has been indexed to the patient’s chart for your review.      Reason for sending: EXTERNAL MEDICAL RECORD NOTIFICATION     Documents Description: DROXIDOPA REFILL-[employer]-1.10.25    Name of Sender: [employer]     Date Indexed: 1.10.25    RENEWAL REQUEST

## 2025-01-13 RX ORDER — DROXIDOPA 100 MG/1
100 CAPSULE ORAL 3 TIMES DAILY
Qty: 90 CAPSULE | Refills: 4 | Status: SHIPPED | OUTPATIENT
Start: 2025-01-13

## 2025-04-22 ENCOUNTER — TELEPHONE (OUTPATIENT)
Dept: CARDIOLOGY | Facility: CLINIC | Age: 60
End: 2025-04-22
Payer: MEDICARE

## 2025-04-22 RX ORDER — DROXIDOPA 100 MG/1
100 CAPSULE ORAL 3 TIMES DAILY
Qty: 90 CAPSULE | Refills: 4 | Status: CANCELLED | OUTPATIENT
Start: 2025-04-22

## 2025-04-22 NOTE — TELEPHONE ENCOUNTER
PA INITIATED FOR DROXIDOPA, KEY [id number].    PA WAS DENIED:  You answered no to the following questions  Does the patient have primary autonomic failure due to Parkinson's disease, multiple system atrophy, or pure autonomic failure?   Does the patient have dopamine beta-hydroxylase deficiency?   Does the patient have non-diabetic autonomic neuropathy?     I FILED AN APPEAL BASED ON INFORMATION IN PREVIOUS PA THAT STATES THIS:    Pt has diagnosis of neurogenic orthostatic hypotension, his blood pressure does drop with standing, and pt has Pure autonomic failure per CARLOS Junior.     AWAITING DECISION

## 2025-04-28 ENCOUNTER — OFFICE VISIT (OUTPATIENT)
Dept: CARDIOLOGY | Facility: CLINIC | Age: 60
End: 2025-04-28
Payer: MEDICARE

## 2025-04-28 VITALS
WEIGHT: 204.8 LBS | HEART RATE: 108 BPM | HEIGHT: 76 IN | BODY MASS INDEX: 24.94 KG/M2 | DIASTOLIC BLOOD PRESSURE: 81 MMHG | SYSTOLIC BLOOD PRESSURE: 116 MMHG | OXYGEN SATURATION: 93 %

## 2025-04-28 DIAGNOSIS — E78.2 MIXED HYPERLIPIDEMIA: ICD-10-CM

## 2025-04-28 DIAGNOSIS — I48.11 LONGSTANDING PERSISTENT ATRIAL FIBRILLATION: Primary | ICD-10-CM

## 2025-04-28 DIAGNOSIS — I95.89 CHRONIC HYPOTENSION: ICD-10-CM

## 2025-04-28 PROCEDURE — 1160F RVW MEDS BY RX/DR IN RCRD: CPT | Performed by: INTERNAL MEDICINE

## 2025-04-28 PROCEDURE — 99214 OFFICE O/P EST MOD 30 MIN: CPT | Performed by: INTERNAL MEDICINE

## 2025-04-28 PROCEDURE — 1159F MED LIST DOCD IN RCRD: CPT | Performed by: INTERNAL MEDICINE

## 2025-04-28 NOTE — PROGRESS NOTES
"Chief Complaint  Atrial Fibrillation and Follow-up (4 month, feels like legs are going to come out from underneath him )    Subjective            Fantasma Mcintosh presents to [facility] CARDIOLOGY      Mr. Mcintosh is here for follow-up evaluation management of paroxysmal atrial fibrillation status post cardioversion, chronic hypotension, mild LV dysfunction.  Since last visit he has not had any syncopal or presyncopal episodes.  However he does report episodes where his legs \"give out\" on him.  Otherwise he is feeling fine at the time and denies any dizziness or lightheadedness.  He is compliant with his medical therapy.  He denies palpitations.  No bleeding problems on anticoagulation.    PMH  Past Medical History:   Diagnosis Date    Atrial fibrillation     Coronary artery disease     Diabetes mellitus     Disease of thyroid gland     Hypotension          SURGICALHX  History reviewed. No pertinent surgical history.     SOC  Social History     Socioeconomic History    Marital status:    Tobacco Use    Smoking status: Every Day     Current packs/day: 1.00     Average packs/day: 1 pack/day for 15.0 years (15.0 ttl pk-yrs)     Types: Cigarettes    Smokeless tobacco: Never   Vaping Use    Vaping status: Never Used   Substance and Sexual Activity    Alcohol use: Not Currently    Drug use: Not Currently         FAMHX  History reviewed. No pertinent family history.       ALLERGY  Allergies   Allergen Reactions    Penicillins Itching        MEDSCURRENT    Current Outpatient Medications:     albuterol sulfate  (90 Base) MCG/ACT inhaler, Inhale 2 puffs Every 4 (Four) Hours As Needed for Wheezing or Shortness of Air., Disp: , Rfl:     aspirin 81 MG EC tablet, Take 1 tablet by mouth Daily., Disp: , Rfl:     Breztri Aerosphere 160-9-4.8 MCG/ACT aerosol inhaler, , Disp: , Rfl:     budesonide (PULMICORT) 0.5 MG/2ML nebulizer solution, Take 2 mL by nebulization Daily., Disp: , Rfl:     cefdinir (OMNICEF) " "300 MG capsule, Take 1 capsule by mouth 2 (Two) Times a Day., Disp: , Rfl:     Cholecalciferol 25 MCG (1000 UT) tablet, Take 2 tablets by mouth Daily., Disp: , Rfl:     digoxin (LANOXIN) 125 MCG tablet, Take 1 tablet by mouth Daily., Disp: 90 tablet, Rfl: 3    Droxidopa (Northera) 100 MG capsule, Take 1 capsule by mouth 3 (Three) Times a Day., Disp: 90 capsule, Rfl: 4    escitalopram (LEXAPRO) 20 MG tablet, Take 1 tablet by mouth Daily., Disp: , Rfl:     furosemide (LASIX) 40 MG tablet, Take 1 tablet by mouth As Needed (swelling)., Disp: , Rfl:     gabapentin (NEURONTIN) 600 MG tablet, Take 1 tablet by mouth 2 (Two) Times a Day., Disp: , Rfl:     Insulin Disposable Pump (V-Go 40) 40 UNIT/24HR kit, Use. Type of Insulin: Fiasp 100u/ml Type of Pump:Other: V-GO 40 DISPOSABLE Bolus amount: up to 36 units in 2-unit increments  ( 1 click=2 units) Basal amount : 40 units/24hr (1.67U/hr) basal rate and up to 36 units of on-demand bolus in 2-unit increments Correction factor: ? Insulin to carbohydrate ratio: ?  Date of last site change: ? Location of catheter:Right upper arm  Frequency of refill: every 24 hours per patient Last refill date: 05/21/2024 PM changes every PM per patient  Prescriber :  Dr Cunningham [facility] Phone number [phone], Disp: , Rfl:     Insulin Lispro (HumaLOG) 100 UNIT/ML injection, Inject 4 Units under the skin into the appropriate area as directed 3 (Three) Times a Day Before Meals for 25 days. Do not use humalog insulin if you are using your insulin pump, Disp: 3 mL, Rfl: 0    Insulin Syringe 31G X 5/16\" 1 ML misc, Use 1 syringe 3 times a day., Disp: 100 each, Rfl: 0    Jardiance 10 MG tablet tablet, Take 1 tablet by mouth Daily., Disp: , Rfl:     Lantus SoloStar 100 UNIT/ML injection pen, , Disp: , Rfl:     levothyroxine (SYNTHROID, LEVOTHROID) 50 MCG tablet, Take 1 tablet by mouth 6 (Six) Times a Week. 50 mcg daily except on Sunday take 100 mcg, Disp: , Rfl:     levothyroxine (SYNTHROID, " "LEVOTHROID) 50 MCG tablet, Take 2 tablets by mouth Every 7 (Seven) Days. Take 50 mcg daily except on Sunday take 100 mcg, Disp: , Rfl:     loratadine (CLARITIN) 10 MG tablet, Take 1 tablet by mouth Daily., Disp: , Rfl:     metoprolol succinate XL (TOPROL-XL) 25 MG 24 hr tablet, Take 1 tablet by mouth Daily., Disp: 90 tablet, Rfl: 3    Omega-3 Fatty Acids (fish oil) 1200 MG capsule capsule, Take 1 capsule by mouth 2 (Two) Times a Day., Disp: , Rfl:     Ozempic, 0.25 or 0.5 MG/DOSE, 2 MG/3ML solution pen-injector, , Disp: , Rfl:     rosuvastatin (CRESTOR) 20 MG tablet, Take 1 tablet by mouth every night at bedtime., Disp: , Rfl:     spironolactone (ALDACTONE) 25 MG tablet, Take 1 tablet by mouth Daily., Disp: , Rfl:     Xarelto 20 MG tablet, Take 1 tablet by mouth Daily., Disp: , Rfl:       Review of Systems   Cardiovascular:  Negative for chest pain, dyspnea on exertion, palpitations and syncope.   Musculoskeletal:  Positive for muscle weakness.        Objective     /81   Pulse 108   Ht 193 cm (76\")   Wt 92.9 kg (204 lb 12.8 oz)   SpO2 93%   BMI 24.93 kg/m²       General Appearance:   well developed  well nourished  HENT:   oropharynx moist  lips not cyanotic  Neck:  thyroid not enlarged  supple  Respiratory:  no respiratory distress  normal breath sounds  no rales  Cardiovascular:  no jugular venous distention  Irregularly irregular, mildly rapid rate  apical impulse normal  S1 normal, S2 normal  no S3, no S4   no murmur  no rub, no thrill  carotid pulses normal; no bruit  pedal pulses normal  lower extremity edema: none    Musculoskeletal:  no clubbing of fingers.   normocephalic, head atraumatic  Skin:   warm, dry  Psychiatric:  judgement and insight appropriate  normal mood and affect      Result Review :     The following data was reviewed by: Santos Andersen MD on 12/10/2024:    CMP          5/28/2024    02:51 5/29/2024    05:02 8/30/2024    17:24   CMP   Glucose 135  152  472    BUN 24  " 19  12    Creatinine 0.77  0.87  1.21    EGFR 103.8  100.0  69.4    Sodium 136  135  133    Potassium 3.7  4.3  3.8    Chloride 99  99  91    Calcium 8.4  8.8  9.4    Total Protein 6.7  6.4  8.4    Albumin 3.5  3.5  3.8    Globulin 3.2   4.6    Total Bilirubin 0.4  0.4  0.7    Alkaline Phosphatase 73  83  140    AST (SGOT) 24  59  42    ALT (SGPT) 35  57  44    Albumin/Globulin Ratio 1.1   0.8    BUN/Creatinine Ratio 31.2  21.8  9.9    Anion Gap 10.0  9.6  16.1      CBC          5/28/2024    02:51 5/29/2024    05:02 8/30/2024    17:24   CBC   WBC 8.06  9.60  11.97    RBC 4.43  4.45  4.93    Hemoglobin 12.3  12.2  14.1    Hematocrit 36.5  37.6  41.4    MCV 82.4  84.5  84.0    MCH 27.8  27.4  28.6    MCHC 33.7  32.4  34.1    RDW 15.5  15.6  12.1    Platelets 216  222  259                 Procedures      Fantasma Mcintosh  reports that he has been smoking cigarettes. He has a 15 pack-year smoking history. He has never used smokeless tobacco. I have educated him on the risk of diseases from using tobacco products such as cancer, COPD, and heart disease.     I advised him to quit and he is not willing to quit.    I spent 3  minutes counseling the patient.                Assessment and Plan        ASSESSMENT:  Encounter Diagnoses   Name Primary?    Longstanding persistent atrial fibrillation Yes    Chronic hypotension     Mixed hyperlipidemia          PLAN:    1.  Longstanding persistent atrial fibrillation.  Rate is stable he is not symptomatic.  Continue anticoagulation and low-dose beta-blocker.  We are being gentle on the rate control because of his tendency to get low blood pressure.  2.  Chronic hypotension, stabilized on Northera.  Drink plenty of fluids, continue current medical therapy  3.  LV dysfunction, mild, volume status stable.  Continue Aldactone, Jardiance.  Low-dose beta-blocker.  He is not a candidate for ACE/ARB/Arni due to chronic low blood pressure/orthostasis  4.  Mixed hyperlipidemia stable on statin  therapy, continue.    Routine follow-up will be arranged    Patient was given instructions and counseling regarding his condition or for health maintenance advice. Please see specific information pulled into the AVS if appropriate.           Santos Andersen MD   4/28/2025  11:26 EDT

## 2025-05-14 ENCOUNTER — HOSPITAL ENCOUNTER (EMERGENCY)
Facility: HOSPITAL | Age: 60
Discharge: HOME | End: 2025-05-15
Attending: EMERGENCY MEDICINE
Payer: MEDICARE

## 2025-05-14 ENCOUNTER — APPOINTMENT (OUTPATIENT)
Dept: CT IMAGING | Facility: HOSPITAL | Age: 60
End: 2025-05-14
Payer: MEDICARE

## 2025-05-14 ENCOUNTER — APPOINTMENT (OUTPATIENT)
Dept: GENERAL RADIOLOGY | Facility: HOSPITAL | Age: 60
End: 2025-05-14
Payer: MEDICARE

## 2025-05-14 DIAGNOSIS — E11.65 UNCONTROLLED TYPE 2 DIABETES MELLITUS WITH HYPERGLYCEMIA: ICD-10-CM

## 2025-05-14 DIAGNOSIS — I95.1 ORTHOSTATIC HYPOTENSION: Primary | ICD-10-CM

## 2025-05-14 DIAGNOSIS — I48.91 ATRIAL FIBRILLATION, UNSPECIFIED TYPE: ICD-10-CM

## 2025-05-14 LAB
ACETONE BLD QL: NEGATIVE
ALBUMIN SERPL-MCNC: 4.1 G/DL (ref 3.5–5.2)
ALBUMIN/GLOB SERPL: 1.1 G/DL
ALP SERPL-CCNC: 221 U/L (ref 39–117)
ALT SERPL W P-5'-P-CCNC: 120 U/L (ref 1–41)
AMPHET+METHAMPHET UR QL: NEGATIVE
AMPHETAMINES UR QL: NEGATIVE
ANION GAP SERPL CALCULATED.3IONS-SCNC: 11.4 MMOL/L (ref 5–15)
AST SERPL-CCNC: 110 U/L (ref 1–40)
BARBITURATES UR QL SCN: NEGATIVE
BASOPHILS # BLD AUTO: 0.06 10*3/MM3 (ref 0–0.2)
BASOPHILS NFR BLD AUTO: 0.9 % (ref 0–1.5)
BENZODIAZ UR QL SCN: NEGATIVE
BILIRUB SERPL-MCNC: 0.6 MG/DL (ref 0–1.2)
BILIRUB UR QL STRIP: NEGATIVE
BUN SERPL-MCNC: 28 MG/DL (ref 6–20)
BUN/CREAT SERPL: 22.6 (ref 7–25)
BUPRENORPHINE SERPL-MCNC: NEGATIVE NG/ML
CALCIUM SPEC-SCNC: 9.3 MG/DL (ref 8.6–10.5)
CANNABINOIDS SERPL QL: NEGATIVE
CHLORIDE SERPL-SCNC: 92 MMOL/L (ref 98–107)
CLARITY UR: CLEAR
CO2 SERPL-SCNC: 28.6 MMOL/L (ref 22–29)
COCAINE UR QL: NEGATIVE
COLOR UR: YELLOW
CREAT SERPL-MCNC: 1.24 MG/DL (ref 0.76–1.27)
DEPRECATED RDW RBC AUTO: 46.2 FL (ref 37–54)
DIGOXIN SERPL-MCNC: 0.94 NG/ML (ref 0.6–1.2)
EGFRCR SERPLBLD CKD-EPI 2021: 67 ML/MIN/1.73
EOSINOPHIL # BLD AUTO: 0.13 10*3/MM3 (ref 0–0.4)
EOSINOPHIL NFR BLD AUTO: 1.9 % (ref 0.3–6.2)
ERYTHROCYTE [DISTWIDTH] IN BLOOD BY AUTOMATED COUNT: 15.4 % (ref 12.3–15.4)
ETHANOL BLD-MCNC: <10 MG/DL (ref 0–10)
ETHANOL UR QL: <0.01 %
FENTANYL UR-MCNC: NEGATIVE NG/ML
GEN 5 1HR TROPONIN T REFLEX: 27 NG/L
GLOBULIN UR ELPH-MCNC: 3.7 GM/DL
GLUCOSE BLDC GLUCOMTR-MCNC: 461 MG/DL (ref 70–99)
GLUCOSE SERPL-MCNC: 608 MG/DL (ref 65–99)
GLUCOSE UR STRIP-MCNC: ABNORMAL MG/DL
HCT VFR BLD AUTO: 46.2 % (ref 37.5–51)
HGB BLD-MCNC: 15.3 G/DL (ref 13–17.7)
HGB UR QL STRIP.AUTO: NEGATIVE
HOLD SPECIMEN: NORMAL
HOLD SPECIMEN: NORMAL
IMM GRANULOCYTES # BLD AUTO: 0.01 10*3/MM3 (ref 0–0.05)
IMM GRANULOCYTES NFR BLD AUTO: 0.1 % (ref 0–0.5)
KETONES UR QL STRIP: NEGATIVE
LEUKOCYTE ESTERASE UR QL STRIP.AUTO: NEGATIVE
LYMPHOCYTES # BLD AUTO: 1.38 10*3/MM3 (ref 0.7–3.1)
LYMPHOCYTES NFR BLD AUTO: 19.8 % (ref 19.6–45.3)
MAGNESIUM SERPL-MCNC: 2.3 MG/DL (ref 1.6–2.6)
MCH RBC QN AUTO: 27.3 PG (ref 26.6–33)
MCHC RBC AUTO-ENTMCNC: 33.1 G/DL (ref 31.5–35.7)
MCV RBC AUTO: 82.4 FL (ref 79–97)
METHADONE UR QL SCN: NEGATIVE
MONOCYTES # BLD AUTO: 0.57 10*3/MM3 (ref 0.1–0.9)
MONOCYTES NFR BLD AUTO: 8.2 % (ref 5–12)
NEUTROPHILS NFR BLD AUTO: 4.83 10*3/MM3 (ref 1.7–7)
NEUTROPHILS NFR BLD AUTO: 69.1 % (ref 42.7–76)
NITRITE UR QL STRIP: NEGATIVE
NRBC BLD AUTO-RTO: 0 /100 WBC (ref 0–0.2)
OPIATES UR QL: NEGATIVE
OXYCODONE UR QL SCN: NEGATIVE
PCP UR QL SCN: NEGATIVE
PH BLDV: 7.44 PH UNITS (ref 7.31–7.41)
PH UR STRIP.AUTO: 5.5 [PH] (ref 5–8)
PLATELET # BLD AUTO: 133 10*3/MM3 (ref 140–450)
PMV BLD AUTO: 11.1 FL (ref 6–12)
POTASSIUM SERPL-SCNC: 4.5 MMOL/L (ref 3.5–5.2)
PROT SERPL-MCNC: 7.8 G/DL (ref 6–8.5)
PROT UR QL STRIP: NEGATIVE
RBC # BLD AUTO: 5.61 10*6/MM3 (ref 4.14–5.8)
SODIUM SERPL-SCNC: 132 MMOL/L (ref 136–145)
SP GR UR STRIP: 1.02 (ref 1–1.03)
TRICYCLICS UR QL SCN: NEGATIVE
TROPONIN T % DELTA: -13
TROPONIN T NUMERIC DELTA: -4 NG/L
TROPONIN T SERPL HS-MCNC: 31 NG/L
UROBILINOGEN UR QL STRIP: ABNORMAL
WBC NRBC COR # BLD AUTO: 6.98 10*3/MM3 (ref 3.4–10.8)
WHOLE BLOOD HOLD COAG: NORMAL
WHOLE BLOOD HOLD SPECIMEN: NORMAL

## 2025-05-14 PROCEDURE — 82948 REAGENT STRIP/BLOOD GLUCOSE: CPT

## 2025-05-14 PROCEDURE — 80162 ASSAY OF DIGOXIN TOTAL: CPT | Performed by: EMERGENCY MEDICINE

## 2025-05-14 PROCEDURE — 84484 ASSAY OF TROPONIN QUANT: CPT | Performed by: EMERGENCY MEDICINE

## 2025-05-14 PROCEDURE — 70450 CT HEAD/BRAIN W/O DYE: CPT

## 2025-05-14 PROCEDURE — 82800 BLOOD PH: CPT | Performed by: EMERGENCY MEDICINE

## 2025-05-14 PROCEDURE — 80053 COMPREHEN METABOLIC PANEL: CPT | Performed by: EMERGENCY MEDICINE

## 2025-05-14 PROCEDURE — 63710000001 INSULIN LISPRO (HUMAN) PER 5 UNITS: Performed by: EMERGENCY MEDICINE

## 2025-05-14 PROCEDURE — 71045 X-RAY EXAM CHEST 1 VIEW: CPT

## 2025-05-14 PROCEDURE — 81003 URINALYSIS AUTO W/O SCOPE: CPT | Performed by: EMERGENCY MEDICINE

## 2025-05-14 PROCEDURE — 82077 ASSAY SPEC XCP UR&BREATH IA: CPT | Performed by: EMERGENCY MEDICINE

## 2025-05-14 PROCEDURE — 93010 ELECTROCARDIOGRAM REPORT: CPT | Performed by: INTERNAL MEDICINE

## 2025-05-14 PROCEDURE — 82009 KETONE BODYS QUAL: CPT | Performed by: EMERGENCY MEDICINE

## 2025-05-14 PROCEDURE — 85025 COMPLETE CBC W/AUTO DIFF WBC: CPT | Performed by: EMERGENCY MEDICINE

## 2025-05-14 PROCEDURE — 99284 EMERGENCY DEPT VISIT MOD MDM: CPT

## 2025-05-14 PROCEDURE — 93005 ELECTROCARDIOGRAM TRACING: CPT | Performed by: EMERGENCY MEDICINE

## 2025-05-14 PROCEDURE — 80307 DRUG TEST PRSMV CHEM ANLYZR: CPT | Performed by: EMERGENCY MEDICINE

## 2025-05-14 PROCEDURE — 36415 COLL VENOUS BLD VENIPUNCTURE: CPT

## 2025-05-14 PROCEDURE — 25810000003 SODIUM CHLORIDE 0.9 % SOLUTION: Performed by: EMERGENCY MEDICINE

## 2025-05-14 PROCEDURE — 83735 ASSAY OF MAGNESIUM: CPT | Performed by: EMERGENCY MEDICINE

## 2025-05-14 RX ORDER — INSULIN LISPRO 100 [IU]/ML
7 INJECTION, SOLUTION INTRAVENOUS; SUBCUTANEOUS ONCE
Status: COMPLETED | OUTPATIENT
Start: 2025-05-14 | End: 2025-05-14

## 2025-05-14 RX ORDER — DROXIDOPA 100 MG/1
100 CAPSULE ORAL ONCE
Status: COMPLETED | OUTPATIENT
Start: 2025-05-14 | End: 2025-05-14

## 2025-05-14 RX ORDER — SODIUM CHLORIDE 0.9 % (FLUSH) 0.9 %
10 SYRINGE (ML) INJECTION AS NEEDED
Status: DISCONTINUED | OUTPATIENT
Start: 2025-05-14 | End: 2025-05-15 | Stop reason: HOSPADM

## 2025-05-14 RX ADMIN — SODIUM CHLORIDE 1000 ML: 9 INJECTION, SOLUTION INTRAVENOUS at 21:27

## 2025-05-14 RX ADMIN — INSULIN LISPRO 7 UNITS: 100 INJECTION, SOLUTION INTRAVENOUS; SUBCUTANEOUS at 21:27

## 2025-05-14 RX ADMIN — DROXIDOPA 100 MG: 100 CAPSULE ORAL at 22:59

## 2025-05-15 VITALS
TEMPERATURE: 97.8 F | SYSTOLIC BLOOD PRESSURE: 111 MMHG | BODY MASS INDEX: 25.93 KG/M2 | HEIGHT: 76 IN | HEART RATE: 107 BPM | DIASTOLIC BLOOD PRESSURE: 89 MMHG | RESPIRATION RATE: 20 BRPM | OXYGEN SATURATION: 96 % | WEIGHT: 212.96 LBS

## 2025-05-15 LAB
GLUCOSE BLDC GLUCOMTR-MCNC: 352 MG/DL (ref 70–99)
GLUCOSE BLDC GLUCOMTR-MCNC: 409 MG/DL (ref 70–99)
GLUCOSE BLDC GLUCOMTR-MCNC: 514 MG/DL (ref 70–99)
QT INTERVAL: 360 MS
QTC INTERVAL: 452 MS

## 2025-05-15 PROCEDURE — 82948 REAGENT STRIP/BLOOD GLUCOSE: CPT | Performed by: EMERGENCY MEDICINE

## 2025-05-15 PROCEDURE — 82948 REAGENT STRIP/BLOOD GLUCOSE: CPT

## 2025-05-15 NOTE — DISCHARGE INSTRUCTIONS
Please restart and take your insulin as previously prescribed by your endocrinologist    Once you start your insulin back, please check your blood glucose before meals, before bed and discussed those readings with your endocrinologist at your follow-up appointment    Please take the droxidopa 2 g 3 times a day as prescribed by Dr. Andersen    Please return to the emergency room for passing out, chest pain, chest pressure, shortness of breath, headache, vomiting, fever, altered mental status or any new symptoms you are concerned with

## 2025-05-15 NOTE — ED PROVIDER NOTES
Time: 2:50 AM EDT  Date of encounter:  5/14/2025  Independent Historian/Clinical History and Information was obtained by:   Patient  Chief Complaint: Multiple falls and lightheadedness    History is limited by: N/A    History of Present Illness:  Patient is a 60 y.o. year old male who presents to the emergency department for evaluation of multiple falls and lightheadedness.  Patient notes over the last several days he gets lightheaded upon standing and falls.  Patient does note that he has orthostatic hypotension.  The patient also notes that he has diabetes.  Patient notes that he has not been taking his diabetic medication for last several days.  Patient denies any chest pain or chest pressure.  Patient denies any headache or vertigo.  Patient denies any shortness of breath.  The patient had no vomiting or diarrhea.  Denies any hematochezia or melena.    HPI    Patient Care Team  Primary Care Provider: Anish Alanis MD    Past Medical History:     Allergies   Allergen Reactions    Penicillins Itching     Past Medical History:   Diagnosis Date    Atrial fibrillation     Coronary artery disease     Diabetes mellitus     Disease of thyroid gland     Hypotension      History reviewed. No pertinent surgical history.  History reviewed. No pertinent family history.    Home Medications:  Prior to Admission medications    Medication Sig Start Date End Date Taking? Authorizing Provider   albuterol sulfate  (90 Base) MCG/ACT inhaler Inhale 2 puffs Every 4 (Four) Hours As Needed for Wheezing or Shortness of Air. 2/8/24   Keli Hernandez MD   aspirin 81 MG EC tablet Take 1 tablet by mouth Daily.    Keli Hernandez MD Breztri Aerosphere 160-9-4.8 MCG/ACT aerosol inhaler  8/30/24   Keli Hernandez MD   budesonide (PULMICORT) 0.5 MG/2ML nebulizer solution Take 2 mL by nebulization Daily. 2/22/24   Keli Hernandez MD   cefdinir (OMNICEF) 300 MG capsule Take 1 capsule by mouth 2 (Two)  "Times a Day. 8/27/24   Keli Hernandez MD   Cholecalciferol 25 MCG (1000 UT) tablet Take 2 tablets by mouth Daily.    Keli Hernandez MD   digoxin (LANOXIN) 125 MCG tablet Take 1 tablet by mouth Daily. 11/12/24   Nayeli Rivera APRN   Droxidopa (Northera) 100 MG capsule Take 1 capsule by mouth 3 (Three) Times a Day. 1/13/25   TERA Andersen MD   escitalopram (LEXAPRO) 20 MG tablet Take 1 tablet by mouth Daily.    Keli Hernandez MD   furosemide (LASIX) 40 MG tablet Take 1 tablet by mouth As Needed (swelling). 9/25/24   TERA Andersen MD   gabapentin (NEURONTIN) 600 MG tablet Take 1 tablet by mouth 2 (Two) Times a Day. 4/25/24   Keli Hernandez MD   Insulin Disposable Pump (V-Go 40) 40 UNIT/24HR kit Use. Type of Insulin: Fiasp 100u/ml  Type of Pump:Other: V-GO 40 DISPOSABLE  Bolus amount: up to 36 units in 2-unit increments  ( 1 click=2 units)  Basal amount : 40 units/24hr (1.67U/hr) basal rate and up to 36 units of on-demand bolus in 2-unit increments  Correction factor: ?  Insulin to carbohydrate ratio: ?    Date of last site change: ?  Location of catheter:Right upper arm    Frequency of refill: every 24 hours per patient  Last refill date: 05/21/2024 PM changes every PM per patient    Prescriber :  Leslie Delgado  Phone number [phone]    Keli Hernandez MD   Insulin Lispro (HumaLOG) 100 UNIT/ML injection Inject 4 Units under the skin into the appropriate area as directed 3 (Three) Times a Day Before Meals for 25 days. Do not use humalog insulin if you are using your insulin pump 5/29/24 4/28/25  Cody Lopez MD   Insulin Syringe 31G X 5/16\" 1 ML misc Use 1 syringe 3 times a day. 5/29/24   Cody Lopez MD   Jardiance 10 MG tablet tablet Take 1 tablet by mouth Daily. 6/6/24   Keli Hernandez MD   Lantus SoloStar 100 UNIT/ML injection pen  8/7/24   Keli Hernandez MD   levothyroxine (SYNTHROID, LEVOTHROID) 50 MCG tablet Take 1 tablet by mouth " 6 (Six) Times a Week. 50 mcg daily except on Sunday take 100 mcg    Keli Hernandez MD   levothyroxine (SYNTHROID, LEVOTHROID) 50 MCG tablet Take 2 tablets by mouth Every 7 (Seven) Days. Take 50 mcg daily except on Sunday take 100 mcg    Keli Hernandez MD   loratadine (CLARITIN) 10 MG tablet Take 1 tablet by mouth Daily. 2/8/24   Keli Hernandez MD   metoprolol succinate XL (TOPROL-XL) 25 MG 24 hr tablet Take 1 tablet by mouth Daily. 12/10/24   Nayeli Rivera APRN   Omega-3 Fatty Acids (fish oil) 1200 MG capsule capsule Take 1 capsule by mouth 2 (Two) Times a Day.    eKli Hernandez MD   Ozempic, 0.25 or 0.5 MG/DOSE, 2 MG/3ML solution pen-injector  7/3/24   Keli Hernandez MD   rosuvastatin (CRESTOR) 20 MG tablet Take 1 tablet by mouth every night at bedtime.    Keli Hernandez MD   spironolactone (ALDACTONE) 25 MG tablet Take 1 tablet by mouth Daily.    Keli Hernandez MD   Xarelto 20 MG tablet Take 1 tablet by mouth Daily.    Keli Hernandez MD        Social History:   Social History     Tobacco Use    Smoking status: Every Day     Current packs/day: 1.00     Average packs/day: 1 pack/day for 15.0 years (15.0 ttl pk-yrs)     Types: Cigarettes    Smokeless tobacco: Never   Vaping Use    Vaping status: Never Used   Substance Use Topics    Alcohol use: Not Currently    Drug use: Not Currently         Review of Systems:  Review of Systems   Constitutional:  Positive for fatigue. Negative for chills, diaphoresis and fever.   HENT:  Negative for congestion, postnasal drip, rhinorrhea and sore throat.    Eyes:  Negative for photophobia.   Respiratory:  Negative for cough, chest tightness and shortness of breath.    Cardiovascular:  Negative for chest pain, palpitations and leg swelling.   Gastrointestinal:  Negative for abdominal pain, diarrhea, nausea and vomiting.   Endocrine: Positive for polydipsia and polyuria.   Genitourinary:  Negative for difficulty  "urinating, dysuria, flank pain, frequency, hematuria and urgency.   Musculoskeletal:  Negative for neck pain and neck stiffness.   Skin:  Negative for pallor and rash.   Neurological:  Positive for dizziness and light-headedness. Negative for syncope, weakness, numbness and headaches.   Hematological:  Negative for adenopathy. Does not bruise/bleed easily.   Psychiatric/Behavioral: Negative.          Physical Exam:  /89   Pulse 107   Temp 97.8 °F (36.6 °C)   Resp 20   Ht 193 cm (76\")   Wt 96.6 kg (212 lb 15.4 oz)   SpO2 96%   BMI 25.92 kg/m²     Physical Exam  Vitals and nursing note reviewed.   Constitutional:       General: He is not in acute distress.     Appearance: Normal appearance. He is not ill-appearing, toxic-appearing or diaphoretic.   HENT:      Head: Normocephalic and atraumatic.      Mouth/Throat:      Mouth: Mucous membranes are moist.   Eyes:      Extraocular Movements:      Right eye: Abnormal extraocular motion present. No nystagmus.      Left eye: No nystagmus.      Pupils: Pupils are equal, round, and reactive to light.      Comments: The patient has exotropia of the right eye       Cardiovascular:      Rate and Rhythm: Normal rate. Rhythm irregular.      Pulses: Normal pulses.           Carotid pulses are 2+ on the right side and 2+ on the left side.       Radial pulses are 2+ on the right side and 2+ on the left side.        Femoral pulses are 2+ on the right side and 2+ on the left side.       Popliteal pulses are 2+ on the right side and 2+ on the left side.        Dorsalis pedis pulses are 2+ on the right side and 2+ on the left side.        Posterior tibial pulses are 2+ on the right side and 2+ on the left side.      Heart sounds: Normal heart sounds. No murmur heard.  Pulmonary:      Effort: Pulmonary effort is normal. No accessory muscle usage, respiratory distress or retractions.      Breath sounds: Normal breath sounds. No wheezing, rhonchi or rales.   Abdominal:      " General: Abdomen is flat. There is no distension.      Palpations: Abdomen is soft. There is no mass or pulsatile mass.      Tenderness: There is no abdominal tenderness. There is no right CVA tenderness, left CVA tenderness, guarding or rebound.      Comments: No rigidity   Musculoskeletal:         General: No swelling, tenderness or deformity.      Cervical back: Neck supple. No tenderness.      Right lower leg: No edema.      Left lower leg: No edema.   Skin:     General: Skin is warm and dry.      Capillary Refill: Capillary refill takes less than 2 seconds.      Coloration: Skin is not jaundiced or pale.      Findings: No erythema.   Neurological:      General: No focal deficit present.      Mental Status: He is alert and oriented to person, place, and time. Mental status is at baseline.      Cranial Nerves: Cranial nerves 2-12 are intact. No cranial nerve deficit.      Sensory: Sensation is intact. No sensory deficit.      Motor: Motor function is intact. No weakness or pronator drift.      Coordination: Coordination is intact. Coordination normal.   Psychiatric:         Mood and Affect: Mood normal.         Behavior: Behavior normal.                  Procedures:  Procedures      Medical Decision Making:      Comorbidities that affect care:    Diabetes, hypothyroid, coronary disease, atrial fibrillation, orthostatic hypotension, hyperlipidemia    External Notes reviewed:    None      The following orders were placed and all results were independently analyzed by me:  Orders Placed This Encounter   Procedures    XR Chest 1 View    CT Head Without Contrast    [place] Draw    Comprehensive Metabolic Panel    High Sensitivity Troponin T    Magnesium    Urinalysis With Microscopic If Indicated (No Culture) - Urine, Clean Catch    CBC Auto Differential    Urine Drug Screen - Urine, Clean Catch    Ethanol    Digoxin Level    High Sensitivity Troponin T 1Hr    pH, Venous    Acetone    Fentanyl, Urine - Urine, Clean  Catch    Undress & Gown    Continuous Pulse Oximetry    Vital Signs    Orthostatic Blood Pressure    Orthostatic Vitals (Blood Pressure & Heart Rate)    POC Glucose Once    POC Glucose Q1H    POC Glucose Once    POC Glucose Once    POC Glucose Once    ECG 12 Lead Drug Monitoring    CBC & Differential    Green Top (Gel)    Lavender Top    Gold Top - SST    Light Blue Top       Medications Given in the Emergency Department:  Medications   sodium chloride 0.9 % bolus 1,000 mL (0 mL Intravenous Stopped 5/14/25 2217)   Insulin Lispro (humaLOG) injection 7 Units (7 Units Subcutaneous Given 5/14/25 2127)   droxidopa (NORTHERA) capsule 100 mg (100 mg Oral Given 5/14/25 2259)        ED Course:    ED Course as of 05/18/25 0351   Wed May 14, 2025   1908 EKG:    Rhythm: Atrial fibrillation  Rate: 95  Intervals: Normal QT interval  Incomplete left bundle branch block  T-wave: Nonspecific T wave flattening  ST Segment: No pathological ST elevation or reciprocal ST depression to suggest STEMI    EKG Comparison: No change in QRS and ST morphology from EKG performed November 12, 2024    Interpreted by me  [SD]      ED Course User Index  [SD] Enrrique Gil DO       Labs:    Lab Results (last 24 hours)       ** No results found for the last 24 hours. **             Imaging:    No Radiology Exams Resulted Within Past 24 Hours        Differential Diagnosis and Discussion:    Dizziness: Based on the patient's history, signs, and symptoms, the diffential diagnosis includes but is not limited to meningitis, stroke, sepsis, subarachnoid hemorrhage, intracranial bleeding, encephalitis, vertigo, electrolyte imbalance, and metabolic disorders.    Labs were collected in the emergency department and all labs were reviewed and interpreted by me.  X-ray were performed in the emergency department and all X-ray impressions were independently interpreted by me.  An EKG was performed and the EKG was interpreted by me.    MDM  Number of Diagnoses or  Management Options  Atrial fibrillation, unspecified type  Orthostatic hypotension  Uncontrolled type 2 diabetes mellitus with hyperglycemia  Diagnosis management comments: The patient's CBC was reviewed and shows no abnormalities of critical concern.  Of note, there is no anemia requiring a blood transfusion and the platelet count is acceptable    Patient's CMP demonstrates a glucose of 608.  The patient's BUN is elevated at 28.  The patient's sodium potassium acceptable.  Patient has mild elevation in his liver enzymes.  The patient has a normal anion gap.    EKG demonstrates atrial fibrillation with a rate of 95.  The patient does have a history of A-fib.  It is rate controlled.  There is no evidence of STEMI    An alcohol level was ordered.  The alcohol level returned as undetectable    Patient's digoxin level was therapeutic.    The patient's magnesium level is unremarkable and thus I do not feel is contributory to the patient's symptoms    A toxicology screen was performed today.  There were no abnormal substances found or that could not be explained by the patient's medications.  The patient and/or caregiver does understand that there can be false negatives as well as false positives to the screening tests        The patient's urinalysis was negative for obvious infection or blood    Patient's initial troponin was 31.  He was repeated an hour later and returned at 27.  This is a negative delta for which typically indicates there is not an acute cardiac event.    Patient had negative serum ketones    The patient has a normal venous pH and no serum ketones are present.  This makes DKA unlikely    A CT scan of the brain was performed while in the emergency room.  The CT scan demonstrated no evidence for acute abnormalities including acute infarct, hemorrhage, mass or edema    The patient was initially very orthostatic.  Which according to the past medical history the patient does have a history of orthostatic  hypotension.  Before treatment the patient's blood pressure was 123/81 laying down with a pulse of 98.  Upon standing his blood pressure dropped to 80/69 his pulse went to 103.  This is consistent with the patient's history of orthostatic hypotension.  I have discussed the patient's medication with him.  The patient has only been taking the droxidopa twice a day.  It is written for 3 times a day.  I have discussed this with the patient in detail.  The patient was given a liter of fluid and given 100 mg of droxidopa here in the emergency room.  Patient was reassessed the patient's orthostats have significant improved.  The patient's blood pressure laying was 116/78 with a pulse of 96 the patient's blood pressure only dropped to 97/84.  The patient feels much better.  I had a very long conversation with the patient in regards to his medical compliance.  The patient will take his medication as directed.  The patient will follow-up with both his endocrinologist and his cardiologist.    I have spoken with this patient.  I have explained the patient's condition, diagnosis and treatment plan based on the information available to me at this time.  I have answered the patient's questions and addressed any concerns.  The patient has a good understanding of the patient's diagnosis condition and treatment plan as can be expected at this point.  The vital signs have been stable.  The patient's condition is stable and appropriate for discharge from the emergency department.     Patient will pursue further outpatient evaluation with the primary care physician or other designated or consulting physicians as outlined in the discharge instruction.  The patient is agreeable to this plan of care and follow-up instructions have been explained in detail.  The patient has received these instructions in written format and has expressed understanding of the discharge instructions.  The patient is aware that any significant change in  condition or worsening of symptoms should prompt immediate return to this or the closest emergency department or call 911       Amount and/or Complexity of Data Reviewed  Clinical lab tests: reviewed  Tests in the radiology section of CPT®: reviewed  Tests in the medicine section of CPT®: reviewed           Social Determinants of Health:    Patient is independent, reliable, and has access to care.       Disposition and Care Coordination:    Discharged: I considered escalation of care by admitting this patient to the hospital, however the patient's symptoms improved with treatment emergency department    I have explained discharge medications and the need for follow up with the patient/caretakers. This was also printed in the discharge instructions. Patient was discharged with the following medications and follow up:      Medication List      No changes were made to your prescriptions during this visit.      Anish Alanis MD  [address] [place] [id number] [phone]    Call on 5/16/2025         Final diagnoses:   Orthostatic hypotension   Uncontrolled type 2 diabetes mellitus with hyperglycemia   Atrial fibrillation, unspecified type        ED Disposition       ED Disposition   Discharge    Condition   Stable    Comment   --               This medical record created using voice recognition software.             Enrrique Gil DO  05/18/25 0351

## 2025-05-23 RX ORDER — DROXIDOPA 100 MG/1
100 CAPSULE ORAL 3 TIMES DAILY
Qty: 270 CAPSULE | Refills: 1 | Status: SHIPPED | OUTPATIENT
Start: 2025-05-23

## 2025-06-10 ENCOUNTER — TELEPHONE (OUTPATIENT)
Dept: CARDIOLOGY | Facility: CLINIC | Age: 60
End: 2025-06-10
Payer: MEDICARE

## 2025-06-10 NOTE — TELEPHONE ENCOUNTER
Patient's PCP office called and wants patient to either have a follow-up scheduled or increase the patient's Droxidopa.   Dr. Alanis's office would like a call back once decided what to do.

## 2025-06-12 NOTE — TELEPHONE ENCOUNTER
Can you please set up a follow-up appt with Belkis and let me know when it is set so I can call the PCP office please?

## 2025-08-06 ENCOUNTER — TELEPHONE (OUTPATIENT)
Dept: CARDIOLOGY | Facility: CLINIC | Age: 60
End: 2025-08-06
Payer: MEDICARE

## 2025-08-12 ENCOUNTER — TELEPHONE (OUTPATIENT)
Dept: ADMINISTRATIVE | Facility: OTHER | Age: 60
End: 2025-08-12
Payer: MEDICARE

## 2025-08-14 ENCOUNTER — OFFICE VISIT (OUTPATIENT)
Dept: DIABETES SERVICES | Facility: HOSPITAL | Age: 60
End: 2025-08-14
Payer: MEDICARE

## 2025-08-14 VITALS
WEIGHT: 200 LBS | OXYGEN SATURATION: 97 % | HEART RATE: 85 BPM | DIASTOLIC BLOOD PRESSURE: 78 MMHG | SYSTOLIC BLOOD PRESSURE: 134 MMHG | BODY MASS INDEX: 24.36 KG/M2 | HEIGHT: 76 IN

## 2025-08-14 DIAGNOSIS — E11.65 UNCONTROLLED TYPE 2 DIABETES MELLITUS WITH HYPERGLYCEMIA: Primary | ICD-10-CM

## 2025-08-14 DIAGNOSIS — Z79.4 TYPE 2 DIABETES MELLITUS WITH STAGE 2 CHRONIC KIDNEY DISEASE, WITH LONG-TERM CURRENT USE OF INSULIN: ICD-10-CM

## 2025-08-14 DIAGNOSIS — N18.2 TYPE 2 DIABETES MELLITUS WITH STAGE 2 CHRONIC KIDNEY DISEASE, WITH LONG-TERM CURRENT USE OF INSULIN: ICD-10-CM

## 2025-08-14 DIAGNOSIS — E11.40 TYPE 2 DIABETES MELLITUS WITH DIABETIC NEUROPATHY, WITH LONG-TERM CURRENT USE OF INSULIN: ICD-10-CM

## 2025-08-14 DIAGNOSIS — Z79.4 TYPE 2 DIABETES MELLITUS WITH DIABETIC NEUROPATHY, WITH LONG-TERM CURRENT USE OF INSULIN: ICD-10-CM

## 2025-08-14 DIAGNOSIS — E11.22 TYPE 2 DIABETES MELLITUS WITH STAGE 2 CHRONIC KIDNEY DISEASE, WITH LONG-TERM CURRENT USE OF INSULIN: ICD-10-CM

## 2025-08-14 PROBLEM — E03.9 HYPOTHYROIDISM: Status: ACTIVE | Noted: 2019-08-08

## 2025-08-14 PROBLEM — F41.9 ANXIETY DISORDER, UNSPECIFIED: Status: ACTIVE | Noted: 2024-02-29

## 2025-08-14 PROBLEM — I11.0 HYPERTENSIVE HEART DISEASE WITH HEART FAILURE: Status: ACTIVE | Noted: 2024-02-29

## 2025-08-14 PROBLEM — I48.0 PAF (PAROXYSMAL ATRIAL FIBRILLATION): Status: ACTIVE | Noted: 2017-07-18

## 2025-08-14 PROBLEM — F32.A DEPRESSION, UNSPECIFIED: Status: ACTIVE | Noted: 2024-02-29

## 2025-08-14 PROBLEM — Z55.6 PROBLEMS RELATED TO HEALTH LITERACY: Status: ACTIVE | Noted: 2024-02-29

## 2025-08-14 PROBLEM — E53.8 COBALAMIN DEFICIENCY: Status: ACTIVE | Noted: 2017-04-14

## 2025-08-14 PROBLEM — E87.1 HYPO-OSMOLALITY AND HYPONATREMIA: Status: ACTIVE | Noted: 2024-02-29

## 2025-08-14 PROBLEM — Z91.81 HISTORY OF FALLING: Status: ACTIVE | Noted: 2024-02-29

## 2025-08-14 PROBLEM — E83.42 HYPOMAGNESEMIA: Status: ACTIVE | Noted: 2024-02-29

## 2025-08-14 PROBLEM — E87.6 HYPOKALEMIA: Status: ACTIVE | Noted: 2024-02-29

## 2025-08-14 PROBLEM — R94.31 ABNORMAL EKG: Status: ACTIVE | Noted: 2021-06-15

## 2025-08-14 PROBLEM — J96.10 CHRONIC RESPIRATORY FAILURE: Status: ACTIVE | Noted: 2025-06-10

## 2025-08-14 PROBLEM — R09.02 HYPOXIA: Status: ACTIVE | Noted: 2024-03-14

## 2025-08-14 PROBLEM — E11.8 DIABETES MELLITUS TYPE 2 WITH COMPLICATIONS: Status: ACTIVE | Noted: 2019-10-15

## 2025-08-14 PROBLEM — E53.0: Status: ACTIVE | Noted: 2017-05-02

## 2025-08-14 PROBLEM — I42.0 DILATED CARDIOMYOPATHY: Status: ACTIVE | Noted: 2017-07-18

## 2025-08-14 PROBLEM — R79.89 HIGH PLASMA HOMOCYSTINE: Status: ACTIVE | Noted: 2019-10-15

## 2025-08-14 PROBLEM — E07.81 SICK-EUTHYROID SYNDROME: Status: ACTIVE | Noted: 2018-08-08

## 2025-08-14 PROBLEM — I42.9 CARDIOMYOPATHY, UNSPECIFIED: Status: ACTIVE | Noted: 2024-02-29

## 2025-08-14 PROBLEM — E87.1 HYPONATREMIA: Status: ACTIVE | Noted: 2018-11-08

## 2025-08-14 LAB
EXPIRATION DATE: ABNORMAL
GLUCOSE BLDC GLUCOMTR-MCNC: 294 MG/DL (ref 70–99)
HBA1C MFR BLD: 15 % (ref 4.5–5.7)
Lab: ABNORMAL

## 2025-08-14 PROCEDURE — G2211 COMPLEX E/M VISIT ADD ON: HCPCS | Performed by: NURSE PRACTITIONER

## 2025-08-14 PROCEDURE — 3078F DIAST BP <80 MM HG: CPT | Performed by: NURSE PRACTITIONER

## 2025-08-14 PROCEDURE — 3075F SYST BP GE 130 - 139MM HG: CPT | Performed by: NURSE PRACTITIONER

## 2025-08-14 PROCEDURE — 82948 REAGENT STRIP/BLOOD GLUCOSE: CPT | Performed by: NURSE PRACTITIONER

## 2025-08-14 PROCEDURE — 3046F HEMOGLOBIN A1C LEVEL >9.0%: CPT | Performed by: NURSE PRACTITIONER

## 2025-08-14 PROCEDURE — 99214 OFFICE O/P EST MOD 30 MIN: CPT | Performed by: NURSE PRACTITIONER

## 2025-08-14 PROCEDURE — G0463 HOSPITAL OUTPT CLINIC VISIT: HCPCS | Performed by: NURSE PRACTITIONER

## 2025-08-14 PROCEDURE — 1159F MED LIST DOCD IN RCRD: CPT | Performed by: NURSE PRACTITIONER

## 2025-08-14 PROCEDURE — 83036 HEMOGLOBIN GLYCOSYLATED A1C: CPT | Performed by: NURSE PRACTITIONER

## 2025-08-14 PROCEDURE — 1160F RVW MEDS BY RX/DR IN RCRD: CPT | Performed by: NURSE PRACTITIONER

## 2025-08-14 RX ORDER — INSULIN LISPRO 100 [IU]/ML
INJECTION, SOLUTION INTRAVENOUS; SUBCUTANEOUS
Qty: 12 ML | Refills: 0 | Status: SHIPPED | OUTPATIENT
Start: 2025-08-14

## 2025-08-14 RX ORDER — INSULIN PMP CART,AUT,G6/7,CNTR
1 EACH SUBCUTANEOUS ONCE
Qty: 1 KIT | Refills: 0 | Status: SHIPPED | OUTPATIENT
Start: 2025-08-14 | End: 2025-08-14

## 2025-08-14 RX ORDER — BLOOD-GLUCOSE SENSOR
1 EACH MISCELLANEOUS
Qty: 6 EACH | Refills: 1 | Status: SHIPPED | OUTPATIENT
Start: 2025-08-14 | End: 2025-11-12

## 2025-08-19 ENCOUNTER — TELEPHONE (OUTPATIENT)
Dept: CARDIOLOGY | Facility: CLINIC | Age: 60
End: 2025-08-19
Payer: MEDICARE

## 2025-08-19 DIAGNOSIS — E11.65 UNCONTROLLED TYPE 2 DIABETES MELLITUS WITH HYPERGLYCEMIA: Primary | ICD-10-CM

## 2025-08-19 DIAGNOSIS — E11.65 UNCONTROLLED TYPE 2 DIABETES MELLITUS WITH HYPERGLYCEMIA: ICD-10-CM

## 2025-08-19 RX ORDER — INSULIN PMP CART,AUT,G6/7,CNTR
1 EACH SUBCUTANEOUS ONCE
Qty: 1 KIT | Refills: 0 | Status: SHIPPED | OUTPATIENT
Start: 2025-08-19 | End: 2025-08-19

## 2025-08-19 RX ORDER — INSULIN LISPRO 100 [IU]/ML
INJECTION, SOLUTION INTRAVENOUS; SUBCUTANEOUS
Qty: 140 ML | Refills: 1 | Status: SHIPPED | OUTPATIENT
Start: 2025-08-19